# Patient Record
Sex: MALE | Race: WHITE | NOT HISPANIC OR LATINO | Employment: UNEMPLOYED | URBAN - METROPOLITAN AREA
[De-identification: names, ages, dates, MRNs, and addresses within clinical notes are randomized per-mention and may not be internally consistent; named-entity substitution may affect disease eponyms.]

---

## 2017-08-01 RX ORDER — LISINOPRIL AND HYDROCHLOROTHIAZIDE 20; 12.5 MG/1; MG/1
TABLET ORAL
Qty: 30 TAB | OUTPATIENT
Start: 2017-08-01

## 2019-03-06 ENCOUNTER — OFFICE VISIT (OUTPATIENT)
Dept: MEDICAL GROUP | Facility: MEDICAL CENTER | Age: 49
End: 2019-03-06
Payer: COMMERCIAL

## 2019-03-06 ENCOUNTER — HOSPITAL ENCOUNTER (OUTPATIENT)
Dept: LAB | Facility: MEDICAL CENTER | Age: 49
End: 2019-03-06
Attending: PHYSICIAN ASSISTANT
Payer: COMMERCIAL

## 2019-03-06 VITALS
HEART RATE: 76 BPM | BODY MASS INDEX: 34.16 KG/M2 | SYSTOLIC BLOOD PRESSURE: 122 MMHG | DIASTOLIC BLOOD PRESSURE: 62 MMHG | WEIGHT: 230.6 LBS | HEIGHT: 69 IN | OXYGEN SATURATION: 96 % | TEMPERATURE: 97.6 F

## 2019-03-06 DIAGNOSIS — Z23 NEED FOR VACCINATION: ICD-10-CM

## 2019-03-06 DIAGNOSIS — I63.40 CEREBROVASCULAR ACCIDENT (CVA) DUE TO EMBOLISM OF CEREBRAL ARTERY (HCC): ICD-10-CM

## 2019-03-06 DIAGNOSIS — E78.5 DYSLIPIDEMIA: ICD-10-CM

## 2019-03-06 DIAGNOSIS — M54.42 CHRONIC BILATERAL LOW BACK PAIN WITH BILATERAL SCIATICA: ICD-10-CM

## 2019-03-06 DIAGNOSIS — F17.200 TOBACCO DEPENDENCE: ICD-10-CM

## 2019-03-06 DIAGNOSIS — J42 CHRONIC BRONCHITIS, UNSPECIFIED CHRONIC BRONCHITIS TYPE (HCC): ICD-10-CM

## 2019-03-06 DIAGNOSIS — G89.29 CHRONIC BILATERAL LOW BACK PAIN WITH BILATERAL SCIATICA: ICD-10-CM

## 2019-03-06 DIAGNOSIS — R73.03 PREDIABETES: ICD-10-CM

## 2019-03-06 DIAGNOSIS — J30.2 SEASONAL ALLERGIES: ICD-10-CM

## 2019-03-06 DIAGNOSIS — F33.9 EPISODE OF RECURRENT MAJOR DEPRESSIVE DISORDER, UNSPECIFIED DEPRESSION EPISODE SEVERITY (HCC): ICD-10-CM

## 2019-03-06 DIAGNOSIS — M54.41 CHRONIC BILATERAL LOW BACK PAIN WITH BILATERAL SCIATICA: ICD-10-CM

## 2019-03-06 DIAGNOSIS — I10 ESSENTIAL HYPERTENSION: ICD-10-CM

## 2019-03-06 DIAGNOSIS — G47.33 OBSTRUCTIVE SLEEP APNEA SYNDROME: ICD-10-CM

## 2019-03-06 DIAGNOSIS — F41.9 ANXIETY: ICD-10-CM

## 2019-03-06 PROBLEM — J44.9 COPD (CHRONIC OBSTRUCTIVE PULMONARY DISEASE) (HCC): Status: ACTIVE | Noted: 2019-03-06

## 2019-03-06 LAB
ALBUMIN SERPL BCP-MCNC: 4.6 G/DL (ref 3.2–4.9)
ALBUMIN/GLOB SERPL: 1.6 G/DL
ALP SERPL-CCNC: 52 U/L (ref 30–99)
ALT SERPL-CCNC: 101 U/L (ref 2–50)
ANION GAP SERPL CALC-SCNC: 7 MMOL/L (ref 0–11.9)
AST SERPL-CCNC: 69 U/L (ref 12–45)
BASOPHILS # BLD AUTO: 0.6 % (ref 0–1.8)
BASOPHILS # BLD: 0.03 K/UL (ref 0–0.12)
BILIRUB SERPL-MCNC: 0.7 MG/DL (ref 0.1–1.5)
BUN SERPL-MCNC: 12 MG/DL (ref 8–22)
CALCIUM SERPL-MCNC: 10.4 MG/DL (ref 8.5–10.5)
CHLORIDE SERPL-SCNC: 101 MMOL/L (ref 96–112)
CHOLEST SERPL-MCNC: 195 MG/DL (ref 100–199)
CO2 SERPL-SCNC: 29 MMOL/L (ref 20–33)
CREAT SERPL-MCNC: 0.97 MG/DL (ref 0.5–1.4)
EOSINOPHIL # BLD AUTO: 0.07 K/UL (ref 0–0.51)
EOSINOPHIL NFR BLD: 1.3 % (ref 0–6.9)
ERYTHROCYTE [DISTWIDTH] IN BLOOD BY AUTOMATED COUNT: 51 FL (ref 35.9–50)
GLOBULIN SER CALC-MCNC: 2.9 G/DL (ref 1.9–3.5)
GLUCOSE SERPL-MCNC: 115 MG/DL (ref 65–99)
HCT VFR BLD AUTO: 42.1 % (ref 42–52)
HDLC SERPL-MCNC: 28 MG/DL
HGB BLD-MCNC: 12.7 G/DL (ref 14–18)
IMM GRANULOCYTES # BLD AUTO: 0.03 K/UL (ref 0–0.11)
IMM GRANULOCYTES NFR BLD AUTO: 0.6 % (ref 0–0.9)
LDLC SERPL CALC-MCNC: 145 MG/DL
LYMPHOCYTES # BLD AUTO: 1.19 K/UL (ref 1–4.8)
LYMPHOCYTES NFR BLD: 22.4 % (ref 22–41)
MCH RBC QN AUTO: 23.5 PG (ref 27–33)
MCHC RBC AUTO-ENTMCNC: 30.2 G/DL (ref 33.7–35.3)
MCV RBC AUTO: 77.8 FL (ref 81.4–97.8)
MONOCYTES # BLD AUTO: 0.51 K/UL (ref 0–0.85)
MONOCYTES NFR BLD AUTO: 9.6 % (ref 0–13.4)
NEUTROPHILS # BLD AUTO: 3.48 K/UL (ref 1.82–7.42)
NEUTROPHILS NFR BLD: 65.5 % (ref 44–72)
NRBC # BLD AUTO: 0 K/UL
NRBC BLD-RTO: 0 /100 WBC
PLATELET # BLD AUTO: 249 K/UL (ref 164–446)
PMV BLD AUTO: 10.7 FL (ref 9–12.9)
POTASSIUM SERPL-SCNC: 4.1 MMOL/L (ref 3.6–5.5)
PROT SERPL-MCNC: 7.5 G/DL (ref 6–8.2)
RBC # BLD AUTO: 5.41 M/UL (ref 4.7–6.1)
SODIUM SERPL-SCNC: 137 MMOL/L (ref 135–145)
TRIGL SERPL-MCNC: 112 MG/DL (ref 0–149)
WBC # BLD AUTO: 5.3 K/UL (ref 4.8–10.8)

## 2019-03-06 PROCEDURE — 85025 COMPLETE CBC W/AUTO DIFF WBC: CPT

## 2019-03-06 PROCEDURE — 90471 IMMUNIZATION ADMIN: CPT | Performed by: INTERNAL MEDICINE

## 2019-03-06 PROCEDURE — 90472 IMMUNIZATION ADMIN EACH ADD: CPT | Performed by: INTERNAL MEDICINE

## 2019-03-06 PROCEDURE — 90715 TDAP VACCINE 7 YRS/> IM: CPT | Performed by: INTERNAL MEDICINE

## 2019-03-06 PROCEDURE — 90686 IIV4 VACC NO PRSV 0.5 ML IM: CPT | Performed by: INTERNAL MEDICINE

## 2019-03-06 PROCEDURE — 36415 COLL VENOUS BLD VENIPUNCTURE: CPT

## 2019-03-06 PROCEDURE — 80061 LIPID PANEL: CPT

## 2019-03-06 PROCEDURE — 99204 OFFICE O/P NEW MOD 45 MIN: CPT | Mod: 25 | Performed by: INTERNAL MEDICINE

## 2019-03-06 PROCEDURE — 80053 COMPREHEN METABOLIC PANEL: CPT

## 2019-03-06 RX ORDER — NICOTINE 21 MG/24HR
1 PATCH, TRANSDERMAL 24 HOURS TRANSDERMAL EVERY 24 HOURS
Qty: 30 PATCH | Refills: 3 | Status: SHIPPED | OUTPATIENT
Start: 2019-03-06 | End: 2019-09-04

## 2019-03-06 RX ORDER — FENOFIBRATE 145 MG/1
145 TABLET, COATED ORAL EVERY EVENING
Refills: 5 | COMMUNITY
Start: 2019-02-22

## 2019-03-06 RX ORDER — CLONAZEPAM 0.5 MG/1
TABLET ORAL
Refills: 1 | COMMUNITY
Start: 2019-01-28 | End: 2019-04-29 | Stop reason: SDUPTHER

## 2019-03-06 RX ORDER — ATORVASTATIN CALCIUM 80 MG/1
80 TABLET, FILM COATED ORAL EVERY EVENING
Refills: 11 | COMMUNITY
Start: 2019-01-30

## 2019-03-06 RX ORDER — FLUOXETINE HYDROCHLORIDE 60 MG/1
1 TABLET, FILM COATED ORAL; ORAL
Refills: 0 | COMMUNITY
Start: 2018-12-22 | End: 2019-03-06

## 2019-03-06 RX ORDER — PRAZOSIN HYDROCHLORIDE 2 MG/1
2 CAPSULE ORAL DAILY
Refills: 1 | COMMUNITY
Start: 2019-02-10 | End: 2019-04-29 | Stop reason: SDUPTHER

## 2019-03-06 RX ORDER — BUSPIRONE HYDROCHLORIDE 30 MG/1
TABLET ORAL
Refills: 1 | COMMUNITY
Start: 2019-02-01 | End: 2019-04-19 | Stop reason: SDUPTHER

## 2019-03-06 RX ORDER — FLUOXETINE HYDROCHLORIDE 40 MG/1
80 CAPSULE ORAL
Refills: 1 | COMMUNITY
Start: 2019-02-23 | End: 2019-04-19 | Stop reason: SDUPTHER

## 2019-03-06 RX ORDER — FLUTICASONE PROPIONATE 50 MCG
1 SPRAY, SUSPENSION (ML) NASAL DAILY
Qty: 16 G | Refills: 3 | Status: SHIPPED | OUTPATIENT
Start: 2019-03-06 | End: 2019-08-31 | Stop reason: SDUPTHER

## 2019-03-06 RX ORDER — OMEPRAZOLE 20 MG/1
20 CAPSULE, DELAYED RELEASE ORAL EVERY EVENING
COMMUNITY
End: 2023-02-01

## 2019-03-06 RX ORDER — LISINOPRIL 20 MG/1
40 TABLET ORAL DAILY
Qty: 90 TAB | Refills: 3 | Status: SHIPPED | OUTPATIENT
Start: 2019-03-06 | End: 2019-04-29

## 2019-03-06 RX ORDER — CLOPIDOGREL BISULFATE 75 MG/1
75 TABLET ORAL
Refills: 1 | COMMUNITY
Start: 2018-12-22 | End: 2021-03-29

## 2019-03-06 NOTE — PROGRESS NOTES
CC:  Diagnoses of Anxiety, Chronic bilateral low back pain with bilateral sciatica, Essential hypertension, Dyslipidemia, Chronic bronchitis, unspecified chronic bronchitis type (HCC), Tobacco dependence, Prediabetes, Episode of recurrent major depressive disorder, unspecified depression episode severity (HCC), Cerebrovascular accident (CVA) due to embolism of cerebral artery (HCC), BMI 34.0-34.9,adult, Seasonal allergies, and Obstructive sleep apnea syndrome were pertinent to this visit.    HISTORY OF THE PRESENT ILLNESS: Patient is a 48 y.o. male. This pleasant patient is here today to establish care, prior patient of Dr. Jha at [facility].    Patient says his primary concerns are severe anxiety and chronic back pain.  He says that due to the combination of these conditions and history of stroke he has been unable to work and wife indicates that they have been pursuing disability but have been unfortunately declined twice.  He is meeting his psychiatrist Dr. Lozano on 4/29/19, he indicates his mood is stable enough to wait until that appointment, no SI/HI.  His anxiety medications include BuSpar, clonazepam, fluoxetine and prazosin (patient was having severe night terrors which improved on this drug).     Blood pressure has been well controlled on lisinopril.  With his history of stroke which was  8/30/2016 and most recently June 2018 he is on Plavix and atorvastatin.  He says he has a cardiologist Dr. Segovia at [facility] and his neurologist is Dr. Umanzor.  Because of his stroke within the last year he has not been able to have a back surgery.  His residual stroke symptoms include some word finding difficulty, expressive aphasia.  Patient indicates was likely embolic stroke from carotid plaque.  He is pending cardiac echocardiogram and carotid ultrasound (hx 60% blockage on R per pt) at [facility] on 4/24/19.  His cardiologist, per patient, indicated he had a familial form of hyperlipidemia and he was on Repatha  for some time but it sounds like insurance or cost became an issue.  Patient says he did labs today to follow-up with his specialist.  He has no active cardiopulmonary symptoms.  Uses albuterol rarely when he has upper respiratory infection that is symptomatic, he says likely it is due to his underlying smoking history, currently smoking 5 cigarettes/day (down 0.5 ppd) and would consider nicotine patches to quit.       Obstructive sleep apnea, has been followed by nurse practitioner Kb, and he says that sometimes he gets some sinus congestion relieved with Flonase which she would like refill.    He has chronic back pain and his symptoms have not changed since his last neuro imaging from his neurosurgery group, seen by Dr. Bass.  He says he has bilateral leg weakness, walks with a cane, has numbness in his feet.  Sometimes when he has flatulence he has some stool leakage.  Denies groin numbness.  No loss of bladder function.  Typically takes Excedrin as needed for pain.  He says he does not want opiates he has been on them in the past and did not like how he felt.  He was pending a spine surgery last year but because of his stroke had to be postponed, he wishes to follow-up with neurosurgery for surgical treatment options at this time.  Again, his back and neuro symptoms have not changed since his last evaluation from his neurosurgeon group.      Allergies: Patient has no known allergies.    Current Outpatient Prescriptions Ordered in [facility]   Medication Sig Dispense Refill   • busPIRone (BUSPAR) 30 MG tablet TAKE 1 TABLET BY MOUTH TWICE A DAY  1   • atorvastatin (LIPITOR) 80 MG tablet Take 80 mg by mouth every day.  11   • clopidogrel (PLAVIX) 75 MG Tab Take 75 mg by mouth every day.  1   • fenofibrate (TRICOR) 145 MG Tab TAKE 1 TABLET BY ORAL ROUTE EVERY DAY BEFORE BED  5   • fluoxetine (PROZAC) 40 MG capsule Take 80 mg by mouth every day. 40mg twice daily to equal 80mg  1   • omeprazole (PRILOSEC) 20 MG  delayed-release capsule Take 20 mg by mouth every day.     • lisinopril (PRINIVIL) 20 MG Tab Take 2 Tabs by mouth every day. 90 Tab 3   • fluticasone (FLONASE) 50 MCG/ACT nasal spray Spray 1 Spray in nose every day. 16 g 3   • clonazePAM (KLONOPIN) 0.5 MG Tab TAKE 1 TABLET BY MOUTH TWICE A DAY AS NEEDED FOR ANXIETY -F43.10-30D-  1   • prazosin (MINIPRESS) 2 MG Cap Take 2 mg by mouth every day.  1   • albuterol (VENTOLIN OR PROVENTIL) 108 (90 BASE) MCG/ACT AERS Inhale 2 Puffs by mouth every four hours as needed for Shortness of Breath. 1 Inhaler 0     No current Epic-ordered facility-administered medications on file.        Past Medical History:   Diagnosis Date   • Anxiety    • Arthritis    • COPD (chronic obstructive pulmonary disease) (HCC)    • Depression    • Hyperlipidemia    • Hypertension    • Stroke (HCC)        Past Surgical History:   Procedure Laterality Date   • ABDOMINAL EXPLORATION     • APPENDECTOMY         Social History   Substance Use Topics   • Smoking status: Current Every Day Smoker     Packs/day: 0.50   • Smokeless tobacco: Current User     Types: Chew      Comment: down 5/cigarretts a day or less   • Alcohol use Yes      Comment: socially usually 1 beer       Social History     Social History Narrative   • No narrative on file       Family History   Problem Relation Age of Onset   • Hypertension Mother    • Hyperlipidemia Mother    • Hypertension Father    • Lung Disease Father    • Cancer Child         non hodkins lymphoma       ROS:     - Constitutional: Negative for fever, chills     - Eyes:   Negative for blurry vision, eye pain, discharge    - ENT:  Negative for hearing changes, ear pain, ear discharge, rhinorrhea, sinus congestion, sore throat     - Respiratory: Negative for cough, sputum production, chest congestion, dyspnea, wheezing, and crackles.      - Cardiovascular: Negative for chest pain, palpitations, orthopnea, and bilateral lower extremity edema.     - Gastrointestinal:  "Negative for heartburn, nausea, vomiting, abdominal pain, hematochezia, melena, diarrhea  and greasy/foul-smelling stools. + Occasional constipation    - Genitourinary: Negative for dysuria     - Musculoskeletal: Chronic back pain    - Skin: Negative for rash, itching, cyanotic skin color change.     - Neurological: Negative for  Vertigo.  Has had intermittent bilateral hand tremor that is described as fine in quality, occurs at rest or with intention over approximately the last year that he will see his neurologist for.  No associated arm weakness.    - Endo:Negative for polyuria, heat/cold intolerance, excessive thirst    - Hem/lymphatic: Negative for swollen glands    -Allergic/immun: He gets sinus congestion occasionally relieved with Flonase, due to allergies and CPAP per patient    - Psychiatric/Behavioral: Negative for suicidal/homicidal ideation     Exam: Blood pressure 122/62, pulse 76, temperature 36.4 °C (97.6 °F), temperature source Temporal, height 1.753 m (5' 9\"), weight 104.6 kg (230 lb 9.6 oz), SpO2 96 %. Body mass index is 34.05 kg/m².    General: Normal appearing. No distress.  EARS: Normal shape and contour   NOSE, THROAT: nasal mucosa benign. oropharynx is without erythema, edema or exudates.   Neck: Supple without LAD. Thyroid is not enlarged.  Pulmonary: Clear to ausculation.  Normal effort. No rales or wheezing.  Cardiovascular: Regular rate and rhythm without significant murmur.   Abdomen: Soft, nontender, nondistended. Normal bowel sounds.  Neurologic: Cranial nerves grossly nonfocal.  No arm tremor noted today at rest or with intention.  Lymph: No cervical, supraclavicular nodes palpable  Skin: Warm and dry.  No obvious lesions.  Musculoskeletal: Normal gait. No extremity cyanosis, clubbing, or edema.  Walks with cane, forward leaning posture.  Psych: Normal mood and affect. Alert and oriented x3. Judgment and insight is normal.      Assessment/Plan  1. Anxiety  Pending appointment " "[place] 4/29/19, continue current medications for now    2. Chronic bilateral low back pain with bilateral sciatica  Patient indicates his symptoms have not changed, he has chronic pain and wishes to have surgical management  - REFERRAL TO NEUROSURGERY    3. Essential hypertension  Well-controlled continue lisinopril  - lisinopril (PRINIVIL) 20 MG Tab; Take 2 Tabs by mouth every day.  Dispense: 90 Tab; Refill: 3    4. Dyslipidemia  Continue atorvastatin.  Patient will follow up with his cardiologist Dr. Segovia regarding the Repatha and the reported history of familial hyperlipidemia.    5. Chronic bronchitis, unspecified chronic bronchitis type (HCC)  Tobacco cessation.  He rarely uses albuterol.    6. Tobacco dependence  Nicotine patches 14 mg dose ordered given he is only smoking 5 cigarettes/day.    7. Prediabetes  He said he had labs done today for his [place] doctors, we will obtain these lab results from patient to determine what his fasting glucose level is and any other related labs.    8. Episode of recurrent major depressive disorder, unspecified depression episode severity (HCC)  Continue current medication management, he has pending appointment with psychiatrist Dr. Lozano in 1 month.  Patient indicates his mood is stable to wait for this appointment.    9. Cerebrovascular accident (CVA) due to embolism of cerebral artery (HCC)  Continue Plavix, statin and follow-up with his neurologist Dr. Umanzor.  Discussed with the patient his tremor could be due to essential tremor but with his complicated history of stroke and abnormal spine, for which we do not have any primary information on such as consult notes or imaging results, I discussed with him he should have his neurologist review the symptom and he agreed.    10. BMI 34.0-34.9,adult  Patient indicates that he has a lot of \"junk \"in his diet that he will cut back on.  He does not feel he needs to go to 1 of our weight management clinic at this time.  - " Patient identified as having weight management issue.  Appropriate orders and counseling given.    11. Seasonal allergies  - fluticasone (FLONASE) 50 MCG/ACT nasal spray; Spray 1 Spray in nose every day.  Dispense: 16 g; Refill: 3    12. Obstructive sleep apnea syndrome  Continue CPAP, he is followed by nurse practitioner Kb    Health maintenance:  Tdap, Flu vaccines given today  Patient has previously had pneumococcal 23 vaccine  He turns 49 this summer would qualify for colonoscopy screening and also prostate cancer screening will be addressed at future appointment      Return to clinic 3 months        Please note that this dictation was created using voice recognition software. I have made every reasonable attempt to correct obvious errors, but I expect that there are errors of grammar and possibly content that I did not discover before finalizing the note.

## 2019-03-06 NOTE — LETTER
Replaced by [facility]  Xin Graham M.D.  [address]  [place] [address]  Fax: [phone]   Authorization for Release/Disclosure of   Protected Health Information   Name: LUCIANO MARQUEZ : 1970 SSN: [id number]   Address: [address] [place] [address] Phone:    [phone] (home)    I authorize the entity listed below to release/disclose the PHI below to:   Replaced by [facility]/Xin Graham M.D. and Xin Graham M.D.   Provider or Entity Name:     Address   City, State, Zip   Phone:      Fax:     Reason for request: continuity of care   Information to be released:    [  ] LAST COLONOSCOPY,  including any PATH REPORT and follow-up  [  ] LAST FIT/COLOGUARD RESULT [  ] LAST DEXA  [  ] LAST MAMMOGRAM  [  ] LAST PAP  [  ] LAST LABS [  ] RETINA EXAM REPORT  [  ] IMMUNIZATION RECORDS  [  ] Release all info      [  ] Check here and initial the line next to each item to release ALL health information INCLUDING  _____ Care and treatment for drug and / or alcohol abuse  _____ HIV testing, infection status, or AIDS  _____ Genetic Testing    DATES OF SERVICE OR TIME PERIOD TO BE DISCLOSED: _____________  I understand and acknowledge that:  * This Authorization may be revoked at any time by you in writing, except if your health information has already been used or disclosed.  * Your health information that will be used or disclosed as a result of you signing this authorization could be re-disclosed by the recipient. If this occurs, your re-disclosed health information may no longer be protected by State or Federal laws.  * You may refuse to sign this Authorization. Your refusal will not affect your ability to obtain treatment.  * This Authorization becomes effective upon signing and will  on (date) __________.      If no date is indicated, this Authorization will  one (1) year from the signature date.    Name: Luciano Marquez    Signature:   Date:     3/6/2019       PLEASE FAX REQUESTED RECORDS  BACK TO: [phone]

## 2019-03-13 ENCOUNTER — TELEPHONE (OUTPATIENT)
Dept: MEDICAL GROUP | Facility: MEDICAL CENTER | Age: 49
End: 2019-03-13

## 2019-03-13 ENCOUNTER — OFFICE VISIT (OUTPATIENT)
Dept: MEDICAL GROUP | Facility: MEDICAL CENTER | Age: 49
End: 2019-03-13
Payer: COMMERCIAL

## 2019-03-13 VITALS
OXYGEN SATURATION: 95 % | TEMPERATURE: 98.3 F | SYSTOLIC BLOOD PRESSURE: 128 MMHG | HEART RATE: 73 BPM | BODY MASS INDEX: 33.44 KG/M2 | WEIGHT: 225.75 LBS | HEIGHT: 69 IN | DIASTOLIC BLOOD PRESSURE: 88 MMHG

## 2019-03-13 DIAGNOSIS — R73.03 PREDIABETES: ICD-10-CM

## 2019-03-13 DIAGNOSIS — F17.200 TOBACCO DEPENDENCE: ICD-10-CM

## 2019-03-13 DIAGNOSIS — R74.8 ELEVATED LIVER ENZYMES: ICD-10-CM

## 2019-03-13 DIAGNOSIS — K21.9 GASTROESOPHAGEAL REFLUX DISEASE WITHOUT ESOPHAGITIS: ICD-10-CM

## 2019-03-13 DIAGNOSIS — D50.0 IRON DEFICIENCY ANEMIA DUE TO CHRONIC BLOOD LOSS: ICD-10-CM

## 2019-03-13 PROCEDURE — 99214 OFFICE O/P EST MOD 30 MIN: CPT | Performed by: INTERNAL MEDICINE

## 2019-03-13 NOTE — PROGRESS NOTES
CC:  Diagnoses of Iron deficiency anemia due to chronic blood loss, Gastroesophageal reflux disease without esophagitis, Prediabetes, Elevated liver enzymes, and Tobacco dependence were pertinent to this visit.    HISTORY OF THE PRESENT ILLNESS: Patient is a 48 y.o. male. This pleasant patient is here today   To follow-up abnormal labs ordered by another provider.    He was noted to be anemic with hemoglobin 12.7, low MCV of 77.8 elevated RDW of 51.  Normal WBC and platelet.  Normal creatinine.  Labs are from 3/6/19.  He says he has had hemorrhoids for at least 35 years.  No change in bowel movements, color, consistency, quality.  Has intermittent diarrhea and constipation he thinks it may be irritable bowel symptoms.  He is willing to try daily Metamucil to see if this helps.  Indicates he eats red meat, spinach and other sources of iron in his diet.  Does have chronic GERD for 30 years he said he has been on antacid over-the-counter omeprazole and over the past 15 years he has had to take this medication daily.  No unintentional weight loss, no dysphagia.  If he misses his omeprazole he does have epigastric discomfort otherwise he denies any abdominal pain.  He denies nausea, vomiting, fever, constitutional symptoms, lymphadenopathy.  His GERD trigger is diet related particularly spicy food which he does not wish to give up at this time.    Additionally we discussed his elevated liver enzymes AST 69,  with normal bilirubin and alkaline phosphatase from 3/6/19.  He says he has a history of intermittent elevation of liver enzymes.  In the past he went off Tylenol.  He now takes Excedrin and ibuprofen, 1 of the other, 2-3 times per week for chronic joint pain.  Has 1 beer socially at most once per month.  No right upper quadrant pain.  No acute risk factors for hepatitis virus known.  Denies any known new medications. He does not believe he was vaccinated for hep a/b.  BMI is elevated at 33.    Elevated  fasting glucose of 115 on labs 3/6/19.  He is aware of his prediabetes status.  He was offered nutrition evaluation for this and to cut back on cholesterol, he will let me know if he ever desires this consult.  Patient seems very knowledgeable about his diet and ways to improve health.  He is aware of his low HDL and elevated LDL, he is aware he is on the maximum dose of TriCor and atorvastatin.  Patient agrees to see if he can change his diet so we recheck his labs at interval visit we can see if there is improvement.    For history of tobacco dependency he says he discusses nicotine patches and now he has to consider a start date when he is ready.    Allergies: Patient has no known allergies.    Current Outpatient Prescriptions Ordered in [facility]   Medication Sig Dispense Refill   • busPIRone (BUSPAR) 30 MG tablet TAKE 1 TABLET BY MOUTH TWICE A DAY  1   • atorvastatin (LIPITOR) 80 MG tablet Take 80 mg by mouth every day.  11   • clonazePAM (KLONOPIN) 0.5 MG Tab TAKE 1 TABLET BY MOUTH TWICE A DAY AS NEEDED FOR ANXIETY -F43.10-30D-  1   • clopidogrel (PLAVIX) 75 MG Tab Take 75 mg by mouth every day.  1   • fenofibrate (TRICOR) 145 MG Tab TAKE 1 TABLET BY ORAL ROUTE EVERY DAY BEFORE BED  5   • fluoxetine (PROZAC) 40 MG capsule Take 80 mg by mouth every day. 40mg twice daily to equal 80mg  1   • omeprazole (PRILOSEC) 20 MG delayed-release capsule Take 20 mg by mouth every day.     • prazosin (MINIPRESS) 2 MG Cap Take 2 mg by mouth every day.  1   • lisinopril (PRINIVIL) 20 MG Tab Take 2 Tabs by mouth every day. 90 Tab 3   • fluticasone (FLONASE) 50 MCG/ACT nasal spray Spray 1 Spray in nose every day. 16 g 3   • nicotine (NICODERM) 14 MG/24HR PATCH 24 HR Apply 1 Patch to skin as directed every 24 hours. 30 Patch 3   • albuterol (VENTOLIN OR PROVENTIL) 108 (90 BASE) MCG/ACT AERS Inhale 2 Puffs by mouth every four hours as needed for Shortness of Breath. 1 Inhaler 0     No current Epic-ordered facility-administered  medications on file.        Past Medical History:   Diagnosis Date   • Anxiety    • Arthritis    • COPD (chronic obstructive pulmonary disease) (HCC)    • Depression    • Hyperlipidemia    • Hypertension    • Stroke (HCC)        Past Surgical History:   Procedure Laterality Date   • ABDOMINAL EXPLORATION     • APPENDECTOMY         Social History   Substance Use Topics   • Smoking status: Current Every Day Smoker     Packs/day: 0.50   • Smokeless tobacco: Current User     Types: Chew      Comment: down 5/cigarretts a day or less   • Alcohol use Yes      Comment: socially usually 1 beer       Social History     Social History Narrative   • No narrative on file       Family History   Problem Relation Age of Onset   • Hypertension Mother    • Hyperlipidemia Mother    • Hypertension Father    • Lung Disease Father    • Cancer Child         non hodkins lymphoma       ROS:     - Constitutional: Negative for fever, chills, unexpected weight change, night sweats    - Eyes:   Negative for blurry vision, eye pain, discharge    - ENT:  Negative for hearing changes, ear pain, ear discharge, rhinorrhea, sinus congestion, sore throat     - Respiratory: Negative for cough, sputum production, chest congestion, dyspnea, wheezing, and crackles.      - Cardiovascular: Negative for chest pain, palpitations, orthopnea, and bilateral lower extremity edema.     - Gastrointestinal: See HPI    - Genitourinary: Negative for dysuria     - Musculoskeletal: Some chronic unchanged joint pain from arthritis    - Skin: Negative for rash, itching, cyanotic skin color change.     - Neurological: No acute neurologic symptoms such as focal weakness    - Endo:Negative for polyuria, heat/cold intolerance, excessive thirst    - Hem/lymphatic: Negative for easy bruising, blood clots, lymphedema, swollen glands    -Allergic/immun: Negative for active allergy symptoms    - Psychiatric/Behavioral: Negative for depression, suicidal/homicidal ideation and  "memory loss.      Exam: Blood pressure 128/88, pulse 73, temperature 36.8 °C (98.3 °F), temperature source Temporal, height 1.753 m (5' 9\"), weight 102.4 kg (225 lb 12 oz), SpO2 95 %. Body mass index is 33.34 kg/m².    General: Normal appearing. No distress.  EYES: Conjunctiva clear lids without ptosis, pupils equal  EARS: Normal shape and contour   NOSE, THROAT: nasal mucosa benign. oropharynx is without erythema, edema or exudates.   Neck: Supple without LAD. Thyroid is not enlarged.  Pulmonary: Clear to ausculation.  Normal effort. No rales or wheezing.  Cardiovascular: Regular rate and rhythm without significant murmur.   Abdomen: Soft, nondistended. Normal bowel sounds.  Possibly some mild hepatomegaly about a centimeter below the costal's, nontender, gallbladder not palpated.  To deep palpation he did have some discomfort in right and left lower quadrants but no guarding/rigidity.  No pain to deep palpation epigastric region.  Neurologic: Cranial nerves grossly nonfocal  Lymph: No cervical, supraclavicular nodes palpable  Skin: Warm and dry.  No obvious lesions.  Musculoskeletal: Normal gait. No extremity cyanosis, clubbing, or edema.  Psych: Normal mood and affect. Alert and oriented x3. Judgment and insight is normal.         Assessment/Plan  1. Iron deficiency anemia due to chronic blood loss  Most likely iron deficient anemia due to microcytic anemia, would consider chronic blood loss from hemorrhoids but also with his long history of GERD he may benefit from upper and lower endoscopy.  Patient instructed to try daily Metamucil. Try to avoid NSAIDS as much as possible.  - IRON/TOTAL IRON BIND; Future  - FERRITIN; Future  - VITAMIN B12; Future  - TSH WITH REFLEX TO FT4; Future  - REFERRAL TO GASTROENTEROLOGY    2. Gastroesophageal reflux disease without esophagitis  Patient is aware of food triggers for GERD.  He may continue with his PPI.  We will check his B12 levels, he was instructed to use his B " complex vitamin at a different time a day than his PPI for absorption.  Long-term we will work with patient on his diet and weight as well as other behavioral factors to hopefully control heartburn without the need for long-term medication.  - REFERRAL TO GASTROENTEROLOGY    3. Prediabetes  Patient will see what he can do for his diet and exercise  - HEMOGLOBIN A1C; Future    4. Elevated liver enzymes  He does not seem to have any focal symptoms liver, exam the liver overall unremarkable except for possibly some mild hepatomegaly.  BMI of 33 would consider Silva.  Will rule out some other etiologies such as autoimmune screening testing and viral hepatitis.  - REFERRAL TO GASTROENTEROLOGY  - US-RUQ; Future  - HEP C VIRUS ANTIBODY; Future  - SPEP W/REFLEX TO ELISSA, QUINTEN, G, M; Future  - HEP B SURFACE ANTIGEN; Future  - HEP B CORE AB TOTAL; Future  - HEP B SURFACE AB; Future  - HEP A AB TOTAL; Future    5. Tobacco dependence  Patient has nicotine patches he will choose when his quit date will be.      Return to clinic 3 months or sooner if needed        Please note that this dictation was created using voice recognition software. I have made every reasonable attempt to correct obvious errors, but I expect that there are errors of grammar and possibly content that I did not discover before finalizing the note.

## 2019-03-13 NOTE — TELEPHONE ENCOUNTER
----- Message from Xin Graham M.D. sent at 3/12/2019  4:53 PM PDT -----  Regarding: FW: Test Result Question  Contact: [phone]    Kindly make patient a follow-up appointment to discuss abnormal labs.      ----- Message -----  From: Ivon Maldonado Med Ass't  Sent: 3/12/2019  12:50 PM  To: Xin Graham M.D.  Subject: FW: Test Result Question                             ----- Message -----  From: Luciano Silverman  Sent: 3/12/2019  10:50 AM  To: [facility]  Clinical Staff  Subject: Test Result Question                             Good morning doctor Santos I am contacting you in concerning my blood work showing low blood cell count. My diet includes foods that are high in iron red meat spinach poultry pork CC's on a regular basis we eat lots of green vegetables. I did have a overnight pulse ox test with my CPAP machine. Wife and I are just concerned with my low levels wondering if I might be anemic or something else going on.

## 2019-04-02 ENCOUNTER — HOSPITAL ENCOUNTER (OUTPATIENT)
Dept: RADIOLOGY | Facility: MEDICAL CENTER | Age: 49
End: 2019-04-02
Attending: INTERNAL MEDICINE
Payer: COMMERCIAL

## 2019-04-02 DIAGNOSIS — R74.8 ELEVATED LIVER ENZYMES: ICD-10-CM

## 2019-04-02 PROCEDURE — 76705 ECHO EXAM OF ABDOMEN: CPT

## 2019-04-22 RX ORDER — BUSPIRONE HYDROCHLORIDE 30 MG/1
30 TABLET ORAL 2 TIMES DAILY
Qty: 180 TAB | Refills: 0 | Status: SHIPPED | OUTPATIENT
Start: 2019-04-22 | End: 2019-04-23 | Stop reason: SDUPTHER

## 2019-04-22 RX ORDER — FLUOXETINE HYDROCHLORIDE 40 MG/1
80 CAPSULE ORAL
Qty: 90 CAP | Refills: 1 | Status: SHIPPED | OUTPATIENT
Start: 2019-04-22 | End: 2019-04-23 | Stop reason: SDUPTHER

## 2019-04-23 RX ORDER — FLUOXETINE HYDROCHLORIDE 40 MG/1
80 CAPSULE ORAL
Qty: 90 CAP | Refills: 1 | Status: SHIPPED | OUTPATIENT
Start: 2019-04-23 | End: 2019-06-12 | Stop reason: SDUPTHER

## 2019-04-23 RX ORDER — BUSPIRONE HYDROCHLORIDE 30 MG/1
30 TABLET ORAL 2 TIMES DAILY
Qty: 180 TAB | Refills: 1 | Status: SHIPPED | OUTPATIENT
Start: 2019-04-23 | End: 2019-11-06 | Stop reason: SDUPTHER

## 2019-04-24 ENCOUNTER — HOSPITAL ENCOUNTER (OUTPATIENT)
Dept: LAB | Facility: MEDICAL CENTER | Age: 49
End: 2019-04-24
Attending: INTERNAL MEDICINE
Payer: COMMERCIAL

## 2019-04-24 DIAGNOSIS — D50.0 IRON DEFICIENCY ANEMIA DUE TO CHRONIC BLOOD LOSS: ICD-10-CM

## 2019-04-24 DIAGNOSIS — R73.03 PREDIABETES: ICD-10-CM

## 2019-04-24 DIAGNOSIS — R74.8 ELEVATED LIVER ENZYMES: ICD-10-CM

## 2019-04-24 LAB
EST. AVERAGE GLUCOSE BLD GHB EST-MCNC: 128 MG/DL
HBA1C MFR BLD: 6.1 % (ref 0–5.6)
IRON SATN MFR SERPL: 5 % (ref 15–55)
IRON SERPL-MCNC: 24 UG/DL (ref 50–180)
TIBC SERPL-MCNC: 482 UG/DL (ref 250–450)

## 2019-04-24 PROCEDURE — 82607 VITAMIN B-12: CPT

## 2019-04-24 PROCEDURE — 86803 HEPATITIS C AB TEST: CPT

## 2019-04-24 PROCEDURE — 86704 HEP B CORE ANTIBODY TOTAL: CPT

## 2019-04-24 PROCEDURE — 82728 ASSAY OF FERRITIN: CPT

## 2019-04-24 PROCEDURE — 83550 IRON BINDING TEST: CPT

## 2019-04-24 PROCEDURE — 83540 ASSAY OF IRON: CPT

## 2019-04-24 PROCEDURE — 83036 HEMOGLOBIN GLYCOSYLATED A1C: CPT

## 2019-04-24 PROCEDURE — 86706 HEP B SURFACE ANTIBODY: CPT

## 2019-04-24 PROCEDURE — 84160 ASSAY OF PROTEIN ANY SOURCE: CPT

## 2019-04-24 PROCEDURE — 87340 HEPATITIS B SURFACE AG IA: CPT

## 2019-04-24 PROCEDURE — 84165 PROTEIN E-PHORESIS SERUM: CPT

## 2019-04-24 PROCEDURE — 86708 HEPATITIS A ANTIBODY: CPT

## 2019-04-24 PROCEDURE — 84443 ASSAY THYROID STIM HORMONE: CPT

## 2019-04-24 PROCEDURE — 36415 COLL VENOUS BLD VENIPUNCTURE: CPT

## 2019-04-25 LAB
FERRITIN SERPL-MCNC: 37.7 NG/ML (ref 22–322)
HBV CORE AB SERPL QL IA: NEGATIVE
HBV SURFACE AB SERPL IA-ACNC: <3.1 MIU/ML (ref 0–10)
HBV SURFACE AG SER QL: NEGATIVE
HCV AB SER QL: NEGATIVE
TSH SERPL DL<=0.005 MIU/L-ACNC: 1.39 UIU/ML (ref 0.38–5.33)
VIT B12 SERPL-MCNC: 278 PG/ML (ref 211–911)

## 2019-04-26 LAB — HAV AB SER QL IA: NEGATIVE

## 2019-04-27 LAB
ALBUMIN SERPL-MCNC: 4.58 G/DL (ref 3.75–5.01)
ALPHA1 GLOB SERPL ELPH-MCNC: 0.31 G/DL (ref 0.19–0.46)
ALPHA2 GLOB SERPL ELPH-MCNC: 0.69 G/DL (ref 0.48–1.05)
B-GLOBULIN SERPL ELPH-MCNC: 0.86 G/DL (ref 0.48–1.1)
GAMMA GLOB SERPL ELPH-MCNC: 1.06 G/DL (ref 0.62–1.51)
INTERPRETATION SERPL IFE-IMP: NORMAL
MONOCLON BAND OBS SERPL: NORMAL
PATHOLOGY STUDY: NORMAL
PROT SERPL-MCNC: 7.5 G/DL (ref 6–8.3)

## 2019-04-29 ENCOUNTER — OFFICE VISIT (OUTPATIENT)
Dept: BEHAVIORAL HEALTH | Facility: CLINIC | Age: 49
End: 2019-04-29
Payer: COMMERCIAL

## 2019-04-29 VITALS
SYSTOLIC BLOOD PRESSURE: 160 MMHG | HEIGHT: 69 IN | BODY MASS INDEX: 33.47 KG/M2 | HEART RATE: 82 BPM | DIASTOLIC BLOOD PRESSURE: 80 MMHG | RESPIRATION RATE: 16 BRPM | WEIGHT: 226 LBS

## 2019-04-29 DIAGNOSIS — F12.20 CANNABIS DEPENDENCE (HCC): ICD-10-CM

## 2019-04-29 DIAGNOSIS — F17.218 CIGARETTE NICOTINE DEPENDENCE WITH OTHER NICOTINE-INDUCED DISORDER: ICD-10-CM

## 2019-04-29 DIAGNOSIS — F40.01 PANIC DISORDER WITH AGORAPHOBIA AND MILD PANIC ATTACKS: ICD-10-CM

## 2019-04-29 DIAGNOSIS — F33.1 MODERATE EPISODE OF RECURRENT MAJOR DEPRESSIVE DISORDER (HCC): ICD-10-CM

## 2019-04-29 PROCEDURE — 99205 OFFICE O/P NEW HI 60 MIN: CPT | Performed by: NURSE PRACTITIONER

## 2019-04-29 RX ORDER — CYCLOBENZAPRINE HCL 10 MG
10 TABLET ORAL PRN
Refills: 1 | COMMUNITY
Start: 2019-04-10 | End: 2022-02-24

## 2019-04-29 RX ORDER — PRAZOSIN HYDROCHLORIDE 2 MG/1
2 CAPSULE ORAL DAILY
Qty: 90 CAP | Refills: 0 | Status: SHIPPED | OUTPATIENT
Start: 2019-04-29 | End: 2019-07-27 | Stop reason: SDUPTHER

## 2019-04-29 RX ORDER — ASPIRIN 81 MG/1
81 TABLET, CHEWABLE ORAL DAILY
COMMUNITY
End: 2021-02-23

## 2019-04-29 RX ORDER — LORATADINE 10 MG/1
10 TABLET ORAL DAILY
COMMUNITY

## 2019-04-29 RX ORDER — LISINOPRIL 20 MG/1
40 TABLET ORAL DAILY
COMMUNITY
End: 2019-09-04

## 2019-04-29 RX ORDER — CLONAZEPAM 0.5 MG/1
0.5 TABLET ORAL 2 TIMES DAILY PRN
Qty: 60 TAB | Refills: 1 | Status: SHIPPED
Start: 2019-04-29 | End: 2019-05-29

## 2019-04-29 ASSESSMENT — PATIENT HEALTH QUESTIONNAIRE - PHQ9
SUM OF ALL RESPONSES TO PHQ QUESTIONS 1-9: 10
CLINICAL INTERPRETATION OF PHQ2 SCORE: 3
5. POOR APPETITE OR OVEREATING: 1 - SEVERAL DAYS
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
## 2019-05-09 ENCOUNTER — APPOINTMENT (OUTPATIENT)
Dept: RADIOLOGY | Facility: MEDICAL CENTER | Age: 49
End: 2019-05-09
Attending: NURSE PRACTITIONER
Payer: COMMERCIAL

## 2019-05-11 ENCOUNTER — HOSPITAL ENCOUNTER (OUTPATIENT)
Dept: RADIOLOGY | Facility: MEDICAL CENTER | Age: 49
End: 2019-05-11
Attending: NURSE PRACTITIONER
Payer: COMMERCIAL

## 2019-05-11 DIAGNOSIS — M54.16 RADICULOPATHY, LUMBAR REGION: ICD-10-CM

## 2019-05-11 DIAGNOSIS — M54.16 LUMBAR RADICULOPATHY: ICD-10-CM

## 2019-05-11 PROCEDURE — 72148 MRI LUMBAR SPINE W/O DYE: CPT

## 2019-05-11 PROCEDURE — 72110 X-RAY EXAM L-2 SPINE 4/>VWS: CPT

## 2019-06-04 ENCOUNTER — OFFICE VISIT (OUTPATIENT)
Dept: BEHAVIORAL HEALTH | Facility: CLINIC | Age: 49
End: 2019-06-04
Payer: COMMERCIAL

## 2019-06-04 DIAGNOSIS — F33.1 MODERATE EPISODE OF RECURRENT MAJOR DEPRESSIVE DISORDER (HCC): ICD-10-CM

## 2019-06-04 DIAGNOSIS — F12.20 CANNABIS DEPENDENCE (HCC): ICD-10-CM

## 2019-06-04 DIAGNOSIS — F40.01 PANIC DISORDER WITH AGORAPHOBIA: ICD-10-CM

## 2019-06-04 PROCEDURE — 90791 PSYCH DIAGNOSTIC EVALUATION: CPT | Performed by: PSYCHOLOGIST

## 2019-06-04 NOTE — BH THERAPY
[facility]  INITIAL ASSESSMENT    Name: Luciano Silverman  MRN: [id number]  : 1970  Age: 48 y.o.  Date of assessment: 2019  PCP: Xin Graham M.D.  Persons in attendance: Patient  Total session time: 45 minutes      CHIEF COMPLAINT AND HISTORY OF PRESENTING PROBLEM:  (as stated by Patient):  Luciano Silverman is a 48 year old white male referred for assessment by   BENIGNO Tran (nurse practitioner) for the treatment of anxiety and depression. Patient reported that he has been diagnosed with post-traumatic stress disorder which he stated is a result of several major stresses in his life. Patient also reported that he had a T.I.A. In 2016 and 2018. The patient voluntarily presented for an individual intake to address his symptoms of depression and anxiety. Reviewed limits of confidentiality and [facility] policies; patient expressed understanding and agreed to voluntarily proceed with evaluation and treatment.        No ref. provider found.  Primary presenting issue includes   Chief Complaint   Patient presents with   • Depression   • Anxiety   • Stress   • Other     P.T.S.D. symptoms       FAMILY/SOCIAL HISTORY  Current living situation/household members: Patient is currently living with his wife of nine years.  Relevant family history/structure/dynamics: Patient's father is  and his mother lives in [place]. Patient has eight siblings. Patient has three children, and one .   Current family/social stressors: Patient stated that he has numerous stresses that he is dealing.  Quality/quantity of current family and/or social support: Patient described his wife as a very good support system.  Does patient/parent report a family history of behavioral health issues, diagnoses, or treatment? Yes  Family History   Problem Relation Age of Onset   • Hypertension Mother    • Hyperlipidemia Mother    • Hypertension Father    • Lung Disease Father    •  Cancer Child         non hodkins lymphoma   • Anxiety disorder Brother         BEHAVIORAL HEALTH TREATMENT HISTORY  Does patient/parent report a history of prior behavioral health treatment for patient? Yes:    Patient has been seen for outpatient mental health counseling for the past year from a psychologist. He also began seeing a psychiatric nurse practitioner at this clinic in May 2019.      History of untreated behavioral health issues identified? No    MEDICAL HISTORY   Past medical/surgical history:   Past Medical History:   Diagnosis Date   • Anxiety    • Arthritis    • COPD (chronic obstructive pulmonary disease) (HCC)    • Depression    • Hyperlipidemia    • Hypertension    • PTSD (post-traumatic stress disorder)    • Stroke (HCC)       Past Surgical History:   Procedure Laterality Date   • ABDOMINAL EXPLORATION     • APPENDECTOMY          Medication Allergies:  Patient has no known allergies.   Medical history provided by patient during current evaluation: Medical history is well documented in this medical record.    Patient reports last physical exam:   Does patient/parent report any history of or current developmental concerns? No  Does patient/parent report nutritional concerns? No  Does patient/parent report change in appetite or weight loss/gain? No  Does patient/parent report history of eating disorder symptoms? No  Does patient/parent report dental problem? No  Does patient/parent report physical pain? Yes   Indicate if pain is acute or chronic, and location: back and knees   Pain scale ratin - 6 on a 1 -10 scale.     Does patient/parent report functional impact of medical, developmental, or pain issues?   no    EDUCATIONAL/LEARNING HISTORY  Is patient currently enrolled in a school/educational program? No:   Highest grade level completed: High school and some college  School performance/functioning: average  History of Special Education/repeated grades/learning issues: no  Preferred  learning style: auditory/visual  Current learning needs (large print, language barrier, etc):  Auditory/visual    EMPLOYMENT/RESOURCES  Is the patient currently employed? No  Does the patient/parent report adequate financial resources? Yes  Does patient identify impact of presenting issue on work functioning? N/A  Work or income-related stressors:  N/A     HISTORY:  Does patient report current or past enlistment? Yes, two months    [If yes, complete below items]  Does patient report history of exposure to combat? No  Does patient report history of  sexual trauma? No  Does patient report other -related stressors? No    SPIRITUAL/CULTURAL/IDENTITY:  What are the patient’s/family’s spiritual beliefs or practices? Non-Anabaptist  What is the patient’s cultural or ethnic background/identity?   How does the patient identify their sexual orientation? heterosexual  How does the patient identify their gender? male  Does the patient identify any spiritual/cultural/identity factors as relevant to the presenting issue? No    LEGAL HISTORY  Has the patient ever been involved with juvenile, adult, or family legal systems? Yes   [If yes, trigger section below:]  Does patient report ever being a victim of a crime?  No  Does patient report involvement in any current legal issues?  No  Does patient report ever being arrested or committing a crime? Yes, writing bad checks  Does patient report any current agency (parole/probation/CPS/) involvement? No    ABUSE/NEGLECT/TRAUMA SCREENING  Does patient report feeling “unsafe” in his/her home, or afraid of anyone? No  Does patient report any history of physical, sexual, or emotional abuse? No  Does parent or significant other report any of the above? No  Is there evidence of neglect by self? No  Is there evidence of neglect by a caregiver? No  Does the patient/parent report any history of CPS/APS/police involvement related to suspected  abuse/neglect or domestic violence? No  Does the patient/parent report any other history of potentially traumatic life events? No  Based on the information provided during the current assessment, is a mandated report of suspected abuse/neglect being made?  No     SAFETY ASSESSMENT - SELF  Does patient acknowledge current or past symptoms of dangerousness to self? No  Does parent/significant other report patient has current or past symptoms of dangerousness to self? No      Recent change in frequency/specificity/intensity of suicidal thoughts or self-harm behavior? No  Current access to firearms, medications, or other identified means of suicide/self-harm? not indicated  If yes, willing to restrict access to means of suicide/self-harm? not indicated  Protective factors present: Fear of suicide, Future-oriented, Good impulse control, Hopefulness, Moral objection to suicide, Optimism, Positive coping skills, Strong family connections and Actively engaged in treatment    Current Suicide Risk: Low  Crisis Safety Plan completed and copy given to patient: No, but reviewed safety plan with patient.    SAFETY ASSESSMENT - OTHERS  Does paor past symptoms of aggressive behavior or risk to others? No  Does parent/significant othtient acknowledge current or past symptoms of aggressive behavior or risk to others? No  Does parent/significant other report patient has current or past symptoms of aggressive behavior or risk to others? No    Recent change in frequency/specificity/intensity of thoughts or threats to harm others? No  Current access to firearms/other identified means of harm? Not indicated  If yes, willing to restrict access to weapons/means of harm? not indicated  Protective factors present: Good frustration tolerance, Fear of consequences, Moral/spiritual prohibition, Guilt/remorse for past aggression, Well-developed sense of empathy, Positive impulse-control, Stable relationships and Actively engaged in  treatment    Current Homicide Risk:  Low  Crisis Safety Plan completed and copy given to patient? No  Based on information provided during the current assessment, is a mandated “duty to warn” being exercised? No    SUBSTANCE USE/ADDICTION HISTORY  [] Not applicable - patient 10 years of age or younger    Is there a family history of substance use/addiction? Yes  Does patient acknowledge or parent/significant other report use of/dependence on substances? Yes  Last time patient used alcohol: two days ago  Within the past week? Yes  Last time patient used marijuana: yesterday  Within the past month? Yes  Any other street drugs ever tried even once? Yes  Any use of prescription medications/pills without a prescription, or for reasons others than originally prescribed?  No  Any other addictive behavior reported (gambling, shopping, sex)? No     Drug History:  Amphetamine:  Amphetamine frequency: Past regular use  Comments: last use 21 years ago    Cannibis:  Cannabis frequency: Daily    Cocaine:  Cocaine frequency: Never used    Ecstasy:  Ecstasy frequency: Never used    Hallucinogen:  Hallucinogen frequency: Never used    Inhalant:   Inhalant frequency: Never used    Opiate:  Opiate frequency: Never used  Cannabis frequency: Daily    Other:  Other drug frequency: Never used    Sedative:   Sedative frequency: Daily  Comments: as prescribed      What consequences does the patient associate with any of the above substance use and or addictive behaviors? None    Patient’s motivation/readiness for change: N/A    [] Patient denies use of any substance/addictive behaviors    STRENGTHS/ASSETS  Strengths Identified by interviewer: Insight into problems, Evidence of good judgement, Self-awareness, Family suppport, Social support, Stable relationships and Social skills  Strengths Identified by patient: Motivated for treatment    MENTAL STATUS/OBSERVATIONS   Participation: Active verbal participation, Attentive, Engaged and Open to  feedback  Grooming: Casual and Neat  Orientation:Alert and Fully Oriented   Behavior: Calm  Eye contact: Good   Mood:Depressed and Anxious  Affect:Flexible  Thought process: Logical and Goal-directed  Thought content:  Within normal limits  Speech: Rate within normal limits and Volume within normal limits  Perception: Within normal limits  Memory: No gross evidence of memory deficits  Insight: Adequate  Judgment:  Adequate  Other:    Family/couple interaction observations: N/A      CLINICAL FORMULATION:   Luciano Silverman is a 48 year old white male referred for assessment by   BENIGNO Tran (nurse practitioner) for the treatment of anxiety and depression. Patient reported that he has been diagnosed with post-traumatic stress disorder which he stated is a result of several major stresses in his life. Patient also reported that he had a T.I.A. In August 2016 and June 2018. The patient voluntarily presented for an individual intake to address his symptoms of depression and anxiety.     Patient did not present in acute distress. Patient was appropriately groomed and cooperative. Patient was alert and oriented to person, place, and time. Eye contact was appropriate. No abnormalities in attention or concentration were noted. No abnormalities of movement present; psychomotor activity was normal. Speech was fluent and regular in rhythm, rate, volume, and tone. Thought processes were linear, logical, and goal-directed. There was no evidence of thought disorder. No auditory or visual hallucinations. Long and short term memory appeared to be intact. Insight, judgment, and impulse control were deemed to be within normal limits. Reported mood was fairly positive. Affect was appropriate and congruent with thought content and conversation. Patient denied current suicidal and homicidal ideation in plan, intent, and preparatory behavior.      DIAGNOSTIC IMPRESSION(S):  1. Moderate episode of recurrent major depressive  disorder (HCC)    2. Cannabis dependence (HCC)    3. Panic disorder with agoraphobia        IDENTIFIED NEEDS/PLAN:  [If any of these marked, trigger DISPOSITION list]  Mood/anxiety  Actively being addressed by [facility]     PLAN/DISPOSITION:    1) The patient will return to the clinic  3 weeks.  2) Crisis Response Plan:  Reviewed emergency resources with the patient and the patient expressed understanding including:  If feeling suicidal, patient will call or present to the Behavioral Health Clinic during duty hours or present to closest ED ([facility] or [facility], call 911 or crisis hotline ([phone]) after duty hours.  3) Referrals/Consults:  N/A  4) Barriers to Learning:  No  5) Readiness to Learn:  Yes  6) Cultural Concerns:  No  7) Patient voiced understanding of, and agreement with, plan and goals as annotated above.  8) Declare these services are medically necessary and appropriate to the patient’s diagnosis and needs  9) The point of contact at the [facility] Health Clinic regarding this evaluation is Dr. Tapia, Psychologist.    Does patient express agreement with the above plan? Yes     Referral appointment(s) scheduled? No       Robert Tapia, Ph.D.   Psychologist

## 2019-06-06 ENCOUNTER — OFFICE VISIT (OUTPATIENT)
Dept: MEDICAL GROUP | Facility: MEDICAL CENTER | Age: 49
End: 2019-06-06
Payer: COMMERCIAL

## 2019-06-06 VITALS
DIASTOLIC BLOOD PRESSURE: 62 MMHG | TEMPERATURE: 97.6 F | WEIGHT: 222.8 LBS | OXYGEN SATURATION: 95 % | HEART RATE: 68 BPM | HEIGHT: 69 IN | SYSTOLIC BLOOD PRESSURE: 120 MMHG | RESPIRATION RATE: 16 BRPM | BODY MASS INDEX: 33 KG/M2

## 2019-06-06 DIAGNOSIS — G47.33 OBSTRUCTIVE SLEEP APNEA SYNDROME: ICD-10-CM

## 2019-06-06 DIAGNOSIS — D50.0 IRON DEFICIENCY ANEMIA DUE TO CHRONIC BLOOD LOSS: ICD-10-CM

## 2019-06-06 DIAGNOSIS — K76.0 NONALCOHOLIC FATTY LIVER DISEASE: ICD-10-CM

## 2019-06-06 DIAGNOSIS — L85.3 DRY SKIN: ICD-10-CM

## 2019-06-06 DIAGNOSIS — R74.8 ELEVATED LIVER ENZYMES: ICD-10-CM

## 2019-06-06 DIAGNOSIS — Z23 NEED FOR VACCINATION: ICD-10-CM

## 2019-06-06 DIAGNOSIS — R73.03 PREDIABETES: ICD-10-CM

## 2019-06-06 DIAGNOSIS — F17.200 TOBACCO DEPENDENCE: ICD-10-CM

## 2019-06-06 PROCEDURE — 90746 HEPB VACCINE 3 DOSE ADULT IM: CPT | Performed by: INTERNAL MEDICINE

## 2019-06-06 PROCEDURE — 90472 IMMUNIZATION ADMIN EACH ADD: CPT | Performed by: INTERNAL MEDICINE

## 2019-06-06 PROCEDURE — 90632 HEPA VACCINE ADULT IM: CPT | Performed by: INTERNAL MEDICINE

## 2019-06-06 PROCEDURE — 90471 IMMUNIZATION ADMIN: CPT | Performed by: INTERNAL MEDICINE

## 2019-06-06 PROCEDURE — 99214 OFFICE O/P EST MOD 30 MIN: CPT | Mod: 25 | Performed by: INTERNAL MEDICINE

## 2019-06-06 RX ORDER — UREA 8.5 G/85G
CREAM TOPICAL
Qty: 1 TUBE | Refills: 3 | Status: SHIPPED | OUTPATIENT
Start: 2019-06-06 | End: 2019-06-07 | Stop reason: SDUPTHER

## 2019-06-06 NOTE — PROGRESS NOTES
CC:  Diagnoses of Prediabetes, Tobacco dependence, Need for vaccination, Obstructive sleep apnea syndrome, Iron deficiency anemia due to chronic blood loss, and Elevated liver enzymes were pertinent to this visit.    HISTORY OF THE PRESENT ILLNESS: Patient is a 48 y.o. male. This pleasant patient is here today to follow-up.  He is here with his wife.    Tobacco dependence, he says he is cutting down.  Has history of sleep apnea states compliance, has a sleep specialist he follows up with per routine.  Denies any pulmonary symptoms today.  Patient is aware that the tobacco use increases risk of cardiovascular disease.  Recent cardiology notes from Dr. Segovia 5/1/19 were reviewed, indicates his cardiac ultrasound structurally normal, mild carotid disease, no active cardiac symptoms they were planning to try to get Repatha approved.    Since patient was stable cardiac wise, we did clear him for his endoscopy, fax sent to GI.    Patient is pending endoscopy upper and lower 7/10/2019 for his GERD, iron deficient anemia and diarrhea.  His labs from 4/24/2019 were again reviewed with him today negative for hepatitis A/B/C, TSH normal, SPEP normal, A1c is 6.1, lowish in B12, low iron.  Additionally GI ordered celiac testing, CHILO and repeat TSH with T4.  His ultrasound 4/2/2019 showed signs of fatty liver disease.  He denies any new GI symptoms.  States he is taking iron, B12 supplements and GI started him on vitamin E for nonalcoholic fatty liver disease.    Chronic dry skin, lower extremities, willing to try urea.    osh records r'd: 45 pages of records reviewed.  sleep apnea on CPAP.  The stroke history was two TIAs, most recent 6/18.  Cardiology note 1/30/19 is recommending aspirin and Plavix and they have pending orders for MPI for history of atypical chest pain, carotid ultrasound and echocardiogram.  30-day Holter monitor was unremarkable.  Prior carotid ultrasound has shown 50-69% stenosis R ICA. card note 5/1/19   byan approve repatha, sob is pulmonary    Allergies: Patient has no known allergies.    Current Outpatient Prescriptions Ordered in [facility]   Medication Sig Dispense Refill   • cyclobenzaprine (FLEXERIL) 10 MG Tab Take 10 mg by mouth as needed.  1   • lisinopril (PRINIVIL) 20 MG Tab Take 40 mg by mouth every day.     • loratadine (CLARITIN) 10 MG Tab Take 10 mg by mouth every day.     • aspirin (ASA) 81 MG Chew Tab chewable tablet Take 81 mg by mouth every day.     • prazosin (MINIPRESS) 2 MG Cap Take 1 Cap by mouth every day. 90 Cap 0   • cyanocobalamin (VITAMIN B12) 1000 MCG Tab Take 1 Tab by mouth every day. 30 Tab 11   • ferrous sulfate 325 (65 Fe) MG tablet Take 1 Tab by mouth 2 Times a Day. 60 Tab 6   • busPIRone (BUSPAR) 30 MG tablet Take 1 Tab by mouth 2 times a day. 180 Tab 1   • fluoxetine (PROZAC) 40 MG capsule Take 2 Caps by mouth every day. 40mg twice daily to equal 80mg 90 Cap 1   • atorvastatin (LIPITOR) 80 MG tablet Take 80 mg by mouth every day.  11   • clopidogrel (PLAVIX) 75 MG Tab Take 75 mg by mouth every day.  1   • fenofibrate (TRICOR) 145 MG Tab TAKE 1 TABLET BY ORAL ROUTE EVERY DAY BEFORE BED  5   • omeprazole (PRILOSEC) 20 MG delayed-release capsule Take 20 mg by mouth every day.     • fluticasone (FLONASE) 50 MCG/ACT nasal spray Spray 1 Spray in nose every day. 16 g 3   • nicotine (NICODERM) 14 MG/24HR PATCH 24 HR Apply 1 Patch to skin as directed every 24 hours. 30 Patch 3   • albuterol (VENTOLIN OR PROVENTIL) 108 (90 BASE) MCG/ACT AERS Inhale 2 Puffs by mouth every four hours as needed for Shortness of Breath. 1 Inhaler 0     No current Epic-ordered facility-administered medications on file.        Past Medical History:   Diagnosis Date   • Anxiety    • Arthritis    • COPD (chronic obstructive pulmonary disease) (HCC)    • Depression    • Hyperlipidemia    • Hypertension    • PTSD (post-traumatic stress disorder)    • Stroke (HCC)        Past Surgical History:   Procedure Laterality Date  "  • ABDOMINAL EXPLORATION     • APPENDECTOMY         Social History   Substance Use Topics   • Smoking status: Current Every Day Smoker     Packs/day: 0.50     Years: 38.00   • Smokeless tobacco: Current User     Types: Chew      Comment: down 5/cigarrettes a day or less   • Alcohol use Yes      Comment: socially - usually 1 beer twice a year       Social History     Social History Narrative   • No narrative on file       Family History   Problem Relation Age of Onset   • Hypertension Mother    • Hyperlipidemia Mother    • Hypertension Father    • Lung Disease Father    • Cancer Child         non hodkins lymphoma   • Anxiety disorder Brother        ROS:     - Constitutional: Negative for fever, chills, unexpected weight change, night sweats    - Eyes:   Negative for blurry vision, eye pain, discharge    - ENT:  Negative for hearing changes, ear pain, ear discharge, rhinorrhea, sinus congestion, sore throat     - Respiratory: Negative for cough, sputum production, chest congestion, dyspnea, wheezing, and crackles.      - Cardiovascular: Negative for chest pain, palpitations, orthopnea, and bilateral lower extremity edema.     - Gastrointestinal: See HPI  - Genitourinary: Negative for dysuria    - Musculoskeletal: No new joint issues  - Skin: See HPI    - Neurological: Negative for any strokelike symptoms  - Endo:Negative for polyuria, heat/cold intolerance, excessive thirst    - Hem/lymphatic: Negative for easy bruising, blood clots, lymphedema, swollen glands    -Allergic/immun: Negative for allergic rhinitis    - Psychiatric/Behavioral: Negative for depression, suicidal/homicidal ideation and memory loss.      Exam: /62 (BP Location: Left arm, Patient Position: Sitting, BP Cuff Size: Adult)   Pulse 68   Temp 36.4 °C (97.6 °F) (Temporal)   Resp 16   Ht 1.753 m (5' 9\")   Wt 101.1 kg (222 lb 12.8 oz)   SpO2 95%  Body mass index is 32.9 kg/m².    General: Normal appearing. No distress.  EYES: Conjunctiva " clear lids without ptosis, pupils equal  EARS: Normal shape and contour   Pulmonary: Clear to ausculation.  Normal effort. No rales or wheezing.  Cardiovascular: Regular rate and rhythm without significant murmur.   Abdomen: Soft, nontender, nondistended. Normal bowel sounds.  Neurologic: Cranial nerves grossly nonfocal  Skin: Warm and dry.  Dry patches of skin over bilateral knees, patchy lower extremities and particularly on feet, symmetric.  Musculoskeletal: Walks with cane.  No extremity cyanosis, clubbing, or edema.  Psych: Normal mood and affect. Alert and oriented x3. Judgment and insight is normal.      Assessment/Plan  1. Prediabetes  Diet and exercise encouraged especially with the fatty liver disease.  Patient to work on weight loss.    2. Tobacco dependence  Patient to continue to wean down, he is aware of cardiovascular risk w/continued nicotine use per    3. Need for vaccination  Hepatitis B indicated, especially for the prediabetes, he prefers a combination vaccine if available.  - T[place] HepA/HepB IM    4. Obstructive sleep apnea syndrome  Follow-up sleep    5. Iron deficiency anemia due to chronic blood loss  f/u GI, on iron, pending endoscopy 7/10/2019    6. Elevated liver enzymes  Weight loss encouraged, additionally GI ordered some further labs.    7.  Dry skin  -Urea 10% cream    Return to clinic 3 months or sooner if needed        Please note that this dictation was created using voice recognition software. I have made every reasonable attempt to correct obvious errors, but I expect that there are errors of grammar and possibly content that I did not discover before finalizing the note.

## 2019-06-07 ENCOUNTER — PATIENT MESSAGE (OUTPATIENT)
Dept: MEDICAL GROUP | Facility: MEDICAL CENTER | Age: 49
End: 2019-06-07

## 2019-06-07 DIAGNOSIS — L85.3 DRY SKIN: ICD-10-CM

## 2019-06-10 RX ORDER — UREA 8.5 G/85G
CREAM TOPICAL
Qty: 2 TUBE | Refills: 3 | Status: SHIPPED | OUTPATIENT
Start: 2019-06-10 | End: 2023-02-01
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
## 2019-06-12 ENCOUNTER — OFFICE VISIT (OUTPATIENT)
Dept: BEHAVIORAL HEALTH | Facility: CLINIC | Age: 49
End: 2019-06-12
Payer: COMMERCIAL

## 2019-06-12 VITALS
HEART RATE: 73 BPM | WEIGHT: 224 LBS | RESPIRATION RATE: 16 BRPM | DIASTOLIC BLOOD PRESSURE: 94 MMHG | BODY MASS INDEX: 33.18 KG/M2 | SYSTOLIC BLOOD PRESSURE: 144 MMHG | HEIGHT: 69 IN

## 2019-06-12 DIAGNOSIS — F40.01 PANIC DISORDER WITH AGORAPHOBIA AND MILD PANIC ATTACKS: ICD-10-CM

## 2019-06-12 DIAGNOSIS — F12.20 CANNABIS DEPENDENCE (HCC): ICD-10-CM

## 2019-06-12 DIAGNOSIS — F33.1 MODERATE EPISODE OF RECURRENT MAJOR DEPRESSIVE DISORDER (HCC): ICD-10-CM

## 2019-06-12 PROCEDURE — 99214 OFFICE O/P EST MOD 30 MIN: CPT | Performed by: NURSE PRACTITIONER

## 2019-06-12 RX ORDER — CLONAZEPAM 1 MG/1
0.5 TABLET ORAL 2 TIMES DAILY
Qty: 60 TAB | Refills: 0 | Status: SHIPPED
Start: 2019-06-12 | End: 2019-07-12

## 2019-06-12 RX ORDER — FLUOXETINE HYDROCHLORIDE 40 MG/1
80 CAPSULE ORAL
Qty: 90 CAP | Refills: 1 | Status: SHIPPED | OUTPATIENT
Start: 2019-06-12 | End: 2019-09-04 | Stop reason: SDUPTHER

## 2019-06-21 ENCOUNTER — OFFICE VISIT (OUTPATIENT)
Dept: BEHAVIORAL HEALTH | Facility: CLINIC | Age: 49
End: 2019-06-21
Payer: COMMERCIAL

## 2019-06-21 DIAGNOSIS — F12.20 CANNABIS DEPENDENCE (HCC): ICD-10-CM

## 2019-06-21 DIAGNOSIS — F40.01 PANIC DISORDER WITH AGORAPHOBIA AND MILD PANIC ATTACKS: ICD-10-CM

## 2019-06-21 DIAGNOSIS — F33.1 MODERATE EPISODE OF RECURRENT MAJOR DEPRESSIVE DISORDER (HCC): ICD-10-CM

## 2019-06-21 PROCEDURE — 90834 PSYTX W PT 45 MINUTES: CPT | Performed by: PSYCHOLOGIST

## 2019-06-21 NOTE — BH THERAPY
[facility]  Therapy Progress Note    Patient Name: Luciano [place]  Patient MRN: [id number]  Today's Date: 6/21/2019     Type of session:Individual psychotherapy  Length of session: 45 minutes  Persons in attendance:Patient    Subjective/New Info:   Luciano [place] is a 48 year old white male referred for assessment by   BENIGNO Tran (nurse practitioner) for the treatment of anxiety and depression. Patient reported that he has been diagnosed with post-traumatic stress disorder which he stated is a result of several major stresses in his life. Patient also reported that he had a T.I.A. In August 2016 and June 2018. The patient voluntarily presented for an individual intake to address his symptoms of depression and anxiety. Patient appears to be doing fairly well. Talked with patient about the importance of developing a hobby or leisure activity that he might enjoy. Also talked with patient about the importance of have an identity and purpose. Patient did have a nice couple days camping with his family and has been spending quite a bit of time with his dog.     Patient did not present in acute distress. Patient was appropriately groomed and cooperative. Patient was alert and oriented to person, place, and time. Eye contact was appropriate. No abnormalities in attention or concentration were noted. No abnormalities of movement present; psychomotor activity was normal. Speech was fluent and regular in rhythm, rate, volume, and tone. Thought processes were linear, logical, and goal-directed. There was no evidence of thought disorder. No auditory or visual hallucinations. Long and short term memory appeared to be intact. Insight, judgment, and impulse control were deemed to be within normal limits. Reported mood was fairly positive. Affect was appropriate and congruent with thought content and conversation. Patient denied current suicidal and homicidal ideation in plan, intent, and preparatory  behavior.       Objective/Observations:   Participation: Active verbal participation, Attentive, Engaged and Open to feedback   Grooming: Casual and Neat   Cognition: Alert and Fully Oriented   Eye contact: Good   Mood: Depressed and Anxious   Affect: Flexible   Thought process: Logical and Goal-directed   Speech: Rate within normal limits and Volume within normal limits   Other:     Diagnoses:   1. Moderate episode of recurrent major depressive disorder (HCC)    2. Panic disorder with agoraphobia and mild panic attacks    3. Cannabis dependence (HCC)         Current risk:   SUICIDE: Low   Homicide: Low   Self-harm: Low   Relapse: Not applicable   Other:    Safety Plan reviewed? Not Indicated   If evidence of imminent risk is present, intervention/plan:     Therapeutic Intervention(s): Cognitive modification, Conflict clarification, Develop/modify treatment plan, Distress tolerance skills, Leisure and recreation skills and Socialization    Treatment Goal(s)/Objective(s) addressed:   Reduce symptoms of Depression:  Objective A: Patient will increase activity level by will participating in at least two hours, three times per week in a social or leisure activity with family member or close friend.  Objective B: Patient will express an increase in positive statements about self by will making at least five positive statements per day about self-circumstances.  Objective C: Patient will spend more time with friend in social situations by spending at least thirty minutes four times per week in a social situation, interacting appropriately with a friend.    Reduce Symptoms of Anxiety:  Objective A: Patient will learn one effective technique for dealing with anxiety each week, and utilize it effectively when feeling anxious.  Objective B: Patient will express an increase in positive statements by making at least five positive statements per day about self or current circumstances.  Objective C: Patient will increase  activity level by participating in at least two hours, three times per week in a leisure or social activity.      Progress toward Treatment Goals: No change    Plan:  - Continue Individual therapy    Robert Tapia, Ph.D.  6/21/2019

## 2019-07-23 ENCOUNTER — HOSPITAL ENCOUNTER (OUTPATIENT)
Dept: LAB | Facility: MEDICAL CENTER | Age: 49
End: 2019-07-23
Attending: INTERNAL MEDICINE
Payer: COMMERCIAL

## 2019-07-23 LAB
ALBUMIN SERPL BCP-MCNC: 4.1 G/DL (ref 3.2–4.9)
ALBUMIN/GLOB SERPL: 1.4 G/DL
ALP SERPL-CCNC: 45 U/L (ref 30–99)
ALT SERPL-CCNC: 65 U/L (ref 2–50)
ANION GAP SERPL CALC-SCNC: 6 MMOL/L (ref 0–11.9)
AST SERPL-CCNC: 31 U/L (ref 12–45)
BILIRUB SERPL-MCNC: 0.3 MG/DL (ref 0.1–1.5)
BUN SERPL-MCNC: 14 MG/DL (ref 8–22)
CALCIUM SERPL-MCNC: 9.3 MG/DL (ref 8.5–10.5)
CHLORIDE SERPL-SCNC: 102 MMOL/L (ref 96–112)
CHOLEST SERPL-MCNC: 204 MG/DL (ref 100–199)
CO2 SERPL-SCNC: 27 MMOL/L (ref 20–33)
CREAT SERPL-MCNC: 1.08 MG/DL (ref 0.5–1.4)
FASTING STATUS PATIENT QL REPORTED: NORMAL
GLOBULIN SER CALC-MCNC: 3 G/DL (ref 1.9–3.5)
GLUCOSE SERPL-MCNC: 109 MG/DL (ref 65–99)
HDLC SERPL-MCNC: 28 MG/DL
LDLC SERPL CALC-MCNC: 156 MG/DL
POTASSIUM SERPL-SCNC: 4.1 MMOL/L (ref 3.6–5.5)
PROT SERPL-MCNC: 7.1 G/DL (ref 6–8.2)
SODIUM SERPL-SCNC: 135 MMOL/L (ref 135–145)
T4 FREE SERPL-MCNC: 0.86 NG/DL (ref 0.53–1.43)
TRIGL SERPL-MCNC: 102 MG/DL (ref 0–149)
TSH SERPL DL<=0.005 MIU/L-ACNC: 1.65 UIU/ML (ref 0.38–5.33)

## 2019-07-23 PROCEDURE — 86038 ANTINUCLEAR ANTIBODIES: CPT

## 2019-07-23 PROCEDURE — 84443 ASSAY THYROID STIM HORMONE: CPT

## 2019-07-23 PROCEDURE — 83516 IMMUNOASSAY NONANTIBODY: CPT

## 2019-07-23 PROCEDURE — 82784 ASSAY IGA/IGD/IGG/IGM EACH: CPT

## 2019-07-23 PROCEDURE — 80053 COMPREHEN METABOLIC PANEL: CPT

## 2019-07-23 PROCEDURE — 36415 COLL VENOUS BLD VENIPUNCTURE: CPT

## 2019-07-23 PROCEDURE — 80061 LIPID PANEL: CPT

## 2019-07-23 PROCEDURE — 84439 ASSAY OF FREE THYROXINE: CPT

## 2019-07-24 LAB
IGA SERPL-MCNC: 244 MG/DL (ref 68–408)
NUCLEAR IGG SER QL IA: NORMAL
TTG IGA SER IA-ACNC: 0 U/ML (ref 0–3)

## 2019-07-29 RX ORDER — PRAZOSIN HYDROCHLORIDE 2 MG/1
CAPSULE ORAL
Qty: 30 CAP | Refills: 0 | Status: SHIPPED | OUTPATIENT
Start: 2019-07-29 | End: 2019-08-30 | Stop reason: SDUPTHER

## 2019-07-31 ENCOUNTER — APPOINTMENT (OUTPATIENT)
Dept: BEHAVIORAL HEALTH | Facility: CLINIC | Age: 49
End: 2019-07-31
Payer: MEDICAID

## 2019-08-21 ENCOUNTER — OFFICE VISIT (OUTPATIENT)
Dept: BEHAVIORAL HEALTH | Facility: CLINIC | Age: 49
End: 2019-08-21
Payer: COMMERCIAL

## 2019-08-21 DIAGNOSIS — F33.1 MODERATE EPISODE OF RECURRENT MAJOR DEPRESSIVE DISORDER (HCC): ICD-10-CM

## 2019-08-21 DIAGNOSIS — F40.01 PANIC DISORDER WITH AGORAPHOBIA AND MILD PANIC ATTACKS: ICD-10-CM

## 2019-08-21 PROCEDURE — 90834 PSYTX W PT 45 MINUTES: CPT | Performed by: PSYCHOLOGIST

## 2019-08-21 NOTE — BH THERAPY
[facility]  Therapy Progress Note    Patient Name: Luciano [place]  Patient MRN: [id number]  Today's Date: 8/21/2019     Type of session: Individual Therapy  Length of session: 45 minutes  Persons in attendance:Patient    Subjective/New Info:   Luciano [place] is a 48 year old white male referred for assessment by   BENIGNO Tran (nurse practitioner) for the treatment of anxiety and depression. Patient reported that he has been diagnosed with post-traumatic stress disorder which he stated is a result of several major stresses in his life. Patient also reported that he had a T.I.A. In August 2016 and June 2018. Patient continues to be quite frustrated with his inability to work due to his medical problems. He feels guilty because his wife has to provide all of the financial support. Talked with patient about the importance of identifying a hobby or leisure activity that he may enjoy. He spends almost all of his time at home by himself as his wife is working six or seven days a week. Patient does enjoy time with his animals and suggested that he might consider dog sitting.     Patient did not present in acute distress. Patient was appropriately groomed and cooperative. Patient was alert and oriented to person, place, and time. Eye contact was appropriate. No abnormalities in attention or concentration were noted. No abnormalities of movement present; psychomotor activity was normal. Speech was fluent and regular in rhythm, rate, volume, and tone. Thought processes were linear, logical, and goal-directed. There was no evidence of thought disorder. No auditory or visual hallucinations. Long and short term memory appeared to be intact. Insight, judgment, and impulse control were deemed to be within normal limits. Reported mood was fairly negative. Affect was appropriate and congruent with thought content and conversation. Patient denied current suicidal and homicidal ideation in plan, intent, and  preparatory behavior.        Objective/Observations:              Participation: Active verbal participation, Attentive, Engaged and Open to feedback              Grooming: Casual and Neat              Cognition: Alert and Fully Oriented              Eye contact: Good              Mood: Depressed and Anxious              Affect: Flexible              Thought process: Logical and Goal-directed              Speech: Rate within normal limits and Volume within normal limits              Other:    Other:     Diagnoses:   1. Moderate episode of recurrent major depressive disorder (HCC)    2. Panic disorder with agoraphobia and mild panic attacks         Current risk:   SUICIDE: Low   Homicide: Low   Self-harm: Low   Relapse: Not applicable   Other:    Safety Plan reviewed? Not Indicated   If evidence of imminent risk is present, intervention/plan:     Therapeutic Intervention(s): Cognitive modification, Conflict resolution skills, Distress tolerance skills, Leisure and recreation skills and Supportive psychotherapy    Treatment Goal(s)/Objective(s) addressed:   Reduce symptoms of Depression:  Objective A: Patient will increase activity level by will participating in at least two hours, three times per week in a social or leisure activity with family member or close friend.  Objective B: Patient will express an increase in positive statements about self by will making at least five positive statements per day about self-circumstances.  Objective C: Patient will spend more time with friend in social situations by spending at least thirty minutes four times per week in a social situation, interacting appropriately with a friend.     Reduce Symptoms of Anxiety:  Objective A: Patient will learn one effective technique for dealing with anxiety each week, and utilize it effectively when feeling anxious.  Objective B: Patient will express an increase in positive statements by making at least five positive statements per day about  self or current circumstances.  Objective C: Patient will increase activity level by participating in at least two hours, three times per week in a leisure or social activity.      Progress toward Treatment Goals: Mild improvement    Plan:  - Continue Individual therapy    Robert Tapia, Ph.D.  8/21/2019

## 2019-08-30 RX ORDER — PRAZOSIN HYDROCHLORIDE 2 MG/1
2 CAPSULE ORAL
Qty: 30 CAP | Refills: 0 | Status: SHIPPED | OUTPATIENT
Start: 2019-08-30 | End: 2019-09-04 | Stop reason: SDUPTHER

## 2019-08-31 DIAGNOSIS — J30.2 SEASONAL ALLERGIES: ICD-10-CM

## 2019-09-03 RX ORDER — FLUTICASONE PROPIONATE 50 MCG
SPRAY, SUSPENSION (ML) NASAL
Qty: 1 BOTTLE | Refills: 6 | Status: SHIPPED | OUTPATIENT
Start: 2019-09-03 | End: 2020-07-20

## 2019-09-03 NOTE — TELEPHONE ENCOUNTER
Was the patient seen in the last year in this department? Yes LOV: 06/06/2019     Does patient have an active prescription for medications requested? Yes    Received Request Via: Pharmacy     FLUTICASONE PROP 50 MCG SPRAY

## 2019-09-04 ENCOUNTER — OFFICE VISIT (OUTPATIENT)
Dept: BEHAVIORAL HEALTH | Facility: CLINIC | Age: 49
End: 2019-09-04
Payer: COMMERCIAL

## 2019-09-04 ENCOUNTER — OFFICE VISIT (OUTPATIENT)
Dept: MEDICAL GROUP | Facility: MEDICAL CENTER | Age: 49
End: 2019-09-04
Payer: COMMERCIAL

## 2019-09-04 VITALS
SYSTOLIC BLOOD PRESSURE: 132 MMHG | RESPIRATION RATE: 16 BRPM | OXYGEN SATURATION: 96 % | HEART RATE: 69 BPM | WEIGHT: 213 LBS | DIASTOLIC BLOOD PRESSURE: 74 MMHG | TEMPERATURE: 98.9 F | BODY MASS INDEX: 31.55 KG/M2 | HEIGHT: 69 IN

## 2019-09-04 VITALS
BODY MASS INDEX: 33.18 KG/M2 | HEART RATE: 68 BPM | DIASTOLIC BLOOD PRESSURE: 79 MMHG | HEIGHT: 69 IN | WEIGHT: 224 LBS | SYSTOLIC BLOOD PRESSURE: 148 MMHG

## 2019-09-04 DIAGNOSIS — I10 ESSENTIAL HYPERTENSION: ICD-10-CM

## 2019-09-04 DIAGNOSIS — E78.5 DYSLIPIDEMIA: ICD-10-CM

## 2019-09-04 DIAGNOSIS — F40.01 PANIC DISORDER WITH AGORAPHOBIA AND MILD PANIC ATTACKS: ICD-10-CM

## 2019-09-04 DIAGNOSIS — H83.3X3 NOISE-INDUCED HEARING LOSS OF BOTH EARS: ICD-10-CM

## 2019-09-04 DIAGNOSIS — K76.0 NONALCOHOLIC FATTY LIVER DISEASE: ICD-10-CM

## 2019-09-04 DIAGNOSIS — F33.1 MODERATE EPISODE OF RECURRENT MAJOR DEPRESSIVE DISORDER (HCC): ICD-10-CM

## 2019-09-04 DIAGNOSIS — F12.20 CANNABIS DEPENDENCE (HCC): ICD-10-CM

## 2019-09-04 DIAGNOSIS — K57.90 DIVERTICULOSIS: ICD-10-CM

## 2019-09-04 DIAGNOSIS — D50.0 IRON DEFICIENCY ANEMIA DUE TO CHRONIC BLOOD LOSS: ICD-10-CM

## 2019-09-04 DIAGNOSIS — Z23 NEED FOR VACCINATION: ICD-10-CM

## 2019-09-04 PROCEDURE — 99214 OFFICE O/P EST MOD 30 MIN: CPT | Performed by: NURSE PRACTITIONER

## 2019-09-04 PROCEDURE — 90471 IMMUNIZATION ADMIN: CPT | Performed by: INTERNAL MEDICINE

## 2019-09-04 PROCEDURE — 90833 PSYTX W PT W E/M 30 MIN: CPT | Performed by: NURSE PRACTITIONER

## 2019-09-04 PROCEDURE — 99214 OFFICE O/P EST MOD 30 MIN: CPT | Mod: 25 | Performed by: INTERNAL MEDICINE

## 2019-09-04 PROCEDURE — 90746 HEPB VACCINE 3 DOSE ADULT IM: CPT | Performed by: INTERNAL MEDICINE

## 2019-09-04 RX ORDER — PRAZOSIN HYDROCHLORIDE 2 MG/1
2 CAPSULE ORAL
Qty: 90 CAP | Refills: 0 | Status: SHIPPED | OUTPATIENT
Start: 2019-09-04 | End: 2019-11-06 | Stop reason: SDUPTHER

## 2019-09-04 RX ORDER — LISINOPRIL 40 MG/1
40 TABLET ORAL DAILY
Qty: 90 TAB | Refills: 3 | Status: SHIPPED | OUTPATIENT
Start: 2019-09-04 | End: 2020-10-15

## 2019-09-04 RX ORDER — FLUOXETINE HYDROCHLORIDE 40 MG/1
80 CAPSULE ORAL
Qty: 90 CAP | Refills: 1 | Status: SHIPPED | OUTPATIENT
Start: 2019-09-04 | End: 2019-11-06 | Stop reason: SDUPTHER

## 2019-09-04 RX ORDER — BUSPIRONE HYDROCHLORIDE 15 MG/1
30 TABLET ORAL 2 TIMES DAILY
Refills: 1 | COMMUNITY
Start: 2019-07-29 | End: 2019-09-04 | Stop reason: SDUPTHER

## 2019-09-04 RX ORDER — BUSPIRONE HYDROCHLORIDE 15 MG/1
30 TABLET ORAL 2 TIMES DAILY
Qty: 180 TAB | Refills: 1 | Status: SHIPPED | OUTPATIENT
Start: 2019-09-04 | End: 2019-11-06

## 2019-09-04 RX ORDER — CLONAZEPAM 1 MG/1
0.5 TABLET ORAL 2 TIMES DAILY
Refills: 0 | COMMUNITY
Start: 2019-07-30 | End: 2019-09-04

## 2019-09-04 RX ORDER — CLONAZEPAM 0.5 MG/1
0.5 TABLET ORAL 2 TIMES DAILY
Qty: 60 TAB | Refills: 1 | Status: SHIPPED
Start: 2019-09-04 | End: 2019-10-04

## 2019-09-04 RX ORDER — CLONAZEPAM 0.5 MG/1
0.5 TABLET ORAL 2 TIMES DAILY
Qty: 60 TAB | Refills: 1 | Status: SHIPPED | OUTPATIENT
Start: 2019-09-04 | End: 2019-09-04 | Stop reason: SDUPTHER
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
## 2019-09-04 NOTE — PROGRESS NOTES
CC:  Diagnoses of Diverticulosis, Need for vaccination, Iron deficiency anemia due to chronic blood loss, Nonalcoholic fatty liver disease, Essential hypertension, Noise-induced hearing loss of both ears, and Dyslipidemia were pertinent to this visit.    HISTORY OF THE PRESENT ILLNESS: Patient is a 49 y.o. male. This pleasant patient is here todayTo follow-up.  Here with wife.    Since last visit he did see GI for further work-up of iron deficient anemia.  Labs 4/24/2019 hemoglobin 12.7, iron sat 5%, TIBC 482, iron 24 and B12 278.  He is on iron and B12 supplements.  7/10/2019 upper EGD normal except for erythema of the stomach.  Colonoscopy showed some hemorrhoids and moderate diverticulosis.  Currently pending pill capsule study.  Labs were also negative for celiac, CHILO, TSH.  He is following with Dr. Corbett gastroenterology.    Cardiology note from 8/7/2019 Dr. Segovia was also reviewed.  Regarding his dyslipidemia they are still trying to get approval of Repatha.  Patient denies any current angina, no strokelike symptoms.  He is still trying to quit tobacco, not ready at this time.    Liver enzymes have trended down, history of nonalcoholic fatty liver disease.  Labs from 7/23/2019 show ALT had decreased from 101 down to 65 and normalization of AST.  This is associated with weight loss of approximately 10 pounds.    A lot of history of loud noise exposure, working with heavy equipment, loud music, etc. has subsequent decreased hearing bilaterally.  He was tested through work and was told to get hearing loss.  No ear pain, drainage, sniffing wax.  He would like to see ENT to discuss hearing aids.     Allergies: Patient has no known allergies.    Current Outpatient Medications Ordered in Epic   Medication Sig Dispense Refill   • lisinopril (PRINIVIL, ZESTRIL) 40 MG tablet Take 1 Tab by mouth every day. 90 Tab 3   • fluticasone (FLONASE) 50 MCG/ACT nasal spray 1 SPRAY INTO EACH NOSTRIL EVERY DAY. 1 Bottle 6   •  prazosin (MINIPRESS) 2 MG Cap Take 1 Cap by mouth every day. 30 Cap 0   • fluoxetine (PROZAC) 40 MG capsule Take 2 Caps by mouth every day. 40mg twice daily to equal 80mg 90 Cap 1   • urea (CARMOL) 10 % cream Use after showers and daily all over to dry skin patches. 2 Tube 3   • cyclobenzaprine (FLEXERIL) 10 MG Tab Take 10 mg by mouth as needed.  1   • loratadine (CLARITIN) 10 MG Tab Take 10 mg by mouth every day.     • aspirin (ASA) 81 MG Chew Tab chewable tablet Take 81 mg by mouth every day.     • cyanocobalamin (VITAMIN B12) 1000 MCG Tab Take 1 Tab by mouth every day. 30 Tab 11   • ferrous sulfate 325 (65 Fe) MG tablet Take 1 Tab by mouth 2 Times a Day. 60 Tab 6   • busPIRone (BUSPAR) 30 MG tablet Take 1 Tab by mouth 2 times a day. 180 Tab 1   • atorvastatin (LIPITOR) 80 MG tablet Take 80 mg by mouth every day.  11   • clopidogrel (PLAVIX) 75 MG Tab Take 75 mg by mouth every day.  1   • fenofibrate (TRICOR) 145 MG Tab TAKE 1 TABLET BY ORAL ROUTE EVERY DAY BEFORE BED  5   • omeprazole (PRILOSEC) 20 MG delayed-release capsule Take 20 mg by mouth every day.     • albuterol (VENTOLIN OR PROVENTIL) 108 (90 BASE) MCG/ACT AERS Inhale 2 Puffs by mouth every four hours as needed for Shortness of Breath. 1 Inhaler 0     No current Epic-ordered facility-administered medications on file.        Past Medical History:   Diagnosis Date   • Anxiety    • Arthritis    • COPD (chronic obstructive pulmonary disease) (HCC)    • Depression    • Hyperlipidemia    • Hypertension    • PTSD (post-traumatic stress disorder)    • Stroke (HCC)        Past Surgical History:   Procedure Laterality Date   • ABDOMINAL EXPLORATION     • APPENDECTOMY         Social History     Tobacco Use   • Smoking status: Current Every Day Smoker     Packs/day: 0.50     Years: 38.00     Pack years: 19.00   • Smokeless tobacco: Current User     Types: Chew   • Tobacco comment: down 5/cigarrettes a day or less   Substance Use Topics   • Alcohol use: Yes      "Comment: socially - usually 1 beer twice a year   • Drug use: Yes     Frequency: 7.0 times per week     Types: Marijuana     Comment: nightly to sleep       Social History     Social History Narrative   • Not on file       Family History   Problem Relation Age of Onset   • Hypertension Mother    • Hyperlipidemia Mother    • Hypertension Father    • Lung Disease Father    • Cancer Child         non hodkins lymphoma   • Anxiety disorder Brother        ROS:     - Constitutional: Negative for fever, chills    - Eyes:   Negative for acute change    - ENT:  Negative for  ear pain, ear discharge, rhinorrhea, sinus congestion, sore throat.  Hearing loss per HPI    - Respiratory: Negative for cough    - Cardiovascular: Negative for chest pain    - Gastrointestinal: Negative for abd pain    - Genitourinary: Negative for dysuria    - Skin: Negative for rash    - Neurological: Negative for new focal weakness or strokelike symptoms    - Endo:Negative for polyuria, heat/cold intolerance, excessive thirst    - Hem/lymphatic: Negative for  swollen glands    -Allergic/immun: Negative for allergic rhinitis    - Psychiatric/Behavioral: Negative for  suicidal/homicidal ideation and memory loss.      Exam: /74 (BP Location: Right arm, Patient Position: Sitting, BP Cuff Size: Adult)   Pulse 69   Temp 37.2 °C (98.9 °F) (Temporal)   Resp 16   Ht 1.753 m (5' 9\")   Wt 96.6 kg (213 lb)   SpO2 96%  Body mass index is 31.45 kg/m².    General: Normal appearing. No distress.  EYES: Conjunctiva clear lids without ptosis, pupils equal  EARS: Normal shape and contour canals are clear bilaterally without obstructive wax, no erythema, no tympanic membrane redness or bulging  Pulmonary: Clear to ausculation.  Normal effort. No rales or wheezing.  Cardiovascular: Regular rate and rhythm without significant murmur.   Abdomen: Soft, nontender, nondistended. Normal bowel sounds.  Neurologic: Moves all extremities equally x4  Skin: Warm and dry. "  No obvious lesions.  Musculoskeletal: Normal gait. No extremity cyanosis, clubbing, or edema.  Psych: Normal mood and affect. Alert and oriented x3. Judgment and insight is normal.      Assessment/Plan  1. Diverticulosis  Diverticulosis noted on most recent colonoscopy from July.  Patient to maintain good amounts of fiber in diet, monitor constipation, etc.  Currently chronic and stable.    2. Need for vaccination  Vaccine #2 today.  - Hepatitis B Vaccine Adult IM    3. Iron deficiency anemia due to chronic blood loss  Unclear cause iron deficiency status post EGD and colonoscopy, pending capsule endoscopy.  Recheck iron panel now on iron supplements.  - CBC WITH DIFFERENTIAL; Future  - FERRITIN; Future  - IRON/TOTAL IRON BIND; Future  - VITAMIN B12; Future    4. Nonalcoholic fatty liver disease  Patient is congratulated with his weight loss.  Liver enzyme is trending down.  Asymptomatic, stable, will continue to monitor.  - HEPATIC FUNCTION PANEL; Future    5. Essential hypertension  Chronic, stable medical condition, continue lisinopril 40 mg daily.  - lisinopril (PRINIVIL, ZESTRIL) 40 MG tablet; Take 1 Tab by mouth every day.  Dispense: 90 Tab; Refill: 3    6. Noise-induced hearing loss of both ears  New issue.  Chronic, stable, bilateral hearing loss.  Referral to ENT for consideration of hearing aids.  - REFERRAL TO ENT    Patient not ready for tobacco cessation at today's visit    Cardiology is working on approval for Repatha, lipids not optimally controlled off Repatha.    Return to clinic 3 months or sooner if needed        Please note that this dictation was created using voice recognition software. I have made every reasonable attempt to correct obvious errors, but I expect that there are errors of grammar and possibly content that I did not discover before finalizing the note.

## 2019-10-02 ENCOUNTER — OFFICE VISIT (OUTPATIENT)
Dept: BEHAVIORAL HEALTH | Facility: CLINIC | Age: 49
End: 2019-10-02
Payer: COMMERCIAL

## 2019-10-02 DIAGNOSIS — F33.1 MODERATE EPISODE OF RECURRENT MAJOR DEPRESSIVE DISORDER (HCC): ICD-10-CM

## 2019-10-02 DIAGNOSIS — F40.01 PANIC DISORDER WITH AGORAPHOBIA AND MILD PANIC ATTACKS: ICD-10-CM

## 2019-10-02 DIAGNOSIS — F12.20 CANNABIS DEPENDENCE (HCC): ICD-10-CM

## 2019-10-02 PROCEDURE — 90834 PSYTX W PT 45 MINUTES: CPT | Performed by: PSYCHOLOGIST

## 2019-10-02 NOTE — BH THERAPY
[facility]  Therapy Progress Note    Patient Name: Luciano Silverman  Patient MRN: [id number]  Today's Date: 10/2/2019     Type of session:Individual psychotherapy  Length of session: 45 minutes  Persons in attendance:Patient    Subjective/New Info:   Luciano Silverman is a 48 year old white male referred for assessment by   BENIGNO Tran (nurse practitioner) for the treatment of anxiety and depression. Patient reported that he has been diagnosed with post-traumatic stress disorder which he stated is a result of several major stresses in his life. Patient also reported that he had a T.I.A. In August 2016 and June 2018. Patient is a bit overwhelmed as he continues to struggle with his P.T.S.D. symptoms and recently his wife has been having some medical issues. Talked with patient about his teenage years and early adult years which have apparently been very stormy for patient. He has been dealing with the residual problems from his T.I.A. and remains frustrated because he has not been approved for disability. Talked with patient about his leisure activities and he stated that he has been spending time at the [employer].     Patient did not present in acute distress. Patient was appropriately groomed and cooperative. Patient was alert and oriented to person, place, and time. Eye contact was appropriate. No abnormalities in attention or concentration were noted. No abnormalities of movement present; psychomotor activity was normal. Speech was fluent and regular in rhythm, rate, volume, and tone. Thought processes were linear, logical, and goal-directed. There was no evidence of thought disorder. No auditory or visual hallucinations. Long and short term memory appeared to be intact. Insight, judgment, and impulse control were deemed to be within normal limits. Reported mood was fairly negative. Affect was appropriate and congruent with thought content and conversation. Patient denied current suicidal  and homicidal ideation in plan, intent, and preparatory behavior.        Objective/Observations:  Participation: Active verbal participation, Attentive, Engaged and Open to feedback  Grooming: Casual and Neat  Cognition: Alert and Fully Oriented  Eye contact: Good  Mood: Depressed and Anxious  Affect: Flexible  Thought process: Logical and Goal-directed  Speech:       Diagnoses:   1. Moderate episode of recurrent major depressive disorder (HCC)    2. Panic disorder with agoraphobia and mild panic attacks    3. Cannabis dependence (HCC)         Current risk:   SUICIDE: Low   Homicide: Low   Self-harm: Low   Relapse: Not applicable   Other:    Safety Plan reviewed? Not Indicated   If evidence of imminent risk is present, intervention/plan:     Therapeutic Intervention(s): Cognitive modification, Conflict resolution skills, Distress tolerance skills, Leisure and recreation skills and Supportive psychotherapy    Treatment Goal(s)/Objective(s) addressed:   Reduce symptoms of Depression:  Objective A: Patient will increase activity level by will participating in at least two hours, three times per week in a social or leisure activity with family member or close friend.  Objective B: Patient will express an increase in positive statements about self by will making at least five positive statements per day about self-circumstances.  Objective C: Patient will spend more time with friend in social situations by spending at least thirty minutes four times per week in a social situation, interacting appropriately with a friend.     Reduce Symptoms of Anxiety:  Objective A: Patient will learn one effective technique for dealing with anxiety each week, and utilize it effectively when feeling anxious.  Objective B: Patient will express an increase in positive statements by making at least five positive statements per day about self or current circumstances.  Objective C: Patient will increase activity level by participating in at  least two hours, three times per week in a leisure or social activity.      Progress toward Treatment Goals: No change    Plan:  - Continue Individual therapy    Robert Tapia, Ph.D.  10/2/2019

## 2019-10-09 ENCOUNTER — OFFICE VISIT (OUTPATIENT)
Dept: BEHAVIORAL HEALTH | Facility: CLINIC | Age: 49
End: 2019-10-09
Payer: COMMERCIAL

## 2019-10-09 DIAGNOSIS — F40.01 PANIC DISORDER WITH AGORAPHOBIA AND MILD PANIC ATTACKS: ICD-10-CM

## 2019-10-09 DIAGNOSIS — F12.20 CANNABIS DEPENDENCE (HCC): ICD-10-CM

## 2019-10-09 DIAGNOSIS — F33.1 MODERATE EPISODE OF RECURRENT MAJOR DEPRESSIVE DISORDER (HCC): ICD-10-CM

## 2019-10-09 PROCEDURE — 90834 PSYTX W PT 45 MINUTES: CPT | Performed by: PSYCHOLOGIST

## 2019-10-09 NOTE — BH THERAPY
[facility]  Therapy Progress Note    Patient Name: Luciano Silverman  Patient MRN: [id number]  Today's Date: 10/9/2019     Type of session:Individual psychotherapy  Length of session: 45 minutes  Persons in attendance:Patient    Subjective/New Info:   Luciano Silverman is a 48 year old white male referred for assessment by   BENIGNO Tran (nurse practitioner) for the treatment of anxiety and depression. Patient reported that he has been diagnosed with post-traumatic stress disorder which he stated is a result of several major stresses in his life. Patient also reported that he had a T.I.A. In August 2016 and June 2018. Patient appears a bit better this week. He has spent a little time at the [employer] where he finds peace with the animals. Talked with patient about the importance of being as active as he can tolerate. He has been focused on trying to identify some leisure things that he might enjoy. He remains very frustrated with the process of attempting to get social security disability.      Patient did not present in acute distress. Patient was appropriately groomed and cooperative. Patient was alert and oriented to person, place, and time. Eye contact was appropriate. No abnormalities in attention or concentration were noted. No abnormalities of movement present; psychomotor activity was normal. Speech was fluent and regular in rhythm, rate, volume, and tone. Thought processes were linear, logical, and goal-directed. There was no evidence of thought disorder. No auditory or visual hallucinations. Long and short term memory appeared to be intact. Insight, judgment, and impulse control were deemed to be within normal limits. Reported mood was fairly negative. Affect was appropriate and congruent with thought content and conversation. Patient denied current suicidal and homicidal ideation in plan, intent, and preparatory behavior.        Objective/Observations:  Participation: Active verbal  participation, Attentive, Engaged and Open to feedback  Grooming: Casual and Neat  Cognition: Alert and Fully Oriented  Eye contact: Good  Mood: Depressed and Anxious  Affect: Flexible  Thought process: Logical and Goal-directed  Speech:     Diagnoses:   1. Moderate episode of recurrent major depressive disorder (HCC)    2. Panic disorder with agoraphobia and mild panic attacks    3. Cannabis dependence (HCC)         Current risk:   SUICIDE: Low   Homicide: Low   Self-harm: Low   Relapse: Not applicable   Other:    Safety Plan reviewed? Not Indicated   If evidence of imminent risk is present, intervention/plan:     Therapeutic Intervention(s): Cognitive modification, Conflict resolution skills, Distress tolerance skills, Leisure and recreation skills and Supportive psychotherapy    Treatment Goal(s)/Objective(s) addressed:   Objective A: Patient will increase activity level by will participating in at least two hours, three times per week in a social or leisure activity with family member or close friend.  Objective B: Patient will express an increase in positive statements about self by will making at least five positive statements per day about self-circumstances.  Objective C: Patient will spend more time with friend in social situations by spending at least thirty minutes four times per week in a social situation, interacting appropriately with a friend.     Reduce Symptoms of Anxiety:  Objective A: Patient will learn one effective technique for dealing with anxiety each week, and utilize it effectively when feeling anxious.  Objective B: Patient will express an increase in positive statements by making at least five positive statements per day about self or current circumstances.  Objective C: Patient will increase activity level by participating in at least two hours, three times per week in a leisure or social activity.      Progress toward Treatment Goals: mild improvement    Plan:  - Continue Individual  therapy    Robert Tapia, Ph.D.  10/9/2019
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
## 2019-10-31 ENCOUNTER — DOCUMENTATION (OUTPATIENT)
Dept: BEHAVIORAL HEALTH | Facility: CLINIC | Age: 49
End: 2019-10-31

## 2019-11-02 ENCOUNTER — HOSPITAL ENCOUNTER (OUTPATIENT)
Dept: LAB | Facility: MEDICAL CENTER | Age: 49
End: 2019-11-02
Attending: INTERNAL MEDICINE
Payer: COMMERCIAL

## 2019-11-02 DIAGNOSIS — K76.0 NONALCOHOLIC FATTY LIVER DISEASE: ICD-10-CM

## 2019-11-02 DIAGNOSIS — D50.0 IRON DEFICIENCY ANEMIA DUE TO CHRONIC BLOOD LOSS: ICD-10-CM

## 2019-11-02 LAB
ALBUMIN SERPL BCP-MCNC: 4.3 G/DL (ref 3.2–4.9)
ALP SERPL-CCNC: 46 U/L (ref 30–99)
ALT SERPL-CCNC: 81 U/L (ref 2–50)
AST SERPL-CCNC: 39 U/L (ref 12–45)
BASOPHILS # BLD AUTO: 0.3 % (ref 0–1.8)
BASOPHILS # BLD: 0.02 K/UL (ref 0–0.12)
BILIRUB CONJ SERPL-MCNC: 0.1 MG/DL (ref 0.1–0.5)
BILIRUB INDIRECT SERPL-MCNC: 0.4 MG/DL (ref 0–1)
BILIRUB SERPL-MCNC: 0.5 MG/DL (ref 0.1–1.5)
EOSINOPHIL # BLD AUTO: 0.15 K/UL (ref 0–0.51)
EOSINOPHIL NFR BLD: 2.6 % (ref 0–6.9)
ERYTHROCYTE [DISTWIDTH] IN BLOOD BY AUTOMATED COUNT: 48.6 FL (ref 35.9–50)
FERRITIN SERPL-MCNC: 73.4 NG/ML (ref 22–322)
HCT VFR BLD AUTO: 43.1 % (ref 42–52)
HGB BLD-MCNC: 13.9 G/DL (ref 14–18)
IMM GRANULOCYTES # BLD AUTO: 0.04 K/UL (ref 0–0.11)
IMM GRANULOCYTES NFR BLD AUTO: 0.7 % (ref 0–0.9)
IRON SATN MFR SERPL: 9 % (ref 15–55)
IRON SERPL-MCNC: 42 UG/DL (ref 50–180)
LYMPHOCYTES # BLD AUTO: 1.33 K/UL (ref 1–4.8)
LYMPHOCYTES NFR BLD: 23.1 % (ref 22–41)
MCH RBC QN AUTO: 28.1 PG (ref 27–33)
MCHC RBC AUTO-ENTMCNC: 32.3 G/DL (ref 33.7–35.3)
MCV RBC AUTO: 87.1 FL (ref 81.4–97.8)
MONOCYTES # BLD AUTO: 0.48 K/UL (ref 0–0.85)
MONOCYTES NFR BLD AUTO: 8.3 % (ref 0–13.4)
NEUTROPHILS # BLD AUTO: 3.75 K/UL (ref 1.82–7.42)
NEUTROPHILS NFR BLD: 65 % (ref 44–72)
NRBC # BLD AUTO: 0 K/UL
NRBC BLD-RTO: 0 /100 WBC
PLATELET # BLD AUTO: 248 K/UL (ref 164–446)
PMV BLD AUTO: 10.8 FL (ref 9–12.9)
PROT SERPL-MCNC: 7.4 G/DL (ref 6–8.2)
RBC # BLD AUTO: 4.95 M/UL (ref 4.7–6.1)
TIBC SERPL-MCNC: 444 UG/DL (ref 250–450)
VIT B12 SERPL-MCNC: 458 PG/ML (ref 211–911)
WBC # BLD AUTO: 5.8 K/UL (ref 4.8–10.8)

## 2019-11-02 PROCEDURE — 80076 HEPATIC FUNCTION PANEL: CPT

## 2019-11-02 PROCEDURE — 85025 COMPLETE CBC W/AUTO DIFF WBC: CPT

## 2019-11-02 PROCEDURE — 83550 IRON BINDING TEST: CPT

## 2019-11-02 PROCEDURE — 36415 COLL VENOUS BLD VENIPUNCTURE: CPT

## 2019-11-02 PROCEDURE — 82607 VITAMIN B-12: CPT

## 2019-11-02 PROCEDURE — 82728 ASSAY OF FERRITIN: CPT

## 2019-11-02 PROCEDURE — 83540 ASSAY OF IRON: CPT

## 2019-11-06 ENCOUNTER — OFFICE VISIT (OUTPATIENT)
Dept: BEHAVIORAL HEALTH | Facility: CLINIC | Age: 49
End: 2019-11-06
Payer: COMMERCIAL

## 2019-11-06 VITALS
BODY MASS INDEX: 31.84 KG/M2 | DIASTOLIC BLOOD PRESSURE: 82 MMHG | SYSTOLIC BLOOD PRESSURE: 134 MMHG | HEART RATE: 73 BPM | HEIGHT: 69 IN | RESPIRATION RATE: 16 BRPM | WEIGHT: 215 LBS

## 2019-11-06 DIAGNOSIS — F40.01 PANIC DISORDER WITH AGORAPHOBIA AND MILD PANIC ATTACKS: ICD-10-CM

## 2019-11-06 DIAGNOSIS — F33.1 MODERATE EPISODE OF RECURRENT MAJOR DEPRESSIVE DISORDER (HCC): ICD-10-CM

## 2019-11-06 DIAGNOSIS — F12.20 CANNABIS DEPENDENCE (HCC): ICD-10-CM

## 2019-11-06 PROCEDURE — 99214 OFFICE O/P EST MOD 30 MIN: CPT | Performed by: NURSE PRACTITIONER

## 2019-11-06 RX ORDER — CLONAZEPAM 0.5 MG/1
0.5 TABLET ORAL 2 TIMES DAILY
Qty: 60 TAB | Refills: 1 | Status: SHIPPED
Start: 2019-11-06 | End: 2019-12-06

## 2019-11-06 RX ORDER — BUSPIRONE HYDROCHLORIDE 30 MG/1
30 TABLET ORAL 2 TIMES DAILY
Qty: 180 TAB | Refills: 0 | Status: SHIPPED | OUTPATIENT
Start: 2019-11-06 | End: 2020-01-08 | Stop reason: SDUPTHER

## 2019-11-06 RX ORDER — CLONAZEPAM 1 MG/1
0.5 TABLET ORAL 2 TIMES DAILY
Qty: 60 TAB | Refills: 1 | Status: SHIPPED
Start: 2019-11-06 | End: 2019-11-06 | Stop reason: CLARIF

## 2019-11-06 RX ORDER — PRAZOSIN HYDROCHLORIDE 2 MG/1
2 CAPSULE ORAL
Qty: 90 CAP | Refills: 0 | Status: SHIPPED | OUTPATIENT
Start: 2019-11-06 | End: 2020-01-08 | Stop reason: SDUPTHER

## 2019-11-06 RX ORDER — FLUOXETINE HYDROCHLORIDE 40 MG/1
80 CAPSULE ORAL
Qty: 180 CAP | Refills: 0 | Status: SHIPPED | OUTPATIENT
Start: 2019-11-06 | End: 2020-01-08 | Stop reason: SDUPTHER

## 2019-11-13 RX ORDER — FERROUS SULFATE 325(65) MG
TABLET ORAL
Qty: 180 TAB | Refills: 2 | Status: SHIPPED | OUTPATIENT
Start: 2019-11-13 | End: 2020-08-19

## 2019-11-20 ENCOUNTER — OFFICE VISIT (OUTPATIENT)
Dept: BEHAVIORAL HEALTH | Facility: CLINIC | Age: 49
End: 2019-11-20
Payer: COMMERCIAL

## 2019-11-20 DIAGNOSIS — F12.20 CANNABIS DEPENDENCE (HCC): ICD-10-CM

## 2019-11-20 DIAGNOSIS — F40.01 PANIC DISORDER WITH AGORAPHOBIA AND MILD PANIC ATTACKS: ICD-10-CM

## 2019-11-20 DIAGNOSIS — F33.1 MODERATE EPISODE OF RECURRENT MAJOR DEPRESSIVE DISORDER (HCC): ICD-10-CM

## 2019-11-20 PROCEDURE — 90834 PSYTX W PT 45 MINUTES: CPT | Performed by: PSYCHOLOGIST

## 2019-11-21 NOTE — BH THERAPY
[facility]  Therapy Progress Note    Patient Name: Luciano Silverman  Patient MRN: [id number]  Today's Date: 11/20/2019     Type of session:Individual psychotherapy  Length of session: 45 minutes  Persons in attendance:Patient    Subjective/New Info:   Luciano Silverman is a 48 year old white male referred for assessment by BENIGNO Tran (nurse practitioner) for the treatment of anxiety and depression. Patient reported that he has been focused on his wife who has been dealing with some medical issues. He has been making an effort to get out more socially by going to the store with his wife and walking around with his dog. Talked with patient about some of the frustrations that he has with the process of getting disability and also talked briefly about the importance of finding something that he would enjoy to give him something to do. Patient stated that his mood has been relatively stable, but anxious.      Patient did not present in acute distress. Patient was appropriately groomed and cooperative. Patient was alert and oriented to person, place, and time. Eye contact was appropriate. No abnormalities in attention or concentration were noted. No abnormalities of movement present; psychomotor activity was normal. Speech was fluent and regular in rhythm, rate, volume, and tone. Thought processes were linear, logical, and goal-directed. There was no evidence of thought disorder. No auditory or visual hallucinations. Long and short term memory appeared to be intact. Insight, judgment, and impulse control were deemed to be within normal limits. Reported mood was fairly negative. Affect was appropriate and congruent with thought content and conversation. Patient denied current suicidal and homicidal ideation in plan, intent, and preparatory behavior.        Objective/Observations:   Participation: Active verbal participation, Attentive, Engaged and Open to feedback   Grooming: Casual and  Neat   Cognition: Alert and Fully Oriented   Eye contact: Good   Mood: Depressed and Anxious   Affect: Flexible   Thought process: Logical and Goal-directed   Speech: Rate within normal limits and Volume within normal limits   Other:        Diagnoses:   1. Moderate episode of recurrent major depressive disorder (HCC)    2. Panic disorder with agoraphobia and mild panic attacks    3. Cannabis dependence (HCC)         Current risk:   SUICIDE: Low   Homicide: Low   Self-harm: Low   Relapse: Not applicable   Other:    Safety Plan reviewed? Not Indicated   If evidence of imminent risk is present, intervention/plan:     Therapeutic Intervention(s): Cognitive modification, Conflict resolution skills, Distress tolerance skills, Leisure and recreation skills, Stressors assessed and Supportive psychotherapy    Treatment Goal(s)/Objective(s) addressed:   Decrease symptoms of depression  Objective A: Patient will increase activity level by will participating in at least two hours, three times per week in a social or leisure activity with family member or close friend.  Objective B: Patient will express an increase in positive statements about self by will making at least five positive statements per day about self-circumstances.  Objective C: Patient will spend more time with friend in social situations by spending at least thirty minutes four times per week in a social situation, interacting appropriately with a friend.     Reduce Symptoms of Anxiety:  Objective A: Patient will learn one effective technique for dealing with anxiety each week, and utilize it effectively when feeling anxious.  Objective B: Patient will express an increase in positive statements by making at least five positive statements per day about self or current circumstances.  Objective C: Patient will increase activity level by participating in at least two hours, three times per week in a leisure or social activity.      Progress toward Treatment Goals:  Mild improvement    Plan:  - Continue Individual therapy    Robert Tapia, Ph.D.  11/20/2019

## 2019-12-04 ENCOUNTER — OFFICE VISIT (OUTPATIENT)
Dept: MEDICAL GROUP | Facility: MEDICAL CENTER | Age: 49
End: 2019-12-04
Payer: COMMERCIAL

## 2019-12-04 VITALS
HEIGHT: 69 IN | HEART RATE: 67 BPM | DIASTOLIC BLOOD PRESSURE: 90 MMHG | WEIGHT: 212.3 LBS | SYSTOLIC BLOOD PRESSURE: 148 MMHG | RESPIRATION RATE: 16 BRPM | OXYGEN SATURATION: 95 % | TEMPERATURE: 99.3 F | BODY MASS INDEX: 31.44 KG/M2

## 2019-12-04 DIAGNOSIS — I10 ESSENTIAL HYPERTENSION: ICD-10-CM

## 2019-12-04 DIAGNOSIS — D50.0 IRON DEFICIENCY ANEMIA DUE TO CHRONIC BLOOD LOSS: ICD-10-CM

## 2019-12-04 DIAGNOSIS — Z23 NEED FOR VACCINATION: ICD-10-CM

## 2019-12-04 DIAGNOSIS — F17.200 TOBACCO DEPENDENCE: ICD-10-CM

## 2019-12-04 DIAGNOSIS — E78.5 DYSLIPIDEMIA: ICD-10-CM

## 2019-12-04 PROCEDURE — 90632 HEPA VACCINE ADULT IM: CPT | Performed by: INTERNAL MEDICINE

## 2019-12-04 PROCEDURE — 90471 IMMUNIZATION ADMIN: CPT | Performed by: INTERNAL MEDICINE

## 2019-12-04 PROCEDURE — 99214 OFFICE O/P EST MOD 30 MIN: CPT | Mod: 25 | Performed by: INTERNAL MEDICINE

## 2019-12-04 RX ORDER — HYDROCHLOROTHIAZIDE 12.5 MG/1
12.5 CAPSULE, GELATIN COATED ORAL DAILY
Qty: 30 CAP | Refills: 3 | Status: SHIPPED | OUTPATIENT
Start: 2019-12-04 | End: 2020-05-04

## 2019-12-04 NOTE — PROGRESS NOTES
CC:  Diagnoses of Tobacco dependence, Essential hypertension, Dyslipidemia, Iron deficiency anemia due to chronic blood loss, and Need for vaccination were pertinent to this visit.    HISTORY OF THE PRESENT ILLNESS: Patient is a 49 y.o. male. This pleasant patient is here today for routine follow-up.    Patient blood pressure elevated in clinic 140/90, consistently elevated in clinic and home recordings.  He is compliant lisinopril 40 mg a day.  He is willing to have hydrochlorothiazide added.    He is working with Dr. Corbett for the CLAUDE and elevated ALT, this has increased from 65 up to 81 on labs 11/2/2019.  He denies any abd symptoms.   He is on iron supplements, hemoglobin increased from 12.7 up to 13.9 labs 11/2/2019 and over the same timeframe MCV has gone from 77.8 up to now normal 87.  He did have upper and lower endoscopy he is currently pending a capsule study.  B12 levels have also normalized going from 278 up to 458 on labs 11/2/2019 on a B12 supplement.    Cardiology note from 11/11/2019 was reviewed Dr. Segovia indicates that patient is doing well Lipitor, fenofibrate and Repatha which patient confirms.  I plan to see him again in 3 months.  Patient denies any cardiopulmonary symptoms.  He has started Repatha since our last visit.    He did see the ENT specialist for his hearing loss since our last appointment he reports 30% hearing loss and he is considering hearing aids if approved by his insurance company.    Still trying to quit tobacco he is currently at 1 pack every 4 days.  He does not feel he needs any pharmaceutical medication for me to help quit.  No new pulmonary symptoms.    Allergies: Patient has no known allergies.    Current Outpatient Medications Ordered in Epic   Medication Sig Dispense Refill   • hydrochlorothiazide (MICROZIDE) 12.5 MG capsule Take 1 Cap by mouth every day. 30 Cap 3   • ferrous sulfate 325 (65 Fe) MG tablet TAKE 1 TABLET BY MOUTH TWICE A  Tab 2   • busPIRone  (BUSPAR) 30 MG tablet Take 1 Tab by mouth 2 times a day. 180 Tab 0   • fluoxetine (PROZAC) 40 MG capsule Take 2 Caps by mouth every day. 40mg twice daily to equal 80mg 180 Cap 0   • prazosin (MINIPRESS) 2 MG Cap Take 1 Cap by mouth every day. 90 Cap 0   • clonazePAM (KLONOPIN) 0.5 MG Tab Take 1 Tab by mouth 2 times a day for 30 days. 60 Tab 1   • lisinopril (PRINIVIL, ZESTRIL) 40 MG tablet Take 1 Tab by mouth every day. 90 Tab 3   • fluticasone (FLONASE) 50 MCG/ACT nasal spray 1 SPRAY INTO EACH NOSTRIL EVERY DAY. 1 Bottle 6   • urea (CARMOL) 10 % cream Use after showers and daily all over to dry skin patches. 2 Tube 3   • cyclobenzaprine (FLEXERIL) 10 MG Tab Take 10 mg by mouth as needed.  1   • loratadine (CLARITIN) 10 MG Tab Take 10 mg by mouth every day.     • aspirin (ASA) 81 MG Chew Tab chewable tablet Take 81 mg by mouth every day.     • cyanocobalamin (VITAMIN B12) 1000 MCG Tab Take 1 Tab by mouth every day. 30 Tab 11   • atorvastatin (LIPITOR) 80 MG tablet Take 80 mg by mouth every day.  11   • clopidogrel (PLAVIX) 75 MG Tab Take 75 mg by mouth every day.  1   • fenofibrate (TRICOR) 145 MG Tab TAKE 1 TABLET BY ORAL ROUTE EVERY DAY BEFORE BED  5   • omeprazole (PRILOSEC) 20 MG delayed-release capsule Take 20 mg by mouth every day.     • albuterol (VENTOLIN OR PROVENTIL) 108 (90 BASE) MCG/ACT AERS Inhale 2 Puffs by mouth every four hours as needed for Shortness of Breath. 1 Inhaler 0     No current Epic-ordered facility-administered medications on file.        Past Medical History:   Diagnosis Date   • Anxiety    • Arthritis    • COPD (chronic obstructive pulmonary disease) (HCC)    • Depression    • Hyperlipidemia    • Hypertension    • PTSD (post-traumatic stress disorder)    • Stroke (HCC)        Past Surgical History:   Procedure Laterality Date   • ABDOMINAL EXPLORATION     • APPENDECTOMY         Social History     Tobacco Use   • Smoking status: Current Every Day Smoker     Packs/day: 0.50     Years:  "38.00     Pack years: 19.00   • Smokeless tobacco: Current User     Types: Chew   • Tobacco comment: down 5/cigarrettes a day or less   Substance Use Topics   • Alcohol use: Yes     Comment: socially - usually 1 beer twice a year   • Drug use: Yes     Frequency: 7.0 times per week     Types: Marijuana     Comment: nightly to sleep       Social History     Patient does not qualify to have social determinant information on file (likely too young).   Social History Narrative   • Not on file       Family History   Problem Relation Age of Onset   • Hypertension Mother    • Hyperlipidemia Mother    • Hypertension Father    • Lung Disease Father    • Cancer Child         non hodkins lymphoma   • Anxiety disorder Brother        ROS:     - Constitutional: Negative for fever, chills    - Eyes:   Negative for blurry vision, eye pain, discharge    - ENT:  Negative for hearing changes, ear pain, ear discharge, rhinorrhea, sinus congestion, sore throat     - Respiratory: Negative for cough, sputum production, chest congestion, dyspnea, wheezing, and crackles.      - Cardiovascular: Negative for chest pain, palpitations, orthopnea, and bilateral lower extremity edema.     - Gastrointestinal: Negative for heartburn,  vomiting, abdominal pain, hematochezia, melena, diarrhea, constipation, and greasy/foul-smelling stools.     - Genitourinary: Negative for dysuria    - Musculoskeletal: Negative for myalgias, back pain, and joint pain.     - Skin: Negative for rash, itching, cyanotic skin color change.     - Endo:Negative for polyuria, heat/cold intolerance, excessive thirst    - Hem/lymphatic: Negative for  swollen glands    -Allergic/immun: Negative for allergic rhinitis    - Psychiatric/Behavioral: Negative for suicidal/homicidal ideation and memory loss.      Exam: /90 (BP Location: Right arm, Patient Position: Sitting, BP Cuff Size: Adult)   Pulse 67   Temp 37.4 °C (99.3 °F) (Temporal)   Resp 16   Ht 1.753 m (5' 9\")   " Wt 96.3 kg (212 lb 4.9 oz)   SpO2 95%  Body mass index is 31.35 kg/m².    General: Normal appearing. No distress.  EYES: Conjunctiva clear lids without ptosis, pupils equal  EARS: Normal shape and contour   Pulmonary: Clear to ausculation.  Normal effort. No rales or wheezing.  Cardiovascular: Regular rate and rhythm without significant murmur.   Abdomen: Soft, nontender, nondistended. Normal bowel sounds.  Neurologic: Cranial nerves grossly nonfocal  Skin: Warm and dry.  No obvious lesions.  Musculoskeletal: Normal gait. No extremity cyanosis, clubbing, or edema.  Psych: Normal mood and affect. Alert and oriented x3. Judgment and insight is normal.      Assessment/Plan  1. Tobacco dependence  Stable, chronic issue, patient case he is cutting down.  He will let me know if he needs any pharmaceutical assistance.    2. Essential hypertension  Chronic issue, not optimally controlled, we will add hydrochlorothiazide to his current lisinopril 40 mg daily.  Follow-up in clinic 2 weeks with labs prior to monitor electrolytes  - hydrochlorothiazide (MICROZIDE) 12.5 MG capsule; Take 1 Cap by mouth every day.  Dispense: 30 Cap; Refill: 3  - Basic Metabolic Panel; Future    3. Dyslipidemia  Continue Repatha, fenofibrate and Lipitor.  Follow-up cardiology.  Chronic, improving levels of lipids.    4. Iron deficiency anemia due to chronic blood loss  Stable and improved condition, has had upper and lower endoscopy and is pending capsule study with Dr. Corbett his gastroenterologist.  Continue iron for now.    5. Need for vaccination  - Hepatitis A Vaccine Adult IM      Return to clinic 2 weeks follow-up blood pressure        Please note that this dictation was created using voice recognition software. I have made every reasonable attempt to correct obvious errors, but I expect that there are errors of grammar and possibly content that I did not discover before finalizing the note.

## 2019-12-18 ENCOUNTER — APPOINTMENT (OUTPATIENT)
Dept: MEDICAL GROUP | Facility: MEDICAL CENTER | Age: 49
End: 2019-12-18
Payer: MEDICAID

## 2019-12-26 ENCOUNTER — HOSPITAL ENCOUNTER (OUTPATIENT)
Dept: LAB | Facility: MEDICAL CENTER | Age: 49
End: 2019-12-26
Attending: INTERNAL MEDICINE
Payer: COMMERCIAL

## 2019-12-26 DIAGNOSIS — I10 ESSENTIAL HYPERTENSION: ICD-10-CM

## 2019-12-26 LAB
ALBUMIN SERPL BCP-MCNC: 4.5 G/DL (ref 3.2–4.9)
ALBUMIN/GLOB SERPL: 1.6 G/DL
ALP SERPL-CCNC: 45 U/L (ref 30–99)
ALT SERPL-CCNC: 80 U/L (ref 2–50)
ANION GAP SERPL CALC-SCNC: 5 MMOL/L (ref 0–11.9)
ANION GAP SERPL CALC-SCNC: 6 MMOL/L (ref 0–11.9)
AST SERPL-CCNC: 50 U/L (ref 12–45)
BILIRUB SERPL-MCNC: 0.5 MG/DL (ref 0.1–1.5)
BUN SERPL-MCNC: 11 MG/DL (ref 8–22)
BUN SERPL-MCNC: 12 MG/DL (ref 8–22)
CALCIUM SERPL-MCNC: 9.4 MG/DL (ref 8.5–10.5)
CALCIUM SERPL-MCNC: 9.5 MG/DL (ref 8.5–10.5)
CHLORIDE SERPL-SCNC: 101 MMOL/L (ref 96–112)
CHLORIDE SERPL-SCNC: 102 MMOL/L (ref 96–112)
CHOLEST SERPL-MCNC: 116 MG/DL (ref 100–199)
CO2 SERPL-SCNC: 28 MMOL/L (ref 20–33)
CO2 SERPL-SCNC: 29 MMOL/L (ref 20–33)
CREAT SERPL-MCNC: 1.02 MG/DL (ref 0.5–1.4)
CREAT SERPL-MCNC: 1.03 MG/DL (ref 0.5–1.4)
FASTING STATUS PATIENT QL REPORTED: NORMAL
FASTING STATUS PATIENT QL REPORTED: NORMAL
GLOBULIN SER CALC-MCNC: 2.9 G/DL (ref 1.9–3.5)
GLUCOSE SERPL-MCNC: 95 MG/DL (ref 65–99)
GLUCOSE SERPL-MCNC: 97 MG/DL (ref 65–99)
HDLC SERPL-MCNC: 31 MG/DL
LDLC SERPL CALC-MCNC: 60 MG/DL
POTASSIUM SERPL-SCNC: 4 MMOL/L (ref 3.6–5.5)
POTASSIUM SERPL-SCNC: 4.1 MMOL/L (ref 3.6–5.5)
PROT SERPL-MCNC: 7.4 G/DL (ref 6–8.2)
SODIUM SERPL-SCNC: 135 MMOL/L (ref 135–145)
SODIUM SERPL-SCNC: 136 MMOL/L (ref 135–145)
TRIGL SERPL-MCNC: 126 MG/DL (ref 0–149)

## 2019-12-26 PROCEDURE — 80048 BASIC METABOLIC PNL TOTAL CA: CPT

## 2019-12-26 PROCEDURE — 80053 COMPREHEN METABOLIC PANEL: CPT

## 2019-12-26 PROCEDURE — 36415 COLL VENOUS BLD VENIPUNCTURE: CPT

## 2019-12-26 PROCEDURE — 80061 LIPID PANEL: CPT

## 2020-01-02 ENCOUNTER — OFFICE VISIT (OUTPATIENT)
Dept: MEDICAL GROUP | Facility: MEDICAL CENTER | Age: 50
End: 2020-01-02
Payer: COMMERCIAL

## 2020-01-02 VITALS
OXYGEN SATURATION: 96 % | HEART RATE: 71 BPM | TEMPERATURE: 98.8 F | SYSTOLIC BLOOD PRESSURE: 122 MMHG | RESPIRATION RATE: 16 BRPM | HEIGHT: 69 IN | BODY MASS INDEX: 31.35 KG/M2 | WEIGHT: 211.64 LBS | DIASTOLIC BLOOD PRESSURE: 70 MMHG

## 2020-01-02 DIAGNOSIS — R73.03 PREDIABETES: ICD-10-CM

## 2020-01-02 DIAGNOSIS — I10 ESSENTIAL HYPERTENSION: ICD-10-CM

## 2020-01-02 DIAGNOSIS — F17.200 TOBACCO DEPENDENCE: ICD-10-CM

## 2020-01-02 DIAGNOSIS — E78.5 DYSLIPIDEMIA: ICD-10-CM

## 2020-01-02 DIAGNOSIS — R74.8 ELEVATED LIVER ENZYMES: ICD-10-CM

## 2020-01-02 DIAGNOSIS — D50.0 IRON DEFICIENCY ANEMIA DUE TO CHRONIC BLOOD LOSS: ICD-10-CM

## 2020-01-02 PROCEDURE — 99214 OFFICE O/P EST MOD 30 MIN: CPT | Performed by: INTERNAL MEDICINE

## 2020-01-02 RX ORDER — EVOLOCUMAB 140 MG/ML
1 INJECTION, SOLUTION SUBCUTANEOUS
COMMUNITY
Start: 2019-11-21 | End: 2022-02-24

## 2020-01-02 RX ORDER — CLONAZEPAM 0.5 MG/1
1 TABLET ORAL 2 TIMES DAILY PRN
COMMUNITY
Start: 2019-12-29 | End: 2020-01-08 | Stop reason: SDUPTHER

## 2020-01-07 PROBLEM — F43.10 PTSD (POST-TRAUMATIC STRESS DISORDER): Status: ACTIVE | Noted: 2020-01-07
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
## 2020-01-08 ENCOUNTER — OFFICE VISIT (OUTPATIENT)
Dept: BEHAVIORAL HEALTH | Facility: CLINIC | Age: 50
End: 2020-01-08
Payer: COMMERCIAL

## 2020-01-08 VITALS
RESPIRATION RATE: 15 BRPM | HEART RATE: 73 BPM | DIASTOLIC BLOOD PRESSURE: 70 MMHG | SYSTOLIC BLOOD PRESSURE: 118 MMHG | WEIGHT: 212 LBS | HEIGHT: 69 IN | BODY MASS INDEX: 31.4 KG/M2

## 2020-01-08 DIAGNOSIS — F43.10 PTSD (POST-TRAUMATIC STRESS DISORDER): ICD-10-CM

## 2020-01-08 DIAGNOSIS — F12.20 CANNABIS DEPENDENCE (HCC): ICD-10-CM

## 2020-01-08 DIAGNOSIS — F33.0 MILD EPISODE OF RECURRENT MAJOR DEPRESSIVE DISORDER (HCC): ICD-10-CM

## 2020-01-08 DIAGNOSIS — F40.01 PANIC DISORDER WITH AGORAPHOBIA AND MILD PANIC ATTACKS: ICD-10-CM

## 2020-01-08 PROCEDURE — 99213 OFFICE O/P EST LOW 20 MIN: CPT | Performed by: NURSE PRACTITIONER

## 2020-01-08 RX ORDER — PRAZOSIN HYDROCHLORIDE 2 MG/1
2 CAPSULE ORAL
Qty: 90 CAP | Refills: 0 | Status: SHIPPED | OUTPATIENT
Start: 2020-01-08 | End: 2020-03-23 | Stop reason: SDUPTHER

## 2020-01-08 RX ORDER — FLUOXETINE HYDROCHLORIDE 40 MG/1
80 CAPSULE ORAL
Qty: 180 CAP | Refills: 0 | Status: SHIPPED | OUTPATIENT
Start: 2020-01-08 | End: 2020-03-23 | Stop reason: SDUPTHER

## 2020-01-08 RX ORDER — BUSPIRONE HYDROCHLORIDE 30 MG/1
30 TABLET ORAL 2 TIMES DAILY
Qty: 180 TAB | Refills: 0 | Status: SHIPPED | OUTPATIENT
Start: 2020-01-08 | End: 2020-03-23 | Stop reason: SDUPTHER

## 2020-01-08 RX ORDER — CLONAZEPAM 0.5 MG/1
0.5 TABLET ORAL 2 TIMES DAILY PRN
Qty: 60 TAB | Refills: 0 | Status: SHIPPED
Start: 2020-01-08 | End: 2020-02-07

## 2020-01-15 ENCOUNTER — OFFICE VISIT (OUTPATIENT)
Dept: BEHAVIORAL HEALTH | Facility: CLINIC | Age: 50
End: 2020-01-15
Payer: COMMERCIAL

## 2020-01-15 DIAGNOSIS — F40.01 PANIC DISORDER WITH AGORAPHOBIA AND MILD PANIC ATTACKS: ICD-10-CM

## 2020-01-15 DIAGNOSIS — F43.10 PTSD (POST-TRAUMATIC STRESS DISORDER): ICD-10-CM

## 2020-01-15 DIAGNOSIS — F33.0 MILD EPISODE OF RECURRENT MAJOR DEPRESSIVE DISORDER (HCC): ICD-10-CM

## 2020-01-15 PROCEDURE — 90834 PSYTX W PT 45 MINUTES: CPT | Performed by: PSYCHOLOGIST

## 2020-01-15 NOTE — BH THERAPY
[facility]  Therapy Progress Note    Patient Name: Luciano Silverman  Patient MRN: [id number]  Today's Date: 1/15/2020     Type of session:Individual psychotherapy  Length of session: 45 minutes  Persons in attendance:Patient    Luciano Silverman is a 48 year old white male referred for assessment by BENIGNO Tran (nurse practitioner) for the treatment of anxiety and depression. Patient reported that things have been a bit more calm the past few weeks. His wife has returned to work and patient has been attempting to keep himself occupied around the house, mostly spending time with is dogs. He continues to be quite frustrated with his disability claim but is optimistic that it will be resolved soon. Talked with patient about the importance of increasing his activity level, such as taking short walks with his dog. Patient stated that he continues to be very anxious in social situations and tends to avoid them whenever possible.      Patient did not present in acute distress. Patient was appropriately groomed and cooperative. Patient was alert and oriented to person, place, and time. Eye contact was appropriate. No abnormalities in attention or concentration were noted. No abnormalities of movement present; psychomotor activity was normal. Speech was fluent and regular in rhythm, rate, volume, and tone. Thought processes were linear, logical, and goal-directed. There was no evidence of thought disorder. No auditory or visual hallucinations. Long and short term memory appeared to be intact. Insight, judgment, and impulse control were deemed to be within normal limits. Reported mood was fairly negative. Affect was appropriate and congruent with thought content and conversation. Patient denied current suicidal and homicidal ideation in plan, intent, and preparatory behavior.        Objective/Observations:              Participation: Active verbal participation, Attentive, Engaged and Open to feedback               Grooming: Casual and Neat              Cognition: Alert and Fully Oriented              Eye contact: Good              Mood: Depressed and Anxious              Affect: Flexible              Thought process: Logical and Goal-directed              Speech: Rate within normal limits and Volume within normal limits              Other:    Subjective/New Info:       Diagnoses:   1. Mild episode of recurrent major depressive disorder (HCC)    2. Panic disorder with agoraphobia and mild panic attacks    3. PTSD (post-traumatic stress disorder)         Current risk:   SUICIDE: Low   Homicide: Low   Self-harm: Low   Relapse: Not applicable   Other:    Safety Plan reviewed? Not Indicated   If evidence of imminent risk is present, intervention/plan:     Therapeutic Intervention(s): Cognitive modification, Conflict resolution skills, Distress tolerance skills, Leisure and recreation skills, Stressors assessed and Supportive psychotherapy    Treatment Goal(s)/Objective(s) addressed:   Decrease symptoms of depression  Objective A: Patient will increase activity level by will participating in at least two hours, three times per week in a social or leisure activity with family member or close friend.  Objective B: Patient will express an increase in positive statements about self by will making at least five positive statements per day about self-circumstances.  Objective C: Patient will spend more time with friend in social situations by spending at least thirty minutes four times per week in a social situation, interacting appropriately with a friend.     Reduce Symptoms of Anxiety:  Objective A: Patient will learn one effective technique for dealing with anxiety each week, and utilize it effectively when feeling anxious.  Objective B: Patient will express an increase in positive statements by making at least five positive statements per day about self or current circumstances.  Objective C: Patient will increase activity  level by participating in at least two hours, three times per week in a leisure or social activity.      Progress toward Treatment Goals: Mild improvement    Plan:  - Continue Individual therapy    Robert Tapia, Ph.D.  1/15/2020

## 2020-02-18 RX ORDER — OMEGA-3/DHA/EPA/FISH OIL 35-113.5MG
TABLET,CHEWABLE ORAL
Qty: 90 TAB | Refills: 3 | Status: SHIPPED | OUTPATIENT
Start: 2020-02-18 | End: 2021-02-23

## 2020-03-03 ENCOUNTER — PATIENT MESSAGE (OUTPATIENT)
Dept: MEDICAL GROUP | Facility: MEDICAL CENTER | Age: 50
End: 2020-03-03

## 2020-03-03 ENCOUNTER — HOSPITAL ENCOUNTER (OUTPATIENT)
Dept: RADIOLOGY | Facility: MEDICAL CENTER | Age: 50
End: 2020-03-03
Attending: NURSE PRACTITIONER
Payer: COMMERCIAL

## 2020-03-03 DIAGNOSIS — R93.89 ABNORMAL FINDING ON IMAGING: ICD-10-CM

## 2020-03-03 DIAGNOSIS — M54.6 PAIN IN THORACIC SPINE: ICD-10-CM

## 2020-03-03 PROCEDURE — 72072 X-RAY EXAM THORAC SPINE 3VWS: CPT

## 2020-03-05 ENCOUNTER — HOSPITAL ENCOUNTER (OUTPATIENT)
Dept: RADIOLOGY | Facility: MEDICAL CENTER | Age: 50
End: 2020-03-05
Attending: INTERNAL MEDICINE
Payer: COMMERCIAL

## 2020-03-05 DIAGNOSIS — R93.89 ABNORMAL FINDING ON IMAGING: ICD-10-CM

## 2020-03-05 PROCEDURE — 71046 X-RAY EXAM CHEST 2 VIEWS: CPT

## 2020-03-09 ENCOUNTER — OFFICE VISIT (OUTPATIENT)
Dept: MEDICAL GROUP | Facility: MEDICAL CENTER | Age: 50
End: 2020-03-09
Payer: COMMERCIAL

## 2020-03-09 VITALS
SYSTOLIC BLOOD PRESSURE: 120 MMHG | BODY MASS INDEX: 30.87 KG/M2 | RESPIRATION RATE: 15 BRPM | DIASTOLIC BLOOD PRESSURE: 52 MMHG | HEIGHT: 69 IN | OXYGEN SATURATION: 91 % | WEIGHT: 208.4 LBS | HEART RATE: 67 BPM | TEMPERATURE: 99.1 F

## 2020-03-09 DIAGNOSIS — R05.9 COUGH: ICD-10-CM

## 2020-03-09 DIAGNOSIS — Z23 NEED FOR VACCINATION: ICD-10-CM

## 2020-03-09 DIAGNOSIS — R93.89 ABNORMAL CHEST X-RAY: ICD-10-CM

## 2020-03-09 DIAGNOSIS — R74.8 ELEVATED LIVER ENZYMES: ICD-10-CM

## 2020-03-09 DIAGNOSIS — J44.9 CHRONIC OBSTRUCTIVE PULMONARY DISEASE, UNSPECIFIED COPD TYPE (HCC): ICD-10-CM

## 2020-03-09 DIAGNOSIS — R73.03 PREDIABETES: ICD-10-CM

## 2020-03-09 DIAGNOSIS — J40 BRONCHITIS: ICD-10-CM

## 2020-03-09 DIAGNOSIS — F40.01 PANIC DISORDER WITH AGORAPHOBIA: ICD-10-CM

## 2020-03-09 DIAGNOSIS — Z12.5 ENCOUNTER FOR SCREENING FOR MALIGNANT NEOPLASM OF PROSTATE: ICD-10-CM

## 2020-03-09 PROCEDURE — 99214 OFFICE O/P EST MOD 30 MIN: CPT | Mod: 25 | Performed by: INTERNAL MEDICINE

## 2020-03-09 PROCEDURE — 90746 HEPB VACCINE 3 DOSE ADULT IM: CPT | Performed by: INTERNAL MEDICINE

## 2020-03-09 PROCEDURE — 90471 IMMUNIZATION ADMIN: CPT | Performed by: INTERNAL MEDICINE

## 2020-03-09 RX ORDER — PREGABALIN 75 MG/1
1 CAPSULE ORAL 2 TIMES DAILY
COMMUNITY
Start: 2020-02-26 | End: 2021-02-23

## 2020-03-09 RX ORDER — CLONAZEPAM 0.5 MG/1
TABLET ORAL
Qty: 60 TAB | Refills: 0 | Status: SHIPPED | OUTPATIENT
Start: 2020-03-09 | End: 2020-03-23 | Stop reason: SDUPTHER

## 2020-03-09 RX ORDER — ALBUTEROL SULFATE 90 UG/1
2 AEROSOL, METERED RESPIRATORY (INHALATION) EVERY 4 HOURS PRN
Qty: 1 INHALER | Refills: 0 | Status: SHIPPED | OUTPATIENT
Start: 2020-03-09

## 2020-03-09 SDOH — HEALTH STABILITY: MENTAL HEALTH: HOW OFTEN DO YOU HAVE A DRINK CONTAINING ALCOHOL?: MONTHLY OR LESS

## 2020-03-09 ASSESSMENT — FIBROSIS 4 INDEX: FIB4 SCORE: 1.13

## 2020-03-09 NOTE — PROGRESS NOTES
CC:  Diagnoses of Chronic obstructive pulmonary disease, unspecified COPD type (HCC), Need for vaccination, Abnormal chest x-ray, Elevated liver enzymes, Encounter for screening for malignant neoplasm of prostate, and Prediabetes were pertinent to this visit.    HISTORY OF THE PRESENT ILLNESS: Patient is a 49 y.o. male. This pleasant patient is here today with his wife primarily to follow-up abnormal imaging.  Patient did see a specialist for his back pain, thoracic x-ray 3/20/2020 showed no fracture or subluxation but there were multilevel degenerative changes.  Also incidentally noted signs of pulmonary opacities thought to possibly be inflammatory per the radiologist.  We did do a follow-up chest x-ray 3/5/2020 which showed perihilar linear and interstitial prominence with the radiologist indicating could be bronchitis or viral infection.  Patient does say approximately 1 week prior to his x-ray he was having upper respiratory symptoms that he thought was due to upper respiratory viral infection.  His symptoms did resolve approximately 1 week prior to his chest x-ray.  High clinical diagnosis COPD with a smoking history, and clinical data.  He is agreeable to pulmonary function testing to further define if insurance will cover.  He says his baseline symptoms are unchanged she gets shortness of breath on exertion such as doing 15 minutes a yard work and this has not changed in many years.  Occasional cough, typically phlegm is clear in color.  The time of his upper respiratory infection he was having some green phlegm in the spontaneously resolved.  Uses albuterol for all symptoms 2-3 times per month.  Denies any current fever, chills, night sweats.  He says his breathing is at baseline.  He also quit smoking 2 weeks ago. Wife's ex-  of diabetes and smoking-related disease and that is when he decided that he would quit.    With his chronic thoracic pain he says he is pending follow-up with a specialist.   He is also pending EMG with his neurologist Dr. Camacho as he is had decreased sensation hands and dropping objects.    Labs ordered by his specialist indicate elevated liver enzymes AST 50, ALT 80 on labs 12/26/2019.  Improvement on lipid management through his cardiologist total cholesterol 116, triglycerides 126, HDL 31 and LDL 60.  Patient denies any right upper quadrant pain, nausea, vomiting.    Allergies: Patient has no known allergies.    Current Outpatient Medications Ordered in Epic   Medication Sig Dispense Refill   • clonazePAM (KLONOPIN) 0.5 MG Tab TAKE ONE TABLET BY MOUTH TWICE A DAY AS NEEDED FOR UP TO 30 DAYS --- F40.01 60 Tab 0   • [facility] VITAMIN B12 1000 MCG Tab TAKE 1 TABLET BY MOUTH EVERY DAY 90 Tab 3   • cyanocobalamin (VITAMIN B12) 1000 MCG Tab Take 1 Tab by mouth every day. 30 Tab 11   • fluoxetine (PROZAC) 40 MG capsule Take 2 Caps by mouth every day. 40mg twice daily to equal 80mg 180 Cap 0   • busPIRone (BUSPAR) 30 MG tablet Take 1 Tab by mouth 2 times a day. 180 Tab 0   • prazosin (MINIPRESS) 2 MG Cap Take 1 Cap by mouth every day. 90 Cap 0   • REPATHA SURECLICK 140 MG/ML Solution Auto-injector Inject 1 Dose as instructed every 14 days.     • [facility] VITAMIN E 400 units Cap Take 2 Capsule by mouth every day.     • hydrochlorothiazide (MICROZIDE) 12.5 MG capsule Take 1 Cap by mouth every day. 30 Cap 3   • ferrous sulfate 325 (65 Fe) MG tablet TAKE 1 TABLET BY MOUTH TWICE A  Tab 2   • lisinopril (PRINIVIL, ZESTRIL) 40 MG tablet Take 1 Tab by mouth every day. 90 Tab 3   • fluticasone (FLONASE) 50 MCG/ACT nasal spray 1 SPRAY INTO EACH NOSTRIL EVERY DAY. 1 Bottle 6   • urea (CARMOL) 10 % cream Use after showers and daily all over to dry skin patches. 2 Tube 3   • cyclobenzaprine (FLEXERIL) 10 MG Tab Take 10 mg by mouth as needed.  1   • loratadine (CLARITIN) 10 MG Tab Take 10 mg by mouth every day.     • aspirin (ASA) 81 MG Chew Tab chewable tablet Take 81 mg by mouth every day.     •  atorvastatin (LIPITOR) 80 MG tablet Take 80 mg by mouth every day.  11   • clopidogrel (PLAVIX) 75 MG Tab Take 75 mg by mouth every day.  1   • fenofibrate (TRICOR) 145 MG Tab TAKE 1 TABLET BY ORAL ROUTE EVERY DAY BEFORE BED  5   • omeprazole (PRILOSEC) 20 MG delayed-release capsule Take 20 mg by mouth every day.     • albuterol (VENTOLIN OR PROVENTIL) 108 (90 BASE) MCG/ACT AERS Inhale 2 Puffs by mouth every four hours as needed for Shortness of Breath. 1 Inhaler 0   • pregabalin (LYRICA) 75 MG Cap Take 1 Cap by mouth 2 Times a Day.       No current [facility]-ordered facility-administered medications on file.        Past Medical History:   Diagnosis Date   • Anxiety    • Arthritis    • COPD (chronic obstructive pulmonary disease) (HCC)    • Depression    • Hyperlipidemia    • Hypertension    • PTSD (post-traumatic stress disorder)    • Stroke (HCC)        Past Surgical History:   Procedure Laterality Date   • ABDOMINAL EXPLORATION     • APPENDECTOMY         Social History     Tobacco Use   • Smoking status: Former Smoker     Packs/day: 0.50     Years: 38.00     Pack years: 19.00     Last attempt to quit: 2020     Years since quittin.0   • Smokeless tobacco: Current User     Types: Chew   • Tobacco comment: down 5/cigarrettes a day or less   Substance Use Topics   • Alcohol use: Yes     Frequency: Monthly or less     Comment: socially - usually 1 beer twice a year   • Drug use: Yes     Frequency: 7.0 times per week     Types: Marijuana     Comment: nightly to sleep       Social History     Social History Narrative   • Not on file       Family History   Problem Relation Age of Onset   • Hypertension Mother    • Hyperlipidemia Mother    • Hypertension Father    • Lung Disease Father    • Cancer Child         non hodkins lymphoma   • Anxiety disorder Brother        ROS:     - Constitutional: Negative for fever, chills    - Respiratory: See HPI    - Cardiovascular: Negative for chest pain, palpitations, orthopnea,  "and bilateral lower extremity edema.     - Gastrointestinal: Negative for nausea, vomiting, abdominal pain, hematochezia, melena or acute change in bowel movements   negative for dysuria    - Musculoskeletal: Chronic thoracic back pain he is pending follow-up with the specialist.    - Skin: Negative for rash, itching, cyanotic skin color change.     - Neurological: Negative for  vertigo    - Endo:Negative for polyuria, heat/cold intolerance, excessive thirst    - Hem/lymphatic: Negative for swollen glands    -Allergic/immun: Negative for allergic rhinitis    - Psychiatric/Behavioral: Negative for  suicidal/homicidal ideation and memory loss.      Exam: /52 (BP Location: Left arm, Patient Position: Sitting, BP Cuff Size: Adult)   Pulse 67   Temp 37.3 °C (99.1 °F) (Temporal)   Resp 15   Ht 1.753 m (5' 9\")   Wt 94.5 kg (208 lb 6.4 oz)   SpO2 91%  Body mass index is 30.78 kg/m².    General: Normal appearing. No distress.  EYES: Conjunctiva clear lids without ptosis, pupils equal  EARS: Normal shape and contour   Pulmonary: Clear to ausculation.  Normal effort. No rales or wheezing.  Cardiovascular: Regular rate and rhythm without significant murmur.   Abdomen: Soft, nontender, nondistended. Normal bowel sounds.  Neurologic: Cranial nerves grossly nonfocal  Skin: Warm and dry.  No obvious lesions.  Musculoskeletal: Normal gait. No extremity cyanosis, clubbing, or edema.  Psych: Normal mood and affect. Alert and oriented x3. Judgment and insight is normal.    Assessment/Plan  1. Chronic obstructive pulmonary disease, unspecified COPD type (HCC)  Stable, chronic, symptoms at baseline, rare use of albuterol.  Patient wishes to proceed with pulmonary function testing to better quantitate if covered by insurance.  Plan to recheck x-ray in approximately 1 month to see if these changes resolved, the suspect was due to his recent upper respiratory viral infection.  Patient inquires about CAT scan for lung cancer " screening, per the [place] preventive health task force guidelines he has to be at least age 55, and meet criteria for pack-year history, he does not meet either criteria at this time.  He was congratulated on tobacco cessation.  - PULMONARY FUNCTION TESTS     2. Need for vaccination  - Hepatitis B Vaccine Adult IM    3. Abnormal chest x-ray  See #1 above  - DX-CHEST-2 VIEWS; Future    4. Elevated liver enzymes  Incidentally noted on labs ordered by his specialist, asymptomatic, plan to follow-up trend.  - Comp Metabolic Panel; Future    5. Encounter for screening for malignant neoplasm of prostate  Patient agreeable as preventive health guideline given birth cohort.  - PROSTATE SPECIFIC AG SCREENING; Future    6. Prediabetes  Reassess status.  Continue diet and exercise as needed.  - HEMOGLOBIN A1C; Future    Return to clinic 6 weeks per patient preference follow-up x-ray and labs          Please note that this dictation was created using voice recognition software. I have made every reasonable attempt to correct obvious errors, but I expect that there are errors of grammar and possibly content that I did not discover before finalizing the note.

## 2020-03-23 ENCOUNTER — OFFICE VISIT (OUTPATIENT)
Dept: BEHAVIORAL HEALTH | Facility: CLINIC | Age: 50
End: 2020-03-23
Payer: COMMERCIAL

## 2020-03-23 VITALS
OXYGEN SATURATION: 98 % | HEART RATE: 60 BPM | BODY MASS INDEX: 30.96 KG/M2 | HEIGHT: 69 IN | WEIGHT: 209 LBS | SYSTOLIC BLOOD PRESSURE: 118 MMHG | DIASTOLIC BLOOD PRESSURE: 68 MMHG

## 2020-03-23 DIAGNOSIS — F40.01 PANIC DISORDER WITH AGORAPHOBIA: ICD-10-CM

## 2020-03-23 DIAGNOSIS — F43.10 PTSD (POST-TRAUMATIC STRESS DISORDER): ICD-10-CM

## 2020-03-23 DIAGNOSIS — F33.0 MILD EPISODE OF RECURRENT MAJOR DEPRESSIVE DISORDER (HCC): ICD-10-CM

## 2020-03-23 DIAGNOSIS — F40.01 PANIC DISORDER WITH AGORAPHOBIA AND MILD PANIC ATTACKS: ICD-10-CM

## 2020-03-23 DIAGNOSIS — F12.20 CANNABIS DEPENDENCE (HCC): ICD-10-CM

## 2020-03-23 PROCEDURE — 90792 PSYCH DIAG EVAL W/MED SRVCS: CPT | Performed by: PSYCHIATRY & NEUROLOGY

## 2020-03-23 PROCEDURE — 99999 PR NO CHARGE: CPT | Performed by: PSYCHIATRY & NEUROLOGY

## 2020-03-23 RX ORDER — BUPROPION HYDROCHLORIDE 150 MG/1
150 TABLET ORAL EVERY MORNING
Qty: 60 TAB | Refills: 2 | Status: SHIPPED | OUTPATIENT
Start: 2020-03-23 | End: 2020-05-18

## 2020-03-23 RX ORDER — CLONAZEPAM 0.5 MG/1
0.5 TABLET ORAL 2 TIMES DAILY PRN
Qty: 60 TAB | Refills: 2 | Status: SHIPPED | OUTPATIENT
Start: 2020-03-23 | End: 2020-05-26 | Stop reason: SDUPTHER

## 2020-03-23 RX ORDER — BUSPIRONE HYDROCHLORIDE 30 MG/1
30 TABLET ORAL 2 TIMES DAILY
Qty: 60 TAB | Refills: 2 | Status: SHIPPED | OUTPATIENT
Start: 2020-03-23 | End: 2020-05-26 | Stop reason: SDUPTHER

## 2020-03-23 RX ORDER — PRAZOSIN HYDROCHLORIDE 2 MG/1
4 CAPSULE ORAL
Qty: 60 CAP | Refills: 2 | Status: SHIPPED | OUTPATIENT
Start: 2020-03-23 | End: 2020-05-26 | Stop reason: SDUPTHER

## 2020-03-23 RX ORDER — FLUOXETINE HYDROCHLORIDE 40 MG/1
80 CAPSULE ORAL
Qty: 60 CAP | Refills: 2 | Status: SHIPPED | OUTPATIENT
Start: 2020-03-23 | End: 2020-05-26 | Stop reason: SDUPTHER

## 2020-03-23 ASSESSMENT — ANXIETY QUESTIONNAIRES
7. FEELING AFRAID AS IF SOMETHING AWFUL MIGHT HAPPEN: MORE THAN HALF THE DAYS
6. BECOMING EASILY ANNOYED OR IRRITABLE: MORE THAN HALF THE DAYS
3. WORRYING TOO MUCH ABOUT DIFFERENT THINGS: NEARLY EVERY DAY
1. FEELING NERVOUS, ANXIOUS, OR ON EDGE: NEARLY EVERY DAY
4. TROUBLE RELAXING: NEARLY EVERY DAY
5. BEING SO RESTLESS THAT IT IS HARD TO SIT STILL: NEARLY EVERY DAY
GAD7 TOTAL SCORE: 19
2. NOT BEING ABLE TO STOP OR CONTROL WORRYING: NEARLY EVERY DAY

## 2020-03-23 ASSESSMENT — PATIENT HEALTH QUESTIONNAIRE - PHQ9
SUM OF ALL RESPONSES TO PHQ QUESTIONS 1-9: 10
CLINICAL INTERPRETATION OF PHQ2 SCORE: 2
5. POOR APPETITE OR OVEREATING: 0 - NOT AT ALL

## 2020-03-23 ASSESSMENT — FIBROSIS 4 INDEX: FIB4 SCORE: 1.13
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
## 2020-04-15 ENCOUNTER — TELEMEDICINE (OUTPATIENT)
Dept: BEHAVIORAL HEALTH | Facility: CLINIC | Age: 50
End: 2020-04-15
Payer: COMMERCIAL

## 2020-04-15 DIAGNOSIS — F43.10 PTSD (POST-TRAUMATIC STRESS DISORDER): ICD-10-CM

## 2020-04-15 DIAGNOSIS — F40.01 PANIC DISORDER WITH AGORAPHOBIA AND MILD PANIC ATTACKS: ICD-10-CM

## 2020-04-15 DIAGNOSIS — F33.0 MILD EPISODE OF RECURRENT MAJOR DEPRESSIVE DISORDER (HCC): ICD-10-CM

## 2020-04-15 PROCEDURE — 90834 PSYTX W PT 45 MINUTES: CPT | Mod: 95,CR | Performed by: PSYCHOLOGIST

## 2020-04-15 NOTE — BH THERAPY
[facility]  Therapy Progress Note    Patient Name: Luciano Silverman  Patient MRN: [id number]  Today's Date: 4/15/2020     Type of session:Individual psychotherapy  Length of session: 45 minutes  Persons in attendance:Patient    This encounter was conducted via Zoom.   Verbal consent was obtained. Patient's identity was verified.    Subjective/New Info:   Luciano Silverman is a 48 year old white male referred for assessment by BENIGNO Tran (nurse practitioner) for the treatment of anxiety and depression. Patient reported that he is doing fairly well considering the circumstances with the coronavirus. He has been staying at home except for medical appointments. He continues to wait to hear about his disability insurance and is somewhat concerned about his finances. Patient has been spending most of his time doing things around the house. Talked with patient about some things that he might do that would not require him to be physical and to be in social situations. He continues to experience significant anxiety when he is around people and at times he states that he has brief panic episodes.      Patient did not present in acute distress. Patient was appropriately groomed and cooperative. Patient was alert and oriented to person, place, and time. Eye contact was appropriate. No abnormalities in attention or concentration were noted. No abnormalities of movement present; psychomotor activity was normal. Speech was fluent and regular in rhythm, rate, volume, and tone. Thought processes were linear, logical, and goal-directed. There was no evidence of thought disorder. No auditory or visual hallucinations. Long and short term memory appeared to be intact. Insight, judgment, and impulse control were deemed to be within normal limits. Reported mood was fairly negative. Affect was appropriate and congruent with thought content and conversation. Patient denied current suicidal and homicidal ideation in plan,  intent, and preparatory behavior.     Objective/Observations:              Participation: Active verbal participation, Attentive, Engaged and Open to feedback              Grooming: Casual and Neat              Cognition: Alert and Fully Oriented              Eye contact: Good              Mood: Depressed and Anxious              Affect: Flexible              Thought process: Logical and Goal-directed              Speech: Rate within normal limits and Volume within normal limits              Other:      Diagnoses:   1. Mild episode of recurrent major depressive disorder (HCC)    2. Panic disorder with agoraphobia and mild panic attacks    3. PTSD (post-traumatic stress disorder)         Current risk:   SUICIDE: Low   Homicide: Low   Self-harm: Low   Relapse: Not applicable   Other:    Safety Plan reviewed? Not Indicated   If evidence of imminent risk is present, intervention/plan:     Therapeutic Intervention(s): Cognitive modification, Conflict resolution skills, Distress tolerance skills, Leisure and recreation skills, Stressors assessed and Supportive psychotherapy    Treatment Goal(s)/Objective(s) addressed:   Decrease symptoms of depression  Objective A: Patient will increase activity level by will participating in at least two hours, three times per week in a social or leisure activity with family member or close friend.  Objective B: Patient will express an increase in positive statements about self by will making at least five positive statements per day about self-circumstances.  Objective C: Patient will spend more time with friend in social situations by spending at least thirty minutes four times per week in a social situation, interacting appropriately with a friend.     Reduce Symptoms of Anxiety:  Objective A: Patient will learn one effective technique for dealing with anxiety each week, and utilize it effectively when feeling anxious.  Objective B: Patient will express an increase in positive  statements by making at least five positive statements per day about self or current circumstances.  Objective C: Patient will increase activity level by participating in at least two hours, three times per week in a leisure or social activity.          Progress toward Treatment Goals: No change    Plan:  - Continue Individual therapy    Robert Tapia, Ph.D.  4/15/2020

## 2020-05-02 DIAGNOSIS — I10 ESSENTIAL HYPERTENSION: ICD-10-CM

## 2020-05-04 DIAGNOSIS — I10 ESSENTIAL HYPERTENSION: ICD-10-CM

## 2020-05-04 RX ORDER — HYDROCHLOROTHIAZIDE 12.5 MG/1
CAPSULE, GELATIN COATED ORAL
Qty: 30 CAP | Refills: 3 | Status: SHIPPED | OUTPATIENT
Start: 2020-05-04 | End: 2020-09-04

## 2020-05-04 RX ORDER — HYDROCHLOROTHIAZIDE 12.5 MG/1
12.5 CAPSULE, GELATIN COATED ORAL DAILY
Qty: 30 CAP | Refills: 3 | Status: SHIPPED | OUTPATIENT
Start: 2020-05-04 | End: 2021-02-23

## 2020-05-07 ENCOUNTER — HOSPITAL ENCOUNTER (OUTPATIENT)
Dept: LAB | Facility: MEDICAL CENTER | Age: 50
End: 2020-05-07
Attending: INTERNAL MEDICINE
Payer: COMMERCIAL

## 2020-05-07 DIAGNOSIS — R73.03 PREDIABETES: ICD-10-CM

## 2020-05-07 DIAGNOSIS — R74.8 ELEVATED LIVER ENZYMES: ICD-10-CM

## 2020-05-07 DIAGNOSIS — Z12.5 ENCOUNTER FOR SCREENING FOR MALIGNANT NEOPLASM OF PROSTATE: ICD-10-CM

## 2020-05-07 DIAGNOSIS — R97.20 ELEVATED PSA: ICD-10-CM

## 2020-05-07 LAB
ALBUMIN SERPL BCP-MCNC: 4.4 G/DL (ref 3.2–4.9)
ALBUMIN SERPL BCP-MCNC: 4.6 G/DL (ref 3.2–4.9)
ALBUMIN/GLOB SERPL: 1.4 G/DL
ALBUMIN/GLOB SERPL: 1.6 G/DL
ALP SERPL-CCNC: 52 U/L (ref 30–99)
ALP SERPL-CCNC: 53 U/L (ref 30–99)
ALT SERPL-CCNC: 79 U/L (ref 2–50)
ALT SERPL-CCNC: 83 U/L (ref 2–50)
ANION GAP SERPL CALC-SCNC: 10 MMOL/L (ref 7–16)
ANION GAP SERPL CALC-SCNC: 11 MMOL/L (ref 7–16)
AST SERPL-CCNC: 34 U/L (ref 12–45)
AST SERPL-CCNC: 37 U/L (ref 12–45)
BILIRUB SERPL-MCNC: 0.2 MG/DL (ref 0.1–1.5)
BILIRUB SERPL-MCNC: 0.2 MG/DL (ref 0.1–1.5)
BUN SERPL-MCNC: 18 MG/DL (ref 8–22)
BUN SERPL-MCNC: 19 MG/DL (ref 8–22)
CALCIUM SERPL-MCNC: 9.7 MG/DL (ref 8.5–10.5)
CALCIUM SERPL-MCNC: 9.7 MG/DL (ref 8.5–10.5)
CHLORIDE SERPL-SCNC: 101 MMOL/L (ref 96–112)
CHLORIDE SERPL-SCNC: 101 MMOL/L (ref 96–112)
CHOLEST SERPL-MCNC: 128 MG/DL (ref 100–199)
CO2 SERPL-SCNC: 26 MMOL/L (ref 20–33)
CO2 SERPL-SCNC: 27 MMOL/L (ref 20–33)
CREAT SERPL-MCNC: 1.02 MG/DL (ref 0.5–1.4)
CREAT SERPL-MCNC: 1.03 MG/DL (ref 0.5–1.4)
EST. AVERAGE GLUCOSE BLD GHB EST-MCNC: 114 MG/DL
FASTING STATUS PATIENT QL REPORTED: NORMAL
GLOBULIN SER CALC-MCNC: 2.8 G/DL (ref 1.9–3.5)
GLOBULIN SER CALC-MCNC: 3.1 G/DL (ref 1.9–3.5)
GLUCOSE SERPL-MCNC: 106 MG/DL (ref 65–99)
GLUCOSE SERPL-MCNC: 107 MG/DL (ref 65–99)
HBA1C MFR BLD: 5.6 % (ref 0–5.6)
HDLC SERPL-MCNC: 34 MG/DL
LDLC SERPL CALC-MCNC: 77 MG/DL
POTASSIUM SERPL-SCNC: 4.4 MMOL/L (ref 3.6–5.5)
POTASSIUM SERPL-SCNC: 4.6 MMOL/L (ref 3.6–5.5)
PROT SERPL-MCNC: 7.4 G/DL (ref 6–8.2)
PROT SERPL-MCNC: 7.5 G/DL (ref 6–8.2)
PSA SERPL-MCNC: 4.05 NG/ML (ref 0–4)
SODIUM SERPL-SCNC: 138 MMOL/L (ref 135–145)
SODIUM SERPL-SCNC: 138 MMOL/L (ref 135–145)
TRIGL SERPL-MCNC: 87 MG/DL (ref 0–149)

## 2020-05-07 PROCEDURE — 36415 COLL VENOUS BLD VENIPUNCTURE: CPT

## 2020-05-07 PROCEDURE — 80053 COMPREHEN METABOLIC PANEL: CPT

## 2020-05-07 PROCEDURE — 83036 HEMOGLOBIN GLYCOSYLATED A1C: CPT

## 2020-05-07 PROCEDURE — 80053 COMPREHEN METABOLIC PANEL: CPT | Mod: 91

## 2020-05-07 PROCEDURE — 84153 ASSAY OF PSA TOTAL: CPT

## 2020-05-07 PROCEDURE — 80061 LIPID PANEL: CPT

## 2020-05-18 RX ORDER — BUPROPION HYDROCHLORIDE 150 MG/1
TABLET ORAL
Qty: 60 TAB | Refills: 2 | Status: SHIPPED | OUTPATIENT
Start: 2020-05-18 | End: 2020-05-26 | Stop reason: SDUPTHER

## 2020-05-19 ENCOUNTER — APPOINTMENT (OUTPATIENT)
Dept: BEHAVIORAL HEALTH | Facility: CLINIC | Age: 50
End: 2020-05-19
Payer: MEDICAID

## 2020-05-26 ENCOUNTER — TELEMEDICINE (OUTPATIENT)
Dept: BEHAVIORAL HEALTH | Facility: CLINIC | Age: 50
End: 2020-05-26
Payer: COMMERCIAL

## 2020-05-26 ENCOUNTER — PATIENT MESSAGE (OUTPATIENT)
Dept: BEHAVIORAL HEALTH | Facility: CLINIC | Age: 50
End: 2020-05-26

## 2020-05-26 VITALS — WEIGHT: 206 LBS | HEIGHT: 69 IN | BODY MASS INDEX: 30.51 KG/M2

## 2020-05-26 DIAGNOSIS — F40.01 PANIC DISORDER WITH AGORAPHOBIA: ICD-10-CM

## 2020-05-26 PROCEDURE — 99213 OFFICE O/P EST LOW 20 MIN: CPT | Mod: 95,CR | Performed by: PSYCHIATRY & NEUROLOGY

## 2020-05-26 PROCEDURE — 90833 PSYTX W PT W E/M 30 MIN: CPT | Mod: 95,CR | Performed by: PSYCHIATRY & NEUROLOGY

## 2020-05-26 RX ORDER — PRAZOSIN HYDROCHLORIDE 2 MG/1
4 CAPSULE ORAL
Qty: 60 CAP | Refills: 2 | Status: SHIPPED | OUTPATIENT
Start: 2020-05-26 | End: 2020-08-20

## 2020-05-26 RX ORDER — BUSPIRONE HYDROCHLORIDE 30 MG/1
30 TABLET ORAL 2 TIMES DAILY
Qty: 60 TAB | Refills: 2 | Status: SHIPPED | OUTPATIENT
Start: 2020-05-26 | End: 2020-07-17

## 2020-05-26 RX ORDER — CLONAZEPAM 0.5 MG/1
0.5 TABLET ORAL 2 TIMES DAILY PRN
Qty: 60 TAB | Refills: 2 | Status: SHIPPED | OUTPATIENT
Start: 2020-05-26 | End: 2020-08-19 | Stop reason: SDUPTHER

## 2020-05-26 RX ORDER — FLUOXETINE HYDROCHLORIDE 40 MG/1
80 CAPSULE ORAL
Qty: 60 CAP | Refills: 2 | Status: SHIPPED | OUTPATIENT
Start: 2020-05-26 | End: 2020-08-19 | Stop reason: SDUPTHER

## 2020-05-26 RX ORDER — BUPROPION HYDROCHLORIDE 150 MG/1
150 TABLET ORAL EVERY MORNING
Qty: 60 TAB | Refills: 2 | Status: SHIPPED | OUTPATIENT
Start: 2020-05-26 | End: 2020-08-19 | Stop reason: SDUPTHER

## 2020-05-26 ASSESSMENT — FIBROSIS 4 INDEX: FIB4 SCORE: 0.77

## 2020-05-26 NOTE — PROGRESS NOTES
"                                  [facility] PSYCHIATRIC FOLLOW-UP NOTE    This provider informed the patient their medical records are totally confidential except for the use by other providers involved in their care, or if the patient signs a release, or to report instances of child or elder abuse, or if it is determined they are an immediate risk to harm themselves or others.  To avoid spread of covid-19 virus this appointment was conducted by telehealth.  The patient gave consent to have  this follow up session by video telehealth.    Time at beginning of call:  10:30 AM     TOTAL FACE-TO-FACE TIME  30 minutes    CHIEF COMPLAINT       Having mild depression, panic attacks, and traumatic recollections.  HISTORY OF PRESENT ILLNESS       The patient is a  50yo W male who has been disabled by 2 strokes in 2016 and 2018 who was evaluated by this provider on 03/23/2020 for recurrent Major Depression, Panic Disorder with Agoraphobia, and Posttraumatic Stress Disorder.  He continues to have difficulty with dysarthria, word finding, recent memory and weakness in his (L) hand.  His episodes of depression are characterized by anhedonia, decreased sleep and appetite, markedly decreased energy, concentration, interest, and motivation, feeling worthless and hopeless, and having passive suicidal ideation that \"he would be better off dead\".  He denies ever having an overt suicidal intent, plan, impulse, or attempt.       He also has had Panic Disorder with Agoraphobia since his strokes.  His panic attacks are triggered when he goes out in public.  He has some symptoms consistent with PTSD except he has no evidence of Criterion A or a near death or serious injury experience.  PSYCHOSOCIAL CHANGES SINCE PREVIOUS CONTACT       The patient and his wife are socially isolating because of his history of chronic bronchitis and the current covid-19 pandemic.  RESPONSE TO TREATMENT       Having alleviation of " depression by fluoxetine and bupropion XL.  He has better suppression of nightmares by prazosin 4mg po QHS.   He also has alleviation of anxiety and panic attacks by buspirone 30mg po BID and clonazepam 0.5mg po BID as needed.  PAST PSYCHIATRIC MEDICATIONS       Fluoxetine, buspirone, clonazepam, prazosin.  MEDICATION SIDE EFFECTS       He has a dry mouth and uses altoids to stimulate saliva in his mouth.    MEDICAL REVIEW OF SYSTEMS:   Constitutional positive - obesity   Eyes negative   Ears/Nose/Mouth/Throat negative   Cardiovascular positive - hyprtension   Respiratory positive - COPD, obstructive sleep apnea and using CPAP   Gastrointestinal positive - GERD, fatty liver,  diverticulitis   Genitourinary negative   Muscular negative   Integumentary negative   Neurological positive - CVA in 2016 and 2018   Endocrine positive - hyperlipidemia   Hematologic/Lymphatic positive - iron deficiency anemia       MENTAL STATUS EVALUATION  There were no vitals taken for this visit.  Participation: Active verbal participation  Grooming:Casual  Orientation: Fully Oriented  Eye contact: Limited  Behavior:Agitated   Mood: Euthymic  Affect: Full range  Thought process: Logical  Thought content:  Within normal limits  Speech: Rate within normal limits and Volume within normal limits  Perception:  Within normal limits  Memory:  No gross evidence of memory deficits  Insight: Adequate  Judgment: Adequate  Family/couple interaction observations:   Other:    Current risk:    Suicide: Low   Homicide: Not applicable   Self-harm: Low  Relapse: Moderate  Other:   Crisis Safety Plan reviewed?No  If evidence of imminent risk is present, intervention/plan:    Medical Records/Labs/Diagnostic Tests Reviewed: no    Medical Records/Labs/Diagnostic Tests Ordered: no    DIAGNOSTIC IMPRESSIONS       (1) Major Depression, Recurrent, Moderate (F33.1)       (2) Panic Disorder with Agoraphobia (F40.01)       (3) Posttraumatic Stress Disorder (F43.10)        (4) Cannabis Dependence (F12.20)    ASSESSMENT AND PLAN       Increase bupropion XL dosage to 300mg po QAM.       Continue fluoxetine 80mg po QAM.       Continue buspirone 30mg po BID.       Continue clonazepam 0.5mg po BID as needed for panic symptoms.       Continue outpatient psychotherapy with Robert Tapia, PhD in this clinic.       RTC to  Clinic in 3 months for 30 min follow up with this provider.    Time at end of call:  11:00 AM    30 minutes spent in psychotherapy  Topics addressed in psychotherapy include:  Discussed how his dysfunctional family and schizophrenic stepmother were related to his chronic feelings of inadequacy.  Discussed how to use therapeutic breathing to reduce anxiety.

## 2020-05-28 ENCOUNTER — HOSPITAL ENCOUNTER (OUTPATIENT)
Dept: RADIOLOGY | Facility: MEDICAL CENTER | Age: 50
End: 2020-05-28
Attending: INTERNAL MEDICINE
Payer: COMMERCIAL

## 2020-05-28 DIAGNOSIS — R93.89 ABNORMAL CHEST X-RAY: ICD-10-CM

## 2020-05-28 PROCEDURE — 71046 X-RAY EXAM CHEST 2 VIEWS: CPT

## 2020-06-02 ENCOUNTER — TELEMEDICINE (OUTPATIENT)
Dept: BEHAVIORAL HEALTH | Facility: CLINIC | Age: 50
End: 2020-06-02
Payer: COMMERCIAL

## 2020-06-02 DIAGNOSIS — F43.10 PTSD (POST-TRAUMATIC STRESS DISORDER): ICD-10-CM

## 2020-06-02 DIAGNOSIS — F33.0 MILD EPISODE OF RECURRENT MAJOR DEPRESSIVE DISORDER (HCC): ICD-10-CM

## 2020-06-02 DIAGNOSIS — F40.01 PANIC DISORDER WITH AGORAPHOBIA: ICD-10-CM

## 2020-06-02 PROCEDURE — 90834 PSYTX W PT 45 MINUTES: CPT | Mod: 95,CR | Performed by: PSYCHOLOGIST

## 2020-06-02 NOTE — BH THERAPY
[facility]  Therapy Progress Note    Patient Name: Luciano Silverman  Patient MRN: [id number]  Today's Date: 6/2/2020     Type of session:Individual psychotherapy  Length of session: 45 minutes  Persons in attendance:Patient     This encounter was conducted via Zoom.   Verbal consent was obtained. Patient's identity was verified.    Subjective/New Info:   Luciano Silverman is a 48 year old white male referred for assessment by BENIGNO Tran (nurse practitioner) for the treatment of anxiety and depression. Patient is feeling quite overwhelmed, as he continues to isolate at home and deal with several medical issues. He is also quite anxious about a hearing with social security disability to determine his eligibility. Patient expressed some anxiety about the violence as a result of the police incident. Talked with patient about the importance of focusing on the several things in his life that are fairly positive. Discussed some things that he might do to increase some activities that will improve his quality of life. Patient has been attempting to get out of the house at least a few times a week and stated that he takes necessary precautions. Patient does seem a bit less depressed, although he remains quite anxious. He does enjoy spending time with his four dogs and his cat, which he finds very therapeutic. Discussed the importance of addressing his cigarette use, and the possible medical consequences. He stated that he is only smoking one or two per day. Patient also stated that he has lost about twenty-five pounds in the past year.      Patient did not present in acute distress. Patient was appropriately groomed and cooperative. Patient was alert and oriented to person, place, and time. Eye contact was appropriate. No abnormalities in attention or concentration were noted. No abnormalities of movement present; psychomotor activity was normal. Speech was fluent and regular in rhythm, rate, volume, and  tone. Thought processes were linear, logical, and goal-directed. There was no evidence of thought disorder. No auditory or visual hallucinations. Long and short term memory appeared to be intact. Insight, judgment, and impulse control were deemed to be within normal limits. Reported mood was fairly negative. Affect was appropriate and congruent with thought content and conversation. Patient denied current suicidal and homicidal ideation in plan, intent, and preparatory behavior.     Objective/Observations:              Participation: Active verbal participation, Attentive, Engaged and Open to feedback              Grooming: Casual and Neat              Cognition: Alert and Fully Oriented              Eye contact: Good              Mood: Depressed and Anxious              Affect: Flexible              Thought process: Logical and Goal-directed              Speech: Rate within normal limits and Volume within normal limits              Other:         Diagnoses:   1. Panic disorder with agoraphobia    2. Mild episode of recurrent major depressive disorder (HCC)    3. PTSD (post-traumatic stress disorder)         Current risk:   SUICIDE: Low   Homicide: Low   Self-harm: Low   Relapse: Not applicable   Other:    Safety Plan reviewed? No   If evidence of imminent risk is present, intervention/plan:     Therapeutic Intervention(s): Cognitive modification, Distress tolerance skills, Leisure and recreation skills, Socialization, Stressors assessed and Supportive psychotherapy    Treatment Goal(s)/Objective(s) addressed:   Decrease symptoms of depression  Objective A: Patient will increase activity level by will participating in at least two hours, three times per week in a social or leisure activity with family member or close friend.  Objective B: Patient will express an increase in positive statements about self by will making at least five positive statements per day about self-circumstances.  Objective C: Patient will  spend more time with friend in social situations by spending at least thirty minutes four times per week in a social situation, interacting appropriately with a friend.     Reduce Symptoms of Anxiety:  Objective A: Patient will learn one effective technique for dealing with anxiety each week, and utilize it effectively when feeling anxious.  Objective B: Patient will express an increase in positive statements by making at least five positive statements per day about self or current circumstances.  Objective C: Patient will increase activity level by participating in at least two hours, three times per week in a leisure or social activity.           Progress toward Treatment Goals: Mild improvement    Plan:  - Continue Individual therapy    Robert Tapia, Ph.D.  6/2/2020

## 2020-07-14 ENCOUNTER — TELEMEDICINE (OUTPATIENT)
Dept: BEHAVIORAL HEALTH | Facility: CLINIC | Age: 50
End: 2020-07-14
Payer: COMMERCIAL

## 2020-07-14 DIAGNOSIS — F33.0 MILD EPISODE OF RECURRENT MAJOR DEPRESSIVE DISORDER (HCC): ICD-10-CM

## 2020-07-14 DIAGNOSIS — F40.01 PANIC DISORDER WITH AGORAPHOBIA: ICD-10-CM

## 2020-07-14 DIAGNOSIS — F43.10 PTSD (POST-TRAUMATIC STRESS DISORDER): ICD-10-CM

## 2020-07-14 PROCEDURE — 90834 PSYTX W PT 45 MINUTES: CPT | Mod: 95,CR | Performed by: PSYCHOLOGIST

## 2020-07-14 NOTE — BH THERAPY
[facility]  Therapy Progress Note    Patient Name: Luciano Silverman  Patient MRN: [id number]  Today's Date: 7/14/2020     Type of session:Individual psychotherapy  Length of session: 45 minutes  Persons in attendance:Patient     This encounter was conducted via Zoom.   Verbal consent was obtained. Patient's identity was verified.    Subjective/New Info:   Luciano Silverman is a 48 year old white male referred for assessment by BENIGNO Tran (nurse practitioner) for the treatment of anxiety and depression. Patient is doing fairly well. He had his social security hearing a few weeks ago and he thinks that it went quite positive. He also had a prostate biopsy two weeks ago and is a bit anxious about waiting for the results. Patient continues to isolate himself at home because of the virus. Talked with patient about the importance of remaining focused on the several positive things in his life. Patient expressed some concern about the violence and the potential violence that [place] are [place] in the country. He does respond well to reassurance.      Patient did not present in acute distress. Patient was appropriately groomed and cooperative. Patient was alert and oriented to person, place, and time. Eye contact was appropriate. No abnormalities in attention or concentration were noted. No abnormalities of movement present; psychomotor activity was normal. Speech was fluent and regular in rhythm, rate, volume, and tone. Thought processes were linear, logical, and goal-directed. There was no evidence of thought disorder. No auditory or visual hallucinations. Long and short term memory appeared to be intact. Insight, judgment, and impulse control were deemed to be within normal limits. Reported mood was fairly negative. Affect was appropriate and congruent with thought content and conversation. Patient denied current suicidal and homicidal ideation in plan, intent, and preparatory  behavior.     Objective/Observations:              Participation: Active verbal participation, Attentive, Engaged and Open to feedback              Grooming: Casual and Neat              Cognition: Alert and Fully Oriented              Eye contact: Good              Mood: Depressed and Anxious              Affect: Flexible              Thought process: Logical and Goal-directed              Speech: Rate within normal limits and Volume within normal limits              Other:      Diagnoses:   1. Panic disorder with agoraphobia    2. Mild episode of recurrent major depressive disorder (HCC)    3. PTSD (post-traumatic stress disorder)         Current risk:   SUICIDE: Low   Homicide: Low   Self-harm: Low   Relapse: Not applicable   Other:    Safety Plan reviewed? Not Indicated   If evidence of imminent risk is present, intervention/plan:     Therapeutic Intervention(s): Cognitive modification, Distress tolerance skills, Leisure and recreation skills, Stressors assessed and Supportive psychotherapy    Treatment Goal(s)/Objective(s) addressed:   Decrease symptoms of depression  Objective A: Patient will increase activity level by will participating in at least two hours, three times per week in a social or leisure activity with family member or close friend.  Objective B: Patient will express an increase in positive statements about self by will making at least five positive statements per day about self-circumstances.  Objective C: Patient will spend more time with friend in social situations by spending at least thirty minutes four times per week in a social situation, interacting appropriately with a friend.     Reduce Symptoms of Anxiety:  Objective A: Patient will learn one effective technique for dealing with anxiety each week, and utilize it effectively when feeling anxious.  Objective B: Patient will express an increase in positive statements by making at least five positive statements per day about self or  current circumstances.  Objective C: Patient will increase activity level by participating in at least two hours, three times per week in a leisure or social activity.       Progress toward Treatment Goals: Mild improvement    Plan:  - Continue Individual therapy    Robert Tapia, Ph.D.  7/14/2020

## 2020-07-17 DIAGNOSIS — J30.2 SEASONAL ALLERGIES: ICD-10-CM

## 2020-07-17 RX ORDER — BUSPIRONE HYDROCHLORIDE 30 MG/1
30 TABLET ORAL 2 TIMES DAILY
Qty: 90 TAB | Refills: 1 | Status: SHIPPED | OUTPATIENT
Start: 2020-07-17 | End: 2020-08-19 | Stop reason: SDUPTHER

## 2020-07-20 RX ORDER — FLUTICASONE PROPIONATE 50 MCG
SPRAY, SUSPENSION (ML) NASAL
Qty: 48 ML | Refills: 2 | Status: ON HOLD | OUTPATIENT
Start: 2020-07-20 | End: 2022-04-06

## 2020-08-19 ENCOUNTER — TELEMEDICINE (OUTPATIENT)
Dept: BEHAVIORAL HEALTH | Facility: CLINIC | Age: 50
End: 2020-08-19
Payer: COMMERCIAL

## 2020-08-19 VITALS — BODY MASS INDEX: 30.51 KG/M2 | HEIGHT: 69 IN | WEIGHT: 206 LBS

## 2020-08-19 DIAGNOSIS — F40.01 PANIC DISORDER WITH AGORAPHOBIA AND MILD PANIC ATTACKS: ICD-10-CM

## 2020-08-19 DIAGNOSIS — F33.0 MILD EPISODE OF RECURRENT MAJOR DEPRESSIVE DISORDER (HCC): ICD-10-CM

## 2020-08-19 DIAGNOSIS — F40.01 PANIC DISORDER WITH AGORAPHOBIA: ICD-10-CM

## 2020-08-19 DIAGNOSIS — F43.10 POSTTRAUMATIC STRESS DISORDER: ICD-10-CM

## 2020-08-19 PROCEDURE — 99213 OFFICE O/P EST LOW 20 MIN: CPT | Mod: 95,CR | Performed by: PSYCHIATRY & NEUROLOGY

## 2020-08-19 PROCEDURE — 90833 PSYTX W PT W E/M 30 MIN: CPT | Mod: 95,CR | Performed by: PSYCHIATRY & NEUROLOGY

## 2020-08-19 RX ORDER — FLUOXETINE HYDROCHLORIDE 40 MG/1
80 CAPSULE ORAL
Qty: 60 CAP | Refills: 2 | Status: SHIPPED | OUTPATIENT
Start: 2020-08-19 | End: 2020-10-14 | Stop reason: SDUPTHER

## 2020-08-19 RX ORDER — BUSPIRONE HYDROCHLORIDE 30 MG/1
30 TABLET ORAL 2 TIMES DAILY
Qty: 90 TAB | Refills: 1 | Status: SHIPPED | OUTPATIENT
Start: 2020-08-19 | End: 2020-10-14 | Stop reason: SDUPTHER

## 2020-08-19 RX ORDER — FERROUS SULFATE 325(65) MG
TABLET ORAL
Qty: 180 TAB | Refills: 2 | Status: SHIPPED | OUTPATIENT
Start: 2020-08-19 | End: 2021-04-14

## 2020-08-19 RX ORDER — BUPROPION HYDROCHLORIDE 150 MG/1
150 TABLET ORAL EVERY MORNING
Qty: 60 TAB | Refills: 2 | Status: SHIPPED | OUTPATIENT
Start: 2020-08-19 | End: 2020-10-14

## 2020-08-19 RX ORDER — CLONAZEPAM 0.5 MG/1
0.5 TABLET ORAL 2 TIMES DAILY PRN
Qty: 60 TAB | Refills: 2 | Status: SHIPPED | OUTPATIENT
Start: 2020-08-19 | End: 2020-10-14 | Stop reason: SDUPTHER

## 2020-08-19 ASSESSMENT — FIBROSIS 4 INDEX: FIB4 SCORE: 0.79

## 2020-08-19 NOTE — PROGRESS NOTES
[facility] PSYCHI[facility] FOLLOW-UP NOTE    This provider informed the patient their medical records are totally confidential except for the use by other providers involved in their care, or if the patient signs a release, or to report instances of child or elder abuse, or if it is determined they are an immediate risk to harm themselves or others.  To avoid spread of covid-19 virus this appointment was conducted by telehealth.  The patient have consent to have this follow up session by video telehealth.    Time at beginning of call:  02:30 PM    TOTAL FACE-TO-FACE TIME  30 minutes    CHIEF COMPLAINT       Having depressed mood, panic attyacks, and traumatic recollections.    HISTORY OF PRESENT ILLNESS       The patient is a  51yo W male who was disabled by 2 strokes in 2016 and 2018 and hjas dysarthria, difficulty with word finding, recent memory, and weakness in his (L) hand.  He is followed buy this provider for recurrent Major Depression, Panic Disorder with Agoraphobia, and Posttraumatic Stress Disorder.  His depressive episodes have a typical pattern of anhedonia, decreased sleep and appetite, feeling worthless and hopeless, and having passive suicidal ideation.  He denies ever having an overt suicidal plan, intent, or attempt.       He has had Panic Disorder with Agoraphobia since his strokes and has panic attacks triggered whenever he goes out in public.  He has some symptoms of PTSD but he has no evidence of Criterion A.       The patient is frustrated by his incomplete recovery from his strokes.  He recently saw a urologist because of a high PSA and his biopsy was positive for prostate cancer and he has chosen to do seed therapy.  His great uncle had the seed therapy for prostate cancer and  12 years later in his 80's.  At first his anxiety was exacerbated and was quite agitated.  Now he feels he has a good chance of a cure.  PSYCHOSOCIAL CHANGES  SINCE PREVIOUS CONTACT       The patient is still walking with his cane and has some difficulty with articulating his speech when he gets stressed out.  RESPONSE TO TREATMENT       He has good prevention of panic attacks by fluoxetine 80mg po QAM.  PAST PSYCHIATRIC MEDICATIONS       Fluoxetine, buspirone, clonazepam, prazopsin, bupropion XL.  MEDICATION SIDE EFFECTS       none    MEDICAL REVIEW OF SYSTEMS:   Constitutional positive - obesity   Eyes negative   Ears/Nose/Mouth/Throat negative   Cardiovascular positive - hyprtension   Respiratory positive - COPD, obstructive sleep apnea and using CPAP   Gastrointestinal positive - GERD, fatty liver,  diverticulitis   Genitourinary negative   Muscular negative   Integumentary negative   Neurological positive - CVA in 2016 and 2018   Endocrine positive - hyperlipidemia   Hematologic/Lymphatic positive - iron deficiency anemia        MENTAL STATUS EVALUATION  There were no vitals taken for this visit.   Participation: Active verbal participation  Grooming:Neat  Orientation: Fully Oriented  Eye contact: Good  Behavior:Calm   Mood: Euthymic  Affect: Full range  Thought process: Logical  Thought content:  Within normal limits  Speech: Rate within normal limits  Perception:  Within normal limits  Memory:  No gross evidence of memory deficits  Insight: Good  Judgment: Good  Family/couple interaction observations:   Other:  Depression Screen (PHQ-2/PHQ-9) 3/2/2021 3/29/2021 4/19/2021   PHQ-2 Total Score 0 - -   PHQ-2 Total Score - 4 5   PHQ-9 Total Score - - -   PHQ-9 Total Score - 19 16       Interpretation of PHQ-9 Total Score   Score Severity   1-4 No Depression   5-9 Mild Depression   10-14 Moderate Depression   15-19 Moderately Severe Depression   20-27 Severe Depression  Current risk:     Suicide: Low   Homicide: Not applicable   Self-harm: Low  Relapse: Moderate  Other:   Crisis Safety Plan reviewed?No  If evidence of imminent risk is present,  intervention/plan:    Medical Records/Labs/Diagnostic Tests Reviewed: yes    Medical Records/Labs/Diagnostic Tests Ordered: no    DIAGNOSTIC IMPRESSIONS       (1) Major Depression, Recurrent, Moderate (FG33.1)       (2) Panic Disorder with Agoraphobia (43.10)       (3) Posttraumatic Stress Disorder (F43.10)_       (4) Cannabis Dependence (F12.20)           ASSESSMENT AND PLAN       Stable on current medications and dosages.       Continue bupropion XL dosaqe to 300mg po QAM.       Continue fluoxetine 80mg po QAM.        Continue buspirone 30mg po BID.       Continue clonazepam 0.5mg po BID as needed for panic symptoms.       RTC to  Clinic in 2 months for 30 min follow up with this provider.     Time at end of call:  03:00 PM    30 minutes spent in psychotherapy  Topics addressed in psychotherapy include:  Recommended therapeutic breathing to reduce panic symptoms.  Explored the patient's interpersonal relationships and support system.  Recommended practicing eye-hand coordination on internet computer games.

## 2020-08-20 RX ORDER — PRAZOSIN HYDROCHLORIDE 2 MG/1
CAPSULE ORAL
Qty: 180 CAP | Refills: 0 | Status: SHIPPED | OUTPATIENT
Start: 2020-08-20 | End: 2020-10-14 | Stop reason: SDUPTHER

## 2020-08-25 ENCOUNTER — TELEMEDICINE (OUTPATIENT)
Dept: BEHAVIORAL HEALTH | Facility: CLINIC | Age: 50
End: 2020-08-25
Payer: COMMERCIAL

## 2020-08-25 DIAGNOSIS — F33.0 MILD EPISODE OF RECURRENT MAJOR DEPRESSIVE DISORDER (HCC): ICD-10-CM

## 2020-08-25 DIAGNOSIS — F43.10 POSTTRAUMATIC STRESS DISORDER: ICD-10-CM

## 2020-08-25 DIAGNOSIS — F40.01 PANIC DISORDER WITH AGORAPHOBIA AND MILD PANIC ATTACKS: ICD-10-CM

## 2020-08-25 PROCEDURE — 90834 PSYTX W PT 45 MINUTES: CPT | Mod: 95,CR | Performed by: PSYCHOLOGIST

## 2020-08-25 NOTE — BH THERAPY
[facility]  Therapy Progress Note    Patient Name: Luciano Silverman  Patient MRN: [id number]  Today's Date: 8/25/2020     Type of session:Individual psychotherapy  Length of session: 45 minutes  Persons in attendance:Patient     This evaluation was conducted via Zoom using secure and encrypted videoconferencing technology. (Virtual) Verbal consent was obtained. Patient's identity was verified.    Subjective/New Info:   Luciano Silverman is a 48 year old white male referred for assessment by BENIGNO Tran (nurse practitioner) for the treatment of anxiety and depression. Patient is doing okay. He was diagnosed with prostate cancer and is going to have the radioactive seeds placed in his prostate. He is quite anxious about the procedure, he does remain optimistic. He continues to await the results of his disability hearing. Patient's level of anxiety has been a bit worse due to his prostate cancer, the coronavirus, his chronic pain, and the limited income. Patient remains cautious but has been trying to get out with his wife a few times a week. He does continue to avoid crowds stating that he does get very anxious around people and public places. Encouraged patient to increase his exercise, especially going for a walk a few times a week. In general patient appears to be doing fairly well.         Patient did not present in acute distress. Patient was appropriately groomed and cooperative. Patient was alert and oriented to person, place, and time. Eye contact was appropriate. No abnormalities in attention or concentration were noted. No abnormalities of movement present; psychomotor activity was normal. Speech was fluent and regular in rhythm, rate, volume, and tone. Thought processes were linear, logical, and goal-directed. There was no evidence of thought disorder. No auditory or visual hallucinations. Long and short term memory appeared to be intact. Insight, judgment, and impulse control were deemed  to be within normal limits. Reported mood was fairly negative. Affect was appropriate and congruent with thought content and conversation. Patient denied current suicidal and homicidal ideation in plan, intent, and preparatory behavior.     Objective/Observations:              Participation: Active verbal participation, Attentive, Engaged and Open to feedback              Grooming: Casual and Neat              Cognition: Alert and Fully Oriented              Eye contact: Good              Mood: Depressed and Anxious              Affect: Flexible              Thought process: Logical and Goal-directed              Speech: Rate within normal limits and Volume within normal limits              Other:      Diagnoses:   1. Panic disorder with agoraphobia and mild panic attacks    2. Mild episode of recurrent major depressive disorder (HCC)    3. PTSD (post-traumatic stress disorder)         Current risk:   SUICIDE: Low   Homicide: Low   Self-harm: Low   Relapse: Not applicable   Other:    Safety Plan reviewed? Not Indicated   If evidence of imminent risk is present, intervention/plan:     Therapeutic Intervention(s): Cognitive modification, Distress tolerance skills, Leisure and recreation skills, Stressors assessed and Supportive psychotherapy    Treatment Goal(s)/Objective(s) addressed:   Decrease symptoms of depression  Objective A: Patient will increase activity level by will participating in at least two hours, three times per week in a social or leisure activity with family member or close friend.  Objective B: Patient will express an increase in positive statements about self by will making at least five positive statements per day about self-circumstances.  Objective C: Patient will spend more time with friend in social situations by spending at least thirty minutes four times per week in a social situation, interacting appropriately with a friend.     Reduce Symptoms of Anxiety:  Objective A: Patient will learn  one effective technique for dealing with anxiety each week, and utilize it effectively when feeling anxious.  Objective B: Patient will express an increase in positive statements by making at least five positive statements per day about self or current circumstances.  Objective C: Patient will increase activity level by participating in at least two hours, three times per week in a leisure or social activity.      Progress toward Treatment Goals: Mild improvement    Plan:  - Continue Individual therapy    Robert Tapia, Ph.D.  8/25/2020

## 2020-09-03 ENCOUNTER — HOSPITAL ENCOUNTER (OUTPATIENT)
Dept: LAB | Facility: MEDICAL CENTER | Age: 50
End: 2020-09-03
Attending: ANESTHESIOLOGY
Payer: COMMERCIAL

## 2020-09-03 DIAGNOSIS — I10 ESSENTIAL HYPERTENSION: ICD-10-CM

## 2020-09-03 LAB
ANION GAP SERPL CALC-SCNC: 12 MMOL/L (ref 7–16)
BASOPHILS # BLD AUTO: 0.5 % (ref 0–1.8)
BASOPHILS # BLD: 0.03 K/UL (ref 0–0.12)
BUN SERPL-MCNC: 20 MG/DL (ref 8–22)
CALCIUM SERPL-MCNC: 9.5 MG/DL (ref 8.5–10.5)
CHLORIDE SERPL-SCNC: 99 MMOL/L (ref 96–112)
CO2 SERPL-SCNC: 25 MMOL/L (ref 20–33)
CREAT SERPL-MCNC: 1.06 MG/DL (ref 0.5–1.4)
EOSINOPHIL # BLD AUTO: 0.15 K/UL (ref 0–0.51)
EOSINOPHIL NFR BLD: 2.6 % (ref 0–6.9)
ERYTHROCYTE [DISTWIDTH] IN BLOOD BY AUTOMATED COUNT: 47.7 FL (ref 35.9–50)
GLUCOSE SERPL-MCNC: 101 MG/DL (ref 65–99)
HCT VFR BLD AUTO: 41.4 % (ref 42–52)
HGB BLD-MCNC: 13.6 G/DL (ref 14–18)
IMM GRANULOCYTES # BLD AUTO: 0.05 K/UL (ref 0–0.11)
IMM GRANULOCYTES NFR BLD AUTO: 0.9 % (ref 0–0.9)
LYMPHOCYTES # BLD AUTO: 1.13 K/UL (ref 1–4.8)
LYMPHOCYTES NFR BLD: 19.4 % (ref 22–41)
MCH RBC QN AUTO: 31.3 PG (ref 27–33)
MCHC RBC AUTO-ENTMCNC: 32.9 G/DL (ref 33.7–35.3)
MCV RBC AUTO: 95.4 FL (ref 81.4–97.8)
MONOCYTES # BLD AUTO: 0.64 K/UL (ref 0–0.85)
MONOCYTES NFR BLD AUTO: 11 % (ref 0–13.4)
NEUTROPHILS # BLD AUTO: 3.82 K/UL (ref 1.82–7.42)
NEUTROPHILS NFR BLD: 65.6 % (ref 44–72)
NRBC # BLD AUTO: 0 K/UL
NRBC BLD-RTO: 0 /100 WBC
PLATELET # BLD AUTO: 211 K/UL (ref 164–446)
PMV BLD AUTO: 10.9 FL (ref 9–12.9)
POTASSIUM SERPL-SCNC: 4.4 MMOL/L (ref 3.6–5.5)
RBC # BLD AUTO: 4.34 M/UL (ref 4.7–6.1)
SODIUM SERPL-SCNC: 136 MMOL/L (ref 135–145)
WBC # BLD AUTO: 5.8 K/UL (ref 4.8–10.8)

## 2020-09-03 PROCEDURE — 85025 COMPLETE CBC W/AUTO DIFF WBC: CPT

## 2020-09-03 PROCEDURE — 36415 COLL VENOUS BLD VENIPUNCTURE: CPT

## 2020-09-03 PROCEDURE — 80048 BASIC METABOLIC PNL TOTAL CA: CPT

## 2020-09-04 NOTE — TELEPHONE ENCOUNTER
Received request via: Pharmacy    Was the patient seen in the last year in this department? Yes    Does the patient have an active prescription (recently filled or refills available) for medication(s) requested? No     Requested Prescriptions     Pending Prescriptions Disp Refills   • hydrochlorothiazide (MICROZIDE) 12.5 MG capsule [Pharmacy Med Name: HYDROCHLOROTHIAZIDE 12.5 MG CP] 30 Cap 3     Sig: TAKE 1 CAPSULE BY MOUTH EVERY DAY

## 2020-09-07 RX ORDER — HYDROCHLOROTHIAZIDE 12.5 MG/1
CAPSULE, GELATIN COATED ORAL
Qty: 30 CAP | Refills: 3 | Status: SHIPPED | OUTPATIENT
Start: 2020-09-07 | End: 2021-01-12

## 2020-09-29 ENCOUNTER — TELEMEDICINE (OUTPATIENT)
Dept: BEHAVIORAL HEALTH | Facility: CLINIC | Age: 50
End: 2020-09-29
Payer: COMMERCIAL

## 2020-09-29 DIAGNOSIS — F40.01 PANIC DISORDER WITH AGORAPHOBIA AND MILD PANIC ATTACKS: ICD-10-CM

## 2020-09-29 DIAGNOSIS — F43.10 POSTTRAUMATIC STRESS DISORDER: ICD-10-CM

## 2020-09-29 DIAGNOSIS — F33.0 MILD EPISODE OF RECURRENT MAJOR DEPRESSIVE DISORDER (HCC): ICD-10-CM

## 2020-09-29 PROCEDURE — 90834 PSYTX W PT 45 MINUTES: CPT | Mod: 95,CR | Performed by: PSYCHOLOGIST

## 2020-09-29 SDOH — SOCIAL STABILITY: SOCIAL NETWORK: ARE YOU MARRIED, WIDOWED, DIVORCED, SEPARATED, NEVER MARRIED, OR LIVING WITH A PARTNER?: MARRIED

## 2020-09-29 SDOH — SOCIAL STABILITY: SOCIAL NETWORK: HOW OFTEN DO YOU ATTENT MEETINGS OF THE CLUB OR ORGANIZATION YOU BELONG TO?: NEVER

## 2020-09-29 SDOH — SOCIAL STABILITY: SOCIAL NETWORK: HOW OFTEN DO YOU GET TOGETHER WITH FRIENDS OR RELATIVES?: NEVER

## 2020-09-29 SDOH — SOCIAL STABILITY: SOCIAL NETWORK: IN A TYPICAL WEEK, HOW MANY TIMES DO YOU TALK ON THE PHONE WITH FAMILY, FRIENDS, OR NEIGHBORS?: ONCE A WEEK

## 2020-09-29 SDOH — ECONOMIC STABILITY: FOOD INSECURITY: WITHIN THE PAST 12 MONTHS, YOU WORRIED THAT YOUR FOOD WOULD RUN OUT BEFORE YOU GOT MONEY TO BUY MORE.: SOMETIMES TRUE

## 2020-09-29 SDOH — ECONOMIC STABILITY: FOOD INSECURITY: WITHIN THE PAST 12 MONTHS, THE FOOD YOU BOUGHT JUST DIDN'T LAST AND YOU DIDN'T HAVE MONEY TO GET MORE.: SOMETIMES TRUE

## 2020-09-29 SDOH — SOCIAL STABILITY: SOCIAL NETWORK
DO YOU BELONG TO ANY CLUBS OR ORGANIZATIONS SUCH AS CHURCH GROUPS UNIONS, FRATERNAL OR ATHLETIC GROUPS, OR SCHOOL GROUPS?: NO

## 2020-09-29 SDOH — SOCIAL STABILITY: SOCIAL NETWORK: HOW OFTEN DO YOU ATTEND CHURCH OR RELIGIOUS SERVICES?: NEVER

## 2020-09-29 SDOH — HEALTH STABILITY: MENTAL HEALTH
STRESS IS WHEN SOMEONE FEELS TENSE, NERVOUS, ANXIOUS, OR CAN'T SLEEP AT NIGHT BECAUSE THEIR MIND IS TROUBLED. HOW STRESSED ARE YOU?: VERY MUCH

## 2020-09-29 SDOH — ECONOMIC STABILITY: TRANSPORTATION INSECURITY
IN THE PAST 12 MONTHS, HAS THE LACK OF TRANSPORTATION KEPT YOU FROM MEDICAL APPOINTMENTS OR FROM GETTING MEDICATIONS?: NO

## 2020-09-29 SDOH — HEALTH STABILITY: PHYSICAL HEALTH: ON AVERAGE, HOW MANY DAYS PER WEEK DO YOU ENGAGE IN MODERATE TO STRENUOUS EXERCISE (LIKE A BRISK WALK)?: 0 DAYS

## 2020-09-29 SDOH — HEALTH STABILITY: MENTAL HEALTH: HOW MANY STANDARD DRINKS CONTAINING ALCOHOL DO YOU HAVE ON A TYPICAL DAY?: 1 OR 2

## 2020-09-29 SDOH — ECONOMIC STABILITY: INCOME INSECURITY: HOW HARD IS IT FOR YOU TO PAY FOR THE VERY BASICS LIKE FOOD, HOUSING, MEDICAL CARE, AND HEATING?: VERY HARD

## 2020-09-29 SDOH — ECONOMIC STABILITY: TRANSPORTATION INSECURITY
IN THE PAST 12 MONTHS, HAS LACK OF TRANSPORTATION KEPT YOU FROM MEETINGS, WORK, OR FROM GETTING THINGS NEEDED FOR DAILY LIVING?: NO

## 2020-09-29 SDOH — HEALTH STABILITY: PHYSICAL HEALTH: ON AVERAGE, HOW MANY MINUTES DO YOU ENGAGE IN EXERCISE AT THIS LEVEL?: 0 MIN

## 2020-09-29 NOTE — BH THERAPY
[facility]  Therapy Progress Note    Patient Name: Luciano Silverman  Patient MRN: [id number]  Today's Date: 9/29/2020     Type of session:Individual psychotherapy  Length of session: 45 minutes  Persons in attendance:Patient     This evaluation was conducted via Zoom using secure and encrypted videoconferencing (virtual) technology. The patient was in a private location in the [facility]. Verbal consent was obtained. Patient's identity was verified.    Subjective/New Info:   Luciano Silverman is a 48 year old white male referred for assessment by BENIGNO Tran (nurse practitioner) for the treatment of anxiety and depression. Patient is feeling quite anxious about his prostate surgery. He is also frustrated about his social security disability denial. He reported that he is not sleeping well and feels very miserable because of the pain from his prostate procedure. Patient continues to express anxiety about leaving the house which has been increased due to the coronavirus. Encouraged patient to increase his activity level, even if he just increases his activity in the house. Discussed some things that he might do which will increase his motivation. He is concerned that he might not be able to get an erection. He does appear a bit more depressed and overwhelmed. Patient is very angry about the things going on in the country, including the coronavirus, the civil unrest, political divide, and the fires in [place]. Encouraged patient to keep his exposure to the news at a minimum. Also talked with patient about the importance of discontinuing his cigarette consumption.          Patient did not present in acute distress. Patient was appropriately groomed and cooperative. Patient was alert and oriented to person, place, and time. Eye contact was appropriate. No abnormalities in attention or concentration were noted. No abnormalities of movement present; psychomotor activity was normal. Speech was  fluent and regular in rhythm, rate, volume, and tone. Thought processes were linear, logical, and goal-directed. There was no evidence of thought disorder. No auditory or visual hallucinations. Long and short term memory appeared to be intact. Insight, judgment, and impulse control were deemed to be within normal limits. Reported mood was fairly negative and frustrated. He also appears a bit more depressed this visit. Affect was appropriate and congruent with thought content and conversation. Patient denied current suicidal and homicidal ideation in plan, intent, and preparatory behavior.     Objective/Observations:              Participation: Active verbal participation, Attentive, Engaged and Open to feedback              Grooming: Casual and Neat              Cognition: Alert and Fully Oriented              Eye contact: Good              Mood: Depressed and Anxious              Affect: Flexible              Thought process: Logical and Goal-directed              Speech: Rate within normal limits and Volume within normal limits              Other:        Diagnoses:   1. Panic disorder with agoraphobia and mild panic attacks    2. Mild episode of recurrent major depressive disorder (HCC)    3. PTSD (post-traumatic stress disorder)         Current risk:   SUICIDE: Low   Homicide: Low   Self-harm: Low   Relapse: Not applicable   Other:    Safety Plan reviewed? Not Indicated   If evidence of imminent risk is present, intervention/plan:     Therapeutic Intervention(s): Cognitive modification, Conflict resolution skills, Distress tolerance skills, Leisure and recreation skills, Stressors assessed and Supportive psychotherapy    Treatment Goal(s)/Objective(s) addressed:   Decrease symptoms of depression  Objective A: Patient will increase activity level by will participating in at least two hours, three times per week in a social or leisure activity with family member or close friend.  Objective B: Patient will express  an increase in positive statements about self by will making at least five positive statements per day about self-circumstances.  Objective C: Patient will spend more time with friend in social situations by spending at least thirty minutes four times per week in a social situation, interacting appropriately with a friend.     Reduce Symptoms of Anxiety:  Objective A: Patient will learn one effective technique for dealing with anxiety each week, and utilize it effectively when feeling anxious.  Objective B: Patient will express an increase in positive statements by making at least five positive statements per day about self or current circumstances.  Objective C: Patient will increase activity level by participating in at least two hours, three times per week in a leisure or social activity.       Progress toward Treatment Goals: Mild decline    Plan:  - Continue Individual therapy    Robert Tapia, Ph.D.  9/29/2020

## 2020-10-14 ENCOUNTER — TELEMEDICINE (OUTPATIENT)
Dept: BEHAVIORAL HEALTH | Facility: CLINIC | Age: 50
End: 2020-10-14
Payer: COMMERCIAL

## 2020-10-14 VITALS — HEIGHT: 69 IN | BODY MASS INDEX: 30.36 KG/M2 | WEIGHT: 205 LBS

## 2020-10-14 DIAGNOSIS — F40.01 PANIC DISORDER WITH AGORAPHOBIA: ICD-10-CM

## 2020-10-14 DIAGNOSIS — F40.01 PANIC DISORDER WITH AGORAPHOBIA AND MILD PANIC ATTACKS: ICD-10-CM

## 2020-10-14 DIAGNOSIS — F43.10 POSTTRAUMATIC STRESS DISORDER: ICD-10-CM

## 2020-10-14 DIAGNOSIS — F33.0 MILD EPISODE OF RECURRENT MAJOR DEPRESSIVE DISORDER (HCC): ICD-10-CM

## 2020-10-14 PROCEDURE — 99213 OFFICE O/P EST LOW 20 MIN: CPT | Mod: 95,CR | Performed by: PSYCHIATRY & NEUROLOGY

## 2020-10-14 PROCEDURE — 90833 PSYTX W PT W E/M 30 MIN: CPT | Mod: 95,CR | Performed by: PSYCHIATRY & NEUROLOGY

## 2020-10-14 RX ORDER — CLONAZEPAM 0.5 MG/1
0.5 TABLET ORAL 2 TIMES DAILY PRN
Qty: 60 TAB | Refills: 2 | Status: SHIPPED | OUTPATIENT
Start: 2020-10-14 | End: 2020-11-13

## 2020-10-14 RX ORDER — PRAZOSIN HYDROCHLORIDE 2 MG/1
4 CAPSULE ORAL EVERY EVENING
Qty: 60 CAP | Refills: 2 | Status: SHIPPED | OUTPATIENT
Start: 2020-10-14 | End: 2020-11-13

## 2020-10-14 RX ORDER — BUPROPION HYDROCHLORIDE 300 MG/1
300 TABLET ORAL EVERY MORNING
Qty: 30 TAB | Refills: 2 | Status: SHIPPED | OUTPATIENT
Start: 2020-10-14 | End: 2020-11-09

## 2020-10-14 RX ORDER — BUSPIRONE HYDROCHLORIDE 30 MG/1
30 TABLET ORAL 2 TIMES DAILY
Qty: 60 TAB | Refills: 2 | Status: SHIPPED | OUTPATIENT
Start: 2020-10-14 | End: 2020-11-13

## 2020-10-14 RX ORDER — FLUOXETINE HYDROCHLORIDE 40 MG/1
80 CAPSULE ORAL
Qty: 60 CAP | Refills: 2 | Status: SHIPPED | OUTPATIENT
Start: 2020-10-14 | End: 2020-11-13

## 2020-10-14 ASSESSMENT — FIBROSIS 4 INDEX: FIB4 SCORE: 0.92

## 2020-10-14 NOTE — PROGRESS NOTES
"                                  [facility] PSYCHIATRIC FOLLOW-UP NOTE    This provider informed the patient their medical records are totally confidential except for the use by other providers involved in their care, or if the patient signs a release, or to report instances of child or elder abuse, or if it is determined they are an immediate risk to harm themselves or others.  To avoid spread of covid-19 virus this appointment was conducted by telehealth.  The patient gave consent to have this follow up session by video telehealth.    Time at beginning of call:  03:00 PM    TOTAL FACE-TO-FACE TIME  30 minutes    CHIEF COMPLAINT       Having depressed mood, panic attacks, and traumatic recollections.    HISTORY OF PRESENT ILLNESS       The patient is a  51yo W male who has been disabled by 2 strokes in 2016 and 2018 and he lives with his 3rd wife and off of her income.  He is followed by this provider for recurrent Major Depression, Panic Disorder with Agoraphobia, and Posttraumatic Stress Disorder.  He has had depressive episodes sincehe had a cerebrovascular accident in Aug 2016.  He had (L) sided weakness and his depression had a typical pattern of anhedonia, decreased sleep and appetite, feeling worthless and hopeless, and having recurrent passive suicidal ideation that \"he'd be better off dead\".  He denies ever having an overt suicidal plan, intent, or attempt.       He has had Panic Disorder with Agoraphobia since his strokes and his panic attacks are triggered when he goes out into public.  He has some symptoms of PTSD with no clear Criterion A including intrusive trauma recollections and dreams, avoidance and hypervigilance.  His anxiety has been elevated because of the corona virus pandemic and he has isolated himself at home except for driving his 23yo son to work.       He smokes cannabis every night.  He has some neurocognitive impairment since his strokes and has difficulty with " dysarthria, word finding, recent memory and weakness in his (L) hand.       He has been treated with radiation seeds for his prostate cancer.    PSYCHOSOCIAL CHANGES SINCE PREVIOUS CONTACT       Having some increased anxiety or stir craziness from the isolation of the pandemic.  RESPONSE TO TREATMENT       Having alleviation of depression by bupropion XL 300mg po QAM and fluoxetine 80 mg po QAM.  PAST PSYCHIATRIC MEDICATIONS       Fluoxetine, buspirone, clonazepam, prazosin, bupropion XL.  MEDICATION SIDE EFFECTS       none    MEDICAL REVIEW OF SYSTEMS:   Constitutional positive - obesity   Eyes negative   Ears/Nose/Mouth/Throat negative   Cardiovascular positive - hyprtension   Respiratory positive - COPD, obstructive sleep apnea and using CPAP   Gastrointestinal positive - GERD, fatty liver,  diverticulitis   Genitourinary negative   Muscular negative   Integumentary negative   Neurological positive - CVA in 2016 and 2018   Endocrine positive - hyperlipidemia   Hematologic/Lymphatic positive - iron deficiency anemia        MENTAL STATUS EVALUATION  There were no vitals taken for this visit.  Participation: Active verbal participation  Grooming:Neat  Orientation: Fully Oriented  Eye contact: Good  Behavior:Calm   Mood: Anxious  Affect: Constricted  Thought process: Logical  Thought content:  Within normal limits  Speech: Rate within normal limits  Perception:  Within normal limits  Memory:  No gross evidence of memory deficits  Insight: Adequate  Judgment: Adequate  Family/couple interaction observations:   Other:  Depression Screen (PHQ-2/PHQ-9) 3/2/2021 3/29/2021 4/19/2021   PHQ-2 Total Score 0 - -   PHQ-2 Total Score - 4 5   PHQ-9 Total Score - - -   PHQ-9 Total Score - 19 16       Interpretation of PHQ-9 Total Score   Score Severity   1-4 No Depression   5-9 Mild Depression   10-14 Moderate Depression   15-19 Moderately Severe Depression   20-27 Severe Depression  Current risk:    Suicide: Low   Homicide: Not  applicable   Self-harm: Low  Relapse: Moderate  Other:   Crisis Safety Plan reviewed?No  If evidence of imminent risk is present, intervention/plan:    Medical Records/Labs/Diagnostic Tests Reviewed: yes    Medical Records/Labs/Diagnostic Tests Ordered: no    DIAGNOSTIC IMPRESSIONS       (1) Major Depression, Recurrent, Moderate (F33.1)       (2) Panic Disorder with Agoraphobia (F40.01)       (3) Postraumatic Stress Disorder (F43.10)       (4) Cannabis Dependence (F12.20)    ASSESSMENT AND PLAN       Having better alleviation of depression on an increased [place] of bupropion XL 300mg po QAM.       Continue bupropion XL 300mg po QAM.       Continue fluoxetine 80mg po QAM.       Continue buspirone 30mg po BID.       Continue prazosin 4mg po QHS.       Continue clonazepam 0.5mg po BID as needed for panic symptoms.       Continue outpatient psychotherapy with Robert Tapia, PhD.       RTC to  Clinic in 3 months for 30 min follow up with this provider.    Time at nd of call:  03:30 PM    30 minutes spent in psychotherapy  Topics addressed in psychotherapy include:  The difference in being disabled and living a disabled life style.  The need to challenge himself with more excursions outside now that his panic symptoms are alleviated.  Explored how to restore a greater sense of meaning in his life.

## 2020-10-15 DIAGNOSIS — I10 ESSENTIAL HYPERTENSION: ICD-10-CM

## 2020-10-15 RX ORDER — LISINOPRIL 40 MG/1
TABLET ORAL
Qty: 90 TAB | Refills: 3 | Status: SHIPPED | OUTPATIENT
Start: 2020-10-15 | End: 2022-12-02

## 2020-10-15 NOTE — TELEPHONE ENCOUNTER
Received request via: Pharmacy    Was the patient seen in the last year in this department? Yes    Does the patient have an active prescription (recently filled or refills available) for medication(s) requested? No   Requested Prescriptions     Pending Prescriptions Disp Refills   • lisinopril (PRINIVIL) 40 MG tablet [Pharmacy Med Name: LISINOPRIL 40 MG TABLET] 90 Tab 3     Sig: TAKE 1 TABLET BY MOUTH EVERY DAY

## 2020-10-27 ENCOUNTER — TELEMEDICINE (OUTPATIENT)
Dept: BEHAVIORAL HEALTH | Facility: CLINIC | Age: 50
End: 2020-10-27
Payer: COMMERCIAL

## 2020-10-27 DIAGNOSIS — F33.0 MILD EPISODE OF RECURRENT MAJOR DEPRESSIVE DISORDER (HCC): ICD-10-CM

## 2020-10-27 DIAGNOSIS — F40.01 PANIC DISORDER WITH AGORAPHOBIA AND MILD PANIC ATTACKS: ICD-10-CM

## 2020-10-27 DIAGNOSIS — F43.10 POSTTRAUMATIC STRESS DISORDER: ICD-10-CM

## 2020-10-27 PROCEDURE — 90834 PSYTX W PT 45 MINUTES: CPT | Mod: 95,CR | Performed by: PSYCHOLOGIST

## 2020-10-27 NOTE — BH THERAPY
[facility]  Therapy Progress Note    Patient Name: Luciano Silverman  Patient MRN: [id number]  Today's Date: 10/27/2020     Type of session:Individual psychotherapy  Length of session: 45 minutes  Persons in attendance:Patient     This evaluation was conducted via Zoom using secure and encrypted videoconferencing (virtual) technology. The patient was in a private location in the [facility]. Verbal consent was obtained. Patient's identity was verified.    Subjective/New Info:   Luciano Silverman is a 50 year old white male referred for assessment by BENIGNO Tran (nurse practitioner) for the treatment of anxiety and depression. Patient continues to struggle with his recovery from having his prostate procedure. He stated that he has been very overwhelmed. He struggles with the anxiety of his surgery, the pandemic, and the unknown outcome of his disability claim. He is very worried about his finances. Talked with patient about the importance of being more focused on the positive things in his life. He stated that he does attempt to get out of the house as much as he is able. Patient's youngest son is staying with patient and his wife, consequently he is helping with the finances and some of the activities that require physical exertion. Patient does appear quite a bit more depressed. Due to this clinician's skilled nursing patient is being referred to another clinician within this department.          Objective/Observations:              Participation: Active verbal participation, Attentive, Engaged and Open to feedback              Grooming: Casual and Neat              Cognition: Alert and Fully Oriented              Eye contact: Good              Mood: Depressed and Anxious              Affect: Flexible              Thought process: Logical and Goal-directed              Speech: Rate within normal limits and Volume within normal limits              Other:   Patient was alert and oriented to person,  place, and time. Eye contact was appropriate. No abnormalities in attention or concentration were noted. No abnormalities of movement present; psychomotor activity was normal. Speech was fluent and regular in rhythm, rate, volume, and tone. Thought processes were linear, logical, and goal-directed. There was no evidence of thought disorder. No auditory or visual hallucinations. Long and short term memory appeared to be intact. Insight, judgment, and impulse control were deemed to be within normal limits. Reported mood was depressed, fairly negative, and frustrated. He also appears a bit more depressed this visit. Affect was appropriate and congruent with thought content and conversation. Patient denied current suicidal and homicidal ideation in plan, intent, and preparatory behavior.    Diagnoses:   1. Mild episode of recurrent major depressive disorder (HCC)    2. PTSD (post-traumatic stress disorder)    3. Panic disorder with agoraphobia and mild panic attacks         Current risk:   SUICIDE: Low   Homicide: Low   Self-harm: Low   Relapse: Not applicable   Other:    Safety Plan reviewed? Not Indicated   If evidence of imminent risk is present, intervention/plan:     Therapeutic Intervention(s): Cognitive modification, Distress tolerance skills, Leisure and recreation skills, Stressors assessed and Supportive psychotherapy    Treatment Goal(s)/Objective(s) addressed:    Decrease symptoms of depression  Objective A: Patient will increase activity level by will participating in at least two hours, three times per week in a social or leisure activity with family member or close friend.  Objective B: Patient will express an increase in positive statements about self by will making at least five positive statements per day about self-circumstances.  Objective C: Patient will spend more time with friend in social situations by spending at least thirty minutes four times per week in a social situation, interacting  appropriately with a friend.     Reduce Symptoms of Anxiety:  Objective A: Patient will learn one effective technique for dealing with anxiety each week, and utilize it effectively when feeling anxious.  Objective B: Patient will express an increase in positive statements by making at least five positive statements per day about self or current circumstances.  Objective C: Patient will increase activity level by participating in at least two hours, three times per week in a leisure or social activity.       Progress toward Treatment Goals: Mild decline    Plan:  - Continue Individual therapy    Robert Tapia, Ph.D.  10/27/2020

## 2020-11-09 RX ORDER — BUPROPION HYDROCHLORIDE 300 MG/1
TABLET ORAL
Qty: 30 TAB | Refills: 2 | Status: SHIPPED | OUTPATIENT
Start: 2020-11-09 | End: 2021-02-05 | Stop reason: SDUPTHER

## 2020-11-24 ENCOUNTER — TELEMEDICINE (OUTPATIENT)
Dept: BEHAVIORAL HEALTH | Facility: CLINIC | Age: 50
End: 2020-11-24
Payer: COMMERCIAL

## 2020-11-24 DIAGNOSIS — F43.10 POSTTRAUMATIC STRESS DISORDER: ICD-10-CM

## 2020-11-24 DIAGNOSIS — F33.0 MILD EPISODE OF RECURRENT MAJOR DEPRESSIVE DISORDER (HCC): ICD-10-CM

## 2020-11-24 DIAGNOSIS — F40.01 PANIC DISORDER WITH AGORAPHOBIA AND MILD PANIC ATTACKS: ICD-10-CM

## 2020-11-24 PROCEDURE — 90791 PSYCH DIAGNOSTIC EVALUATION: CPT | Performed by: MARRIAGE & FAMILY THERAPIST
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
## 2021-01-06 ENCOUNTER — TELEMEDICINE (OUTPATIENT)
Dept: BEHAVIORAL HEALTH | Facility: CLINIC | Age: 51
End: 2021-01-06
Payer: COMMERCIAL

## 2021-01-06 DIAGNOSIS — F40.01 PANIC DISORDER WITH AGORAPHOBIA AND MILD PANIC ATTACKS: ICD-10-CM

## 2021-01-06 DIAGNOSIS — F33.0 MILD EPISODE OF RECURRENT MAJOR DEPRESSIVE DISORDER (HCC): ICD-10-CM

## 2021-01-06 PROCEDURE — 90834 PSYTX W PT 45 MINUTES: CPT | Mod: 95,CR | Performed by: MARRIAGE & FAMILY THERAPIST
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
## 2021-01-11 ENCOUNTER — HOSPITAL ENCOUNTER (OUTPATIENT)
Dept: LAB | Facility: MEDICAL CENTER | Age: 51
End: 2021-01-11
Attending: INTERNAL MEDICINE
Payer: COMMERCIAL

## 2021-01-11 LAB
ALBUMIN SERPL BCP-MCNC: 4.5 G/DL (ref 3.2–4.9)
ALBUMIN/GLOB SERPL: 1.6 G/DL
ALP SERPL-CCNC: 57 U/L (ref 30–99)
ALT SERPL-CCNC: 65 U/L (ref 2–50)
ANION GAP SERPL CALC-SCNC: 10 MMOL/L (ref 7–16)
AST SERPL-CCNC: 32 U/L (ref 12–45)
BILIRUB SERPL-MCNC: 0.4 MG/DL (ref 0.1–1.5)
BUN SERPL-MCNC: 10 MG/DL (ref 8–22)
CALCIUM SERPL-MCNC: 10.1 MG/DL (ref 8.5–10.5)
CHLORIDE SERPL-SCNC: 98 MMOL/L (ref 96–112)
CHOLEST SERPL-MCNC: 141 MG/DL (ref 100–199)
CO2 SERPL-SCNC: 28 MMOL/L (ref 20–33)
CREAT SERPL-MCNC: 1.11 MG/DL (ref 0.5–1.4)
FASTING STATUS PATIENT QL REPORTED: NORMAL
GLOBULIN SER CALC-MCNC: 2.9 G/DL (ref 1.9–3.5)
GLUCOSE SERPL-MCNC: 110 MG/DL (ref 65–99)
HDLC SERPL-MCNC: 40 MG/DL
LDLC SERPL CALC-MCNC: 88 MG/DL
POTASSIUM SERPL-SCNC: 4.4 MMOL/L (ref 3.6–5.5)
PROT SERPL-MCNC: 7.4 G/DL (ref 6–8.2)
SODIUM SERPL-SCNC: 136 MMOL/L (ref 135–145)
TRIGL SERPL-MCNC: 67 MG/DL (ref 0–149)

## 2021-01-11 PROCEDURE — 80061 LIPID PANEL: CPT

## 2021-01-11 PROCEDURE — 36415 COLL VENOUS BLD VENIPUNCTURE: CPT

## 2021-01-11 PROCEDURE — 80053 COMPREHEN METABOLIC PANEL: CPT

## 2021-01-12 DIAGNOSIS — I10 ESSENTIAL HYPERTENSION: ICD-10-CM

## 2021-01-12 RX ORDER — HYDROCHLOROTHIAZIDE 12.5 MG/1
CAPSULE, GELATIN COATED ORAL
Qty: 30 CAP | Refills: 3 | Status: SHIPPED | OUTPATIENT
Start: 2021-01-12 | End: 2021-04-14

## 2021-01-12 NOTE — TELEPHONE ENCOUNTER
Received request via: Pharmacy    Was the patient seen in the last year in this department? Yes    Does the patient have an active prescription (recently filled or refills available) for medication(s) requested? No     LOV 3/9/2020

## 2021-02-05 ENCOUNTER — TELEMEDICINE (OUTPATIENT)
Dept: BEHAVIORAL HEALTH | Facility: CLINIC | Age: 51
End: 2021-02-05
Payer: COMMERCIAL

## 2021-02-05 VITALS — HEIGHT: 69 IN | BODY MASS INDEX: 30.36 KG/M2 | WEIGHT: 205 LBS

## 2021-02-05 DIAGNOSIS — F40.01 PANIC DISORDER WITH AGORAPHOBIA AND MILD PANIC ATTACKS: ICD-10-CM

## 2021-02-05 DIAGNOSIS — F33.0 MILD EPISODE OF RECURRENT MAJOR DEPRESSIVE DISORDER (HCC): ICD-10-CM

## 2021-02-05 DIAGNOSIS — F43.10 POSTTRAUMATIC STRESS DISORDER: ICD-10-CM

## 2021-02-05 PROCEDURE — 90833 PSYTX W PT W E/M 30 MIN: CPT | Mod: 95,CR | Performed by: PSYCHIATRY & NEUROLOGY

## 2021-02-05 PROCEDURE — 99213 OFFICE O/P EST LOW 20 MIN: CPT | Mod: 95,CR | Performed by: PSYCHIATRY & NEUROLOGY

## 2021-02-05 RX ORDER — TAMSULOSIN HYDROCHLORIDE 0.4 MG/1
0.4 CAPSULE ORAL
COMMUNITY
Start: 2020-12-03

## 2021-02-05 RX ORDER — FLUOXETINE HYDROCHLORIDE 40 MG/1
80 CAPSULE ORAL DAILY
Qty: 180 CAP | Refills: 0 | Status: SHIPPED | OUTPATIENT
Start: 2021-02-05 | End: 2021-05-18

## 2021-02-05 RX ORDER — PRAZOSIN HYDROCHLORIDE 2 MG/1
4 CAPSULE ORAL
Qty: 180 CAP | Refills: 0 | Status: SHIPPED | OUTPATIENT
Start: 2021-02-05 | End: 2021-05-18

## 2021-02-05 RX ORDER — CLONAZEPAM 0.5 MG/1
0.5 TABLET ORAL 2 TIMES DAILY PRN
Qty: 60 TAB | Refills: 2 | Status: SHIPPED | OUTPATIENT
Start: 2021-02-05 | End: 2021-03-07

## 2021-02-05 RX ORDER — BUPROPION HYDROCHLORIDE 300 MG/1
TABLET ORAL
Qty: 90 TAB | Refills: 0 | Status: SHIPPED | OUTPATIENT
Start: 2021-02-05 | End: 2021-05-07

## 2021-02-05 RX ORDER — BUSPIRONE HYDROCHLORIDE 30 MG/1
30 TABLET ORAL 2 TIMES DAILY
Qty: 180 TAB | Refills: 0 | Status: SHIPPED | OUTPATIENT
Start: 2021-02-05 | End: 2021-05-28

## 2021-02-05 ASSESSMENT — FIBROSIS 4 INDEX: FIB4 SCORE: 0.96

## 2021-02-05 NOTE — PROGRESS NOTES
"                                  [facility] PSYCHIATRIC FOLLOW-UP NOTE    This provider informed the patient their medical records are totally confidential except for the use by other providers involved in their care, or if the patient signs a release, or to report instances of child or elder abuse, or if it is determined they are an immediate risk to harm themselves or others.  To avoid spread of covid-19 virus this appointment was conducted by telehealth.  The patient gave consent to have this follow up session by video telehealth.    Time at beginning of call:  09:30 AM    TOTAL FACE-TO-FACE TIME  30 minutes    CHIEF COMPLAINT       Having depressed mood, panic attacks, and traumatic recollections.  HISTORY OF PRESENT ILLNESS       The patient is a  49yo W male who has been disabled by 2 strokes in 2016 and 2018 and he lives with his 3rd wife and off of her income.  He is followed by this provider for recurrent Major Depression, Panic Disorder with Agoraphobia, and Posttraumatic Stress Disorder.  He has had depressive episodes since he had a cerebrovascular accident in Aug 2016.  He had (L) sided weakness and his depression had a typical pattern of anhedonia, decreased sleep and appetite, feeling worthless and hopeless, and having recurrent passive suicidal ideation that \"he'd be better off dead\".  He denies ever having an overt suicidal plan, intent, or attempt.       He has had Panic Disorder with Agoraphobia since his strokes and his panic attacks are triggered when he goes out into public.  He has some symptoms of PTSD with no clear Criterion A including intrusive trauma recollections and dreams, avoidance and hypervigilance.  His anxiety has been elevated because of the corona virus pandemic and he has isolated himself at home except for driving his 23yo son to work.       He smokes cannabis every night.  He has some neurocognitive impairment since his strokes and has difficulty with " dysarthria, word finding, recent memory and weakness in his (L) hand.       He has been treated with radiation seeds for his prostate cancer.    PSYCHOSOCIAL CHANGES SINCE PREVIOUS CONTACT       The patient gets out of the house and wears a mask.  RESPONSE TO TREATMENT       Has alleviation of anxiety and depression by fluoxetine 80mg po QAM.  PAST PSYCHIATRIC MEDICATIONS       Fluoxetine, buspirone, clonazepam, prazosin, bupropion XL.  MEDICATION SIDE EFFECTS       None    MEDICAL REVIEW OF SYSTEMS:   Constitutional positive - obesity   Eyes negative   Ears/Nose/Mouth/Throat negative   Cardiovascular positive - hyprtension   Respiratory positive - COPD, obstructive sleep apnea and using CPAP   Gastrointestinal positive - GERD, fatty liver,  diverticulitis   Genitourinary negative   Muscular negative   Integumentary negative   Neurological positive - CVA in 2016 and 2018   Endocrine positive - hyperlipidemia   Hematologic/Lymphatic positive - iron deficiency anemia           MENTAL STATUS EVALUATION  There were no vitals taken for this visit.  Participation: Active verbal participation  Grooming:Neat  Orientation: Fully Oriented  Eye contact: Good  Behavior:Tense   Mood: Anxious  Affect: Constricted  Thought process: Logical  Thought content:  Within normal limits  Speech: Rate within normal limits and Volume within normal limits  Perception:  Within normal limits  Memory:  No gross evidence of memory deficits  Insight: Good  Judgment: Good  Family/couple interaction observations:   Other:  Depression Screen (PHQ-2/PHQ-9) 3/2/2021 3/29/2021 4/19/2021   PHQ-2 Total Score 0 - -   PHQ-2 Total Score - 4 5   PHQ-9 Total Score - - -   PHQ-9 Total Score - 19 16       Interpretation of PHQ-9 Total Score   Score Severity   1-4 No Depression   5-9 Mild Depression   10-14 Moderate Depression   15-19 Moderately Severe Depression   20-27 Severe Depression  Current risk:    Suicide: Low   Homicide: Not applicable   Self-harm:  Low  Relapse: Moderate  Other:   Crisis Safety Plan reviewed?No  If evidence of imminent risk is present, intervention/plan:    Medical Records/Labs/Diagnostic Tests Reviewed: yes    Medical Records/Labs/Diagnostic Tests Ordered: no    DIAGNOSTIC IMPRESSIONS       (1) Major Depression, Recurrent, Moderate (F33.1)       (2) Panic Disorder with Agoraphobia (F40.01)       (3) Postraumatic Stress Disorder (F43.10)       (4) Cannabis Dependence (F12.20)    ASSESSMENT AND PLAN       Having better alleviation of depression on an increased [place] of bupropion XL 300mg po QAM.       Continue bupropion XL 300mg po QAM.       Continue fluoxetine 80mg po QAM.       Continue buspirone 30mg po BID.       Continue prazosin 4mg po QHS.       Continue clonazepam 0.5mg po BID as needed for panic symptoms.       RTC to  Clinic in 3 months for 30 min follow up with this provider    Time at end of call:  10:00 AM    30 minutes spent in psychotherapy  Topics addressed in psychotherapy include:  His feeling of humiliation and guilt having his wife support him.  How increase understanding of panic attacks can assist in avoiding them.

## 2021-02-08 ENCOUNTER — APPOINTMENT (OUTPATIENT)
Dept: BEHAVIORAL HEALTH | Facility: CLINIC | Age: 51
End: 2021-02-08
Payer: COMMERCIAL

## 2021-02-18 ENCOUNTER — TELEPHONE (OUTPATIENT)
Dept: MEDICAL GROUP | Facility: MEDICAL CENTER | Age: 51
End: 2021-02-18

## 2021-02-18 NOTE — TELEPHONE ENCOUNTER
ESTABLISHED PATIENT PRE-VISIT PLANNING     Patient was NOT contacted to complete PVP.     Note: Patient will not be contacted if there is no indication to call.     1.  Reviewed notes from the last few office visits within the medical group: Yes    2.  If any orders were placed at last visit or intended to be done for this visit (i.e. 6 mos follow-up), do we have Results/Consult Notes?         •  Labs - Labs ordered, completed on 05/07/20 and results are in chart.  Note: If patient appointment is for lab review and patient did not complete labs, check with provider if OK to reschedule patient until labs completed.       •  Imaging - Imaging ordered, completed and results are in chart.       •  Referrals - No referrals were ordered at last office visit.    3. Is this appointment scheduled as a Hospital Follow-Up? No    4.  Immunizations were updated in Epic using Reconcile Outside Information activity? Yes    5.  Patient is due for the following Health Maintenance Topics:   Health Maintenance Due   Topic Date Due   • Annual Pulmonary Function Test / Spirometry  07[birth year]04/1976

## 2021-02-23 ENCOUNTER — OFFICE VISIT (OUTPATIENT)
Dept: MEDICAL GROUP | Facility: MEDICAL CENTER | Age: 51
End: 2021-02-23
Payer: COMMERCIAL

## 2021-02-23 VITALS
DIASTOLIC BLOOD PRESSURE: 60 MMHG | SYSTOLIC BLOOD PRESSURE: 104 MMHG | RESPIRATION RATE: 16 BRPM | TEMPERATURE: 96.8 F | OXYGEN SATURATION: 94 % | WEIGHT: 205.2 LBS | BODY MASS INDEX: 30.39 KG/M2 | HEART RATE: 70 BPM | HEIGHT: 69 IN

## 2021-02-23 DIAGNOSIS — F43.10 POSTTRAUMATIC STRESS DISORDER: ICD-10-CM

## 2021-02-23 DIAGNOSIS — F41.9 ANXIETY: ICD-10-CM

## 2021-02-23 DIAGNOSIS — M54.6 THORACIC SPINE PAIN: ICD-10-CM

## 2021-02-23 DIAGNOSIS — C61 PROSTATE CANCER (HCC): ICD-10-CM

## 2021-02-23 DIAGNOSIS — G56.03 BILATERAL CARPAL TUNNEL SYNDROME: ICD-10-CM

## 2021-02-23 DIAGNOSIS — R74.8 ELEVATED LIVER ENZYMES: ICD-10-CM

## 2021-02-23 DIAGNOSIS — M54.2 NECK PAIN: ICD-10-CM

## 2021-02-23 DIAGNOSIS — R73.03 PREDIABETES: ICD-10-CM

## 2021-02-23 DIAGNOSIS — F33.0 MILD EPISODE OF RECURRENT MAJOR DEPRESSIVE DISORDER (HCC): ICD-10-CM

## 2021-02-23 DIAGNOSIS — D50.0 IRON DEFICIENCY ANEMIA DUE TO CHRONIC BLOOD LOSS: ICD-10-CM

## 2021-02-23 PROBLEM — F12.90 MARIJUANA USER: Status: ACTIVE | Noted: 2020-05-11

## 2021-02-23 PROCEDURE — 99214 OFFICE O/P EST MOD 30 MIN: CPT | Performed by: INTERNAL MEDICINE

## 2021-02-23 ASSESSMENT — PATIENT HEALTH QUESTIONNAIRE - PHQ9
9. THOUGHTS THAT YOU WOULD BE BETTER OFF DEAD, OR OF HURTING YOURSELF: MORE THAN HALF THE DAYS
2. FEELING DOWN, DEPRESSED, IRRITABLE, OR HOPELESS: NEARLY EVERY DAY
7. TROUBLE CONCENTRATING ON THINGS, SUCH AS READING THE NEWSPAPER OR WATCHING TELEVISION: NEARLY EVERY DAY
8. MOVING OR SPEAKING SO SLOWLY THAT OTHER PEOPLE COULD HAVE NOTICED. OR THE OPPOSITE, BEING SO FIGETY OR RESTLESS THAT YOU HAVE BEEN MOVING AROUND A LOT MORE THAN USUAL: NEARLY EVERY DAY
5. POOR APPETITE OR OVEREATING: NEARLY EVERY DAY
SUM OF ALL RESPONSES TO PHQ QUESTIONS 1-9: 26
3. TROUBLE FALLING OR STAYING ASLEEP OR SLEEPING TOO MUCH: NEARLY EVERY DAY
SUM OF ALL RESPONSES TO PHQ9 QUESTIONS 1 AND 2: 6
4. FEELING TIRED OR HAVING LITTLE ENERGY: NEARLY EVERY DAY
6. FEELING BAD ABOUT YOURSELF - OR THAT YOU ARE A FAILURE OR HAVE LET YOURSELF OR YOUR FAMILY DOWN: NEARLY EVERY DAY
1. LITTLE INTEREST OR PLEASURE IN DOING THINGS: NEARLY EVERY DAY

## 2021-02-23 ASSESSMENT — PAIN SCALES - GENERAL: PAINLEVEL: 8=MODERATE-SEVERE PAIN

## 2021-02-23 ASSESSMENT — FIBROSIS 4 INDEX: FIB4 SCORE: 0.96

## 2021-02-23 NOTE — PROGRESS NOTES
CC:  Diagnoses of PTSD (post-traumatic stress disorder), Mild episode of recurrent major depressive disorder (HCC), Anxiety, Prostate cancer (HCC), Prediabetes, Iron deficiency anemia due to chronic blood loss, Elevated liver enzymes, Thoracic spine pain, Neck pain, and Bilateral carpal tunnel syndrome were pertinent to this visit.    HISTORY OF THE PRESENT ILLNESS: Patient is a 50 y.o. male. This pleasant patient is here today for follow-up.    Has been almost year since we last saw each other, Luciano has been really busy following up with urology and mental health.    Since her last visit he was confirmed to have prostate cancer and says he underwent brachytherapy, Dr. Campo is retiring so he is seeing Dr. Jerome Grove with repeat PSA in April.    He says his depression is ongoing with anxiety PTSD.  He is concerned that he was told his insurance is no longer taken by our mental health department, I will send an email to his psychiatrist to confirm if he needs a new referral.  He has no plan to end his life, and he does know how to get help through the ER suicide hotline if mood worsens, PHQ-9 score today of 25 and he states compliance with his multiple medications including Wellbutrin, clonazepam, buspirone, Prozac, prazosin.    Noted blood pressure he denies any dizziness or symptoms of hypotension, he says sometimes his blood pressure goes up to 150/90 at home.  States compliance with his antihypertensive therapy continue to follow by Dr. Segovia his cardiologist.    Due to followed by GABRIELA khan, note brief reviewed 9/9/2024 sleep apnea.  Repeat chest x-ray showed resolution of the opacity.    Though his main reason today is neck and back pain.  He says he was reaching overhead for something and triggered his pain.  There was no fall.  He has had chronic weakness of all of his extremities.  Previous saw Dr. Candelaria and he tells me EMG showed carpal tunnel, I do not have this report, though he was having  weakness both hands dropping objects, he says he does not remember if he was offered surgery for this.  No loss of bowel bladder, no groin numbness.  Pain does shoot down his legs.  In the past gabapentin and Lyrica were not helpful.  He is using Tylenol Excedrin as well as hot and cold packs at home.  Says in the past MEGGAN treatment was not helpful.  Clonazepam does help his back.  Flexeril is minimally helpful but does help some.  He is interested in any treatments including injection therapy through physiatry that may be helpful in starting physical therapy.    Allergies: Patient has no known allergies.    Current Outpatient Medications Ordered in Epic   Medication Sig Dispense Refill   • tamsulosin (FLOMAX) 0.4 MG capsule Take 0.4 mg by mouth every day.     • buPROPion (WELLBUTRIN XL) 300 MG XL tablet TAKE 1 TABLET BY MOUTH EVERY MORNING 90 Tab 0   • fluoxetine (PROZAC) 40 MG capsule Take 2 Caps by mouth every day for 90 days. 180 Cap 0   • busPIRone (BUSPAR) 30 MG tablet Take 1 Tab by mouth 2 times a day for 90 days. 180 Tab 0   • prazosin (MINIPRESS) 2 MG Cap Take 2 Caps by mouth every bedtime for 90 days. 180 Cap 0   • clonazePAM (KLONOPIN) 0.5 MG Tab Take 1 Tab by mouth 2 times a day as needed for up to 30 days. 60 Tab 2   • hydrochlorothiazide (MICROZIDE) 12.5 MG capsule TAKE 1 CAPSULE BY MOUTH EVERY DAY 30 Cap 3   • lisinopril (PRINIVIL) 40 MG tablet TAKE 1 TABLET BY MOUTH EVERY DAY 90 Tab 3   • ferrous sulfate 325 (65 Fe) MG tablet TAKE 1 TABLET BY MOUTH TWICE A  Tab 2   • fluticasone (FLONASE) 50 MCG/ACT nasal spray 1 SPRAY INTO EACH NOSTRIL EVERY DAY. 48 mL 2   • albuterol 108 (90 Base) MCG/ACT Aero Soln inhalation aerosol Inhale 2 Puffs by mouth every four hours as needed for Shortness of Breath. 1 Inhaler 0   • REPATHA SURECLICK 140 MG/ML Solution Auto-injector Inject 1 Dose as instructed every 14 days.     • urea (CARMOL) 10 % cream Use after showers and daily all over to dry skin patches. 2  "Tube 3   • cyclobenzaprine (FLEXERIL) 10 MG Tab Take 10 mg by mouth as needed.  1   • loratadine (CLARITIN) 10 MG Tab Take 10 mg by mouth every day.     • atorvastatin (LIPITOR) 80 MG tablet Take 80 mg by mouth every day.  11   • clopidogrel (PLAVIX) 75 MG Tab Take 75 mg by mouth every day.  1   • fenofibrate (TRICOR) 145 MG Tab TAKE 1 TABLET BY ORAL ROUTE EVERY DAY BEFORE BED  5   • omeprazole (PRILOSEC) 20 MG delayed-release capsule Take 20 mg by mouth every day.       No current [facility]-ordered facility-administered medications on file.       Past Medical History:   Diagnosis Date   • Anxiety    • Arthritis    • COPD (chronic obstructive pulmonary disease) (HCC)    • Depression    • Hyperlipidemia    • Hypertension    • Panic disorder with agoraphobia and mild panic attacks 2019   • PTSD (post-traumatic stress disorder)    • Stroke (HCC)        Past Surgical History:   Procedure Laterality Date   • ABDOMINAL EXPLORATION     • APPENDECTOMY         Social History     Tobacco Use   • Smoking status: Former Smoker     Packs/day: 0.50     Years: 38.00     Pack years: 19.00     Quit date: 2020     Years since quittin.0   • Smokeless tobacco: Current User     Types: Chew   • Tobacco comment: down 5/cigarrettes a day or less   Substance Use Topics   • Alcohol use: Yes     Comment: socially - usually 1 beer twice a year   • Drug use: Yes     Frequency: 7.0 times per week     Types: Marijuana     Comment: nightly to sleep       Social History     Social History Narrative   • Not on file       Family History   Problem Relation Age of Onset   • Hypertension Mother    • Hyperlipidemia Mother    • Hypertension Father    • Lung Disease Father    • Cancer Child         non hodkins lymphoma   • Anxiety disorder Brother        Exam: /60 (BP Location: Left arm, Patient Position: Sitting)   Pulse 70   Temp 36 °C (96.8 °F) (Temporal)   Resp 16   Ht 1.753 m (5' 9\")   Wt 93.1 kg (205 lb 3.2 oz)   SpO2 94%  Body " mass index is 30.3 kg/m².    General: Normal appearing. No distress.  EYES: Conjunctiva clear lids without ptosis, pupils equal  EARS: Normal shape and contour   Pulmonary: Clear to ausculation.  Normal effort. No rales or wheezing.  Cardiovascular: Regular rate and rhythm without significant murmur.   Abdomen: Soft, nontender, nondistended. Normal bowel sounds.  Neurologic: He is straight leg raise negative bilaterally.  Gross sensation legs intact bilaterally.  Patella reflexes symmetric.  Strength of right leg is 4- out of 5 and on left 4 out of 5 overall.  Skin: Warm and dry.  No obvious lesions.  Musculoskeletal: Normal gait. No extremity cyanosis, clubbing, or edema.  Psych: Normal mood and affect. Alert and oriented x3. Judgment and insight is normal.      Assessment/Plan  1. PTSD (post-traumatic stress disorder)  Email to mental health team to see if his insurance is still excepted here if he needs any referral.  Patient mental health team following, no change in medications today since he is followed closely by mental health.  - REFERRAL TO PSYCHIATRY  - REFERRAL TO PSYCHOLOGY    2. Mild episode of recurrent major depressive disorder (HCC)  See #1 above, no plan for suicide.  The PHQ-9 score is elevated, will kindly have psychiatry team notified and patient says he knows how to get help through ER suicide hotline if things change.  He says his mood has not changed significantly in any acute matter and he is compliant with his medications.  His psychiatrist did kindly send me an email today indicating that the patient has been on the same insurance and that since it is out of network he has a slightly higher co-pay and this has not changed.  I will be encouraging Luciano to stay with his current psychiatry team.  - REFERRAL TO PSYCHIATRY  - REFERRAL TO PSYCHOLOGY    3. Anxiety  See #2 above  - REFERRAL TO PSYCHIATRY  - REFERRAL TO PSYCHOLOGY    4. Prostate cancer (HCC)  Follow-up urology, will need to obtain  these results as well.  - DX-THORACIC SPINE-2 VIEWS; Future    5. Prediabetes  Plan to reassess long-term work on diet/exercise  - HEMOGLOBIN A1C; Future    6. Iron deficiency anemia due to chronic blood loss  Plan to reassess he says he is taking iron twice daily currently  - CBC WITHOUT DIFFERENTIAL; Future  - FERRITIN; Future  - IRON/TOTAL IRON BIND; Future    7. Elevated liver enzymes  Plan to reassess  - Comp Metabolic Panel; Future    8. Thoracic spine pain  Physical therapy, physiatry referral.  Plan for trial of capsaicin patches, additional conservative treatment.  Could consider working with psychiatry to consider changing SSRI to SNRI for better pain control properties.  Need to confirm for his he can still continue with his psychiatry team with his insurance change.  - DX-THORACIC SPINE-2 VIEWS; Future  - REFERRAL TO OUTPATIENT INTERVENTIONAL PAIN CLINIC  - REFERRAL TO PHYSICAL THERAPY    9. Neck pain  See discussion #8  - DX-CERVICAL SPINE-2 OR 3 VIEWS; Future  - REFERRAL TO OUTPATIENT INTERVENTIONAL PAIN CLINIC  - REFERRAL TO PHYSICAL THERAPY    10. Bilateral carpal tunnel syndrome  EMG was done through Dr. Candelaria's office will try to obtain result but patient says consistent carpal tunnel and he has been dropping objects.  He may benefit from carpal tunnel surgery, referred to physiatry for more evaluation.  - REFERRAL TO OUTPATIENT INTERVENTIONAL PAIN CLINIC        RTC within 6 weeks      Please note that this dictation was created using voice recognition software. I have made every reasonable attempt to correct obvious errors, but I expect that there are errors of grammar and possibly content that I did not discover before finalizing the note.

## 2021-02-28 ENCOUNTER — APPOINTMENT (OUTPATIENT)
Dept: RADIOLOGY | Facility: MEDICAL CENTER | Age: 51
DRG: 387 | End: 2021-02-28
Attending: EMERGENCY MEDICINE
Payer: COMMERCIAL

## 2021-02-28 ENCOUNTER — HOSPITAL ENCOUNTER (INPATIENT)
Facility: MEDICAL CENTER | Age: 51
LOS: 1 days | DRG: 387 | End: 2021-03-02
Attending: EMERGENCY MEDICINE | Admitting: HOSPITALIST
Payer: COMMERCIAL

## 2021-02-28 DIAGNOSIS — K51.00 PANCOLITIS (HCC): ICD-10-CM

## 2021-02-28 LAB
ALBUMIN SERPL BCP-MCNC: 4.6 G/DL (ref 3.2–4.9)
ALBUMIN/GLOB SERPL: 1.4 G/DL
ALP SERPL-CCNC: 74 U/L (ref 30–99)
ALT SERPL-CCNC: 45 U/L (ref 2–50)
ANION GAP SERPL CALC-SCNC: 16 MMOL/L (ref 7–16)
AST SERPL-CCNC: 24 U/L (ref 12–45)
BASOPHILS # BLD AUTO: 0.2 % (ref 0–1.8)
BASOPHILS # BLD: 0.05 K/UL (ref 0–0.12)
BILIRUB SERPL-MCNC: 0.5 MG/DL (ref 0.1–1.5)
BUN SERPL-MCNC: 21 MG/DL (ref 8–22)
CALCIUM SERPL-MCNC: 10.2 MG/DL (ref 8.5–10.5)
CHLORIDE SERPL-SCNC: 96 MMOL/L (ref 96–112)
CO2 SERPL-SCNC: 21 MMOL/L (ref 20–33)
CREAT SERPL-MCNC: 1.28 MG/DL (ref 0.5–1.4)
EOSINOPHIL # BLD AUTO: 0.03 K/UL (ref 0–0.51)
EOSINOPHIL NFR BLD: 0.1 % (ref 0–6.9)
ERYTHROCYTE [DISTWIDTH] IN BLOOD BY AUTOMATED COUNT: 41.9 FL (ref 35.9–50)
GLOBULIN SER CALC-MCNC: 3.3 G/DL (ref 1.9–3.5)
GLUCOSE SERPL-MCNC: 173 MG/DL (ref 65–99)
HCT VFR BLD AUTO: 46.3 % (ref 42–52)
HGB BLD-MCNC: 15.3 G/DL (ref 14–18)
IMM GRANULOCYTES # BLD AUTO: 0.18 K/UL (ref 0–0.11)
IMM GRANULOCYTES NFR BLD AUTO: 0.8 % (ref 0–0.9)
LIPASE SERPL-CCNC: 19 U/L (ref 11–82)
LYMPHOCYTES # BLD AUTO: 0.68 K/UL (ref 1–4.8)
LYMPHOCYTES NFR BLD: 3 % (ref 22–41)
MCH RBC QN AUTO: 30.1 PG (ref 27–33)
MCHC RBC AUTO-ENTMCNC: 33 G/DL (ref 33.7–35.3)
MCV RBC AUTO: 91 FL (ref 81.4–97.8)
MONOCYTES # BLD AUTO: 1.7 K/UL (ref 0–0.85)
MONOCYTES NFR BLD AUTO: 7.5 % (ref 0–13.4)
NEUTROPHILS # BLD AUTO: 20.03 K/UL (ref 1.82–7.42)
NEUTROPHILS NFR BLD: 88.4 % (ref 44–72)
NRBC # BLD AUTO: 0 K/UL
NRBC BLD-RTO: 0 /100 WBC
PLATELET # BLD AUTO: 296 K/UL (ref 164–446)
PMV BLD AUTO: 9.9 FL (ref 9–12.9)
POTASSIUM SERPL-SCNC: 3.5 MMOL/L (ref 3.6–5.5)
PROT SERPL-MCNC: 7.9 G/DL (ref 6–8.2)
RBC # BLD AUTO: 5.09 M/UL (ref 4.7–6.1)
SODIUM SERPL-SCNC: 133 MMOL/L (ref 135–145)
WBC # BLD AUTO: 22.7 K/UL (ref 4.8–10.8)

## 2021-02-28 PROCEDURE — 700111 HCHG RX REV CODE 636 W/ 250 OVERRIDE (IP): Performed by: EMERGENCY MEDICINE

## 2021-02-28 PROCEDURE — 85652 RBC SED RATE AUTOMATED: CPT

## 2021-02-28 PROCEDURE — 96375 TX/PRO/DX INJ NEW DRUG ADDON: CPT

## 2021-02-28 PROCEDURE — 85025 COMPLETE CBC W/AUTO DIFF WBC: CPT

## 2021-02-28 PROCEDURE — 83690 ASSAY OF LIPASE: CPT

## 2021-02-28 PROCEDURE — 96374 THER/PROPH/DIAG INJ IV PUSH: CPT

## 2021-02-28 PROCEDURE — 80053 COMPREHEN METABOLIC PANEL: CPT

## 2021-02-28 PROCEDURE — 99285 EMERGENCY DEPT VISIT HI MDM: CPT

## 2021-02-28 RX ORDER — ONDANSETRON 2 MG/ML
4 INJECTION INTRAMUSCULAR; INTRAVENOUS ONCE
Status: COMPLETED | OUTPATIENT
Start: 2021-02-28 | End: 2021-02-28

## 2021-02-28 RX ADMIN — ONDANSETRON 4 MG: 2 INJECTION INTRAMUSCULAR; INTRAVENOUS at 23:04

## 2021-02-28 RX ADMIN — FENTANYL CITRATE 100 MCG: 50 INJECTION, SOLUTION INTRAMUSCULAR; INTRAVENOUS at 23:04

## 2021-02-28 ASSESSMENT — FIBROSIS 4 INDEX: FIB4 SCORE: 0.96

## 2021-03-01 ENCOUNTER — APPOINTMENT (OUTPATIENT)
Dept: RADIOLOGY | Facility: MEDICAL CENTER | Age: 51
DRG: 387 | End: 2021-03-01
Attending: EMERGENCY MEDICINE
Payer: COMMERCIAL

## 2021-03-01 PROBLEM — Z86.73 HISTORY OF CVA (CEREBROVASCULAR ACCIDENT): Status: ACTIVE | Noted: 2021-03-01

## 2021-03-01 PROBLEM — E87.6 HYPOKALEMIA: Status: ACTIVE | Noted: 2021-03-01

## 2021-03-01 PROBLEM — K51.00 PANCOLITIS (HCC): Status: ACTIVE | Noted: 2021-03-01

## 2021-03-01 LAB
ALBUMIN SERPL BCP-MCNC: 4.2 G/DL (ref 3.2–4.9)
ALBUMIN/GLOB SERPL: 1.3 G/DL
ALP SERPL-CCNC: 65 U/L (ref 30–99)
ALT SERPL-CCNC: 38 U/L (ref 2–50)
ANION GAP SERPL CALC-SCNC: 12 MMOL/L (ref 7–16)
APPEARANCE UR: CLEAR
AST SERPL-CCNC: 18 U/L (ref 12–45)
BASOPHILS # BLD AUTO: 0.2 % (ref 0–1.8)
BASOPHILS # BLD: 0.03 K/UL (ref 0–0.12)
BILIRUB SERPL-MCNC: 0.3 MG/DL (ref 0.1–1.5)
BILIRUB UR QL STRIP.AUTO: NEGATIVE
BUN SERPL-MCNC: 17 MG/DL (ref 8–22)
CALCIUM SERPL-MCNC: 9.5 MG/DL (ref 8.5–10.5)
CHLORIDE SERPL-SCNC: 99 MMOL/L (ref 96–112)
CO2 SERPL-SCNC: 24 MMOL/L (ref 20–33)
COLOR UR: NORMAL
CREAT SERPL-MCNC: 1.07 MG/DL (ref 0.5–1.4)
CRP SERPL HS-MCNC: 0.61 MG/DL (ref 0–0.75)
EOSINOPHIL # BLD AUTO: 0.02 K/UL (ref 0–0.51)
EOSINOPHIL NFR BLD: 0.1 % (ref 0–6.9)
ERYTHROCYTE [DISTWIDTH] IN BLOOD BY AUTOMATED COUNT: 43.3 FL (ref 35.9–50)
ERYTHROCYTE [SEDIMENTATION RATE] IN BLOOD BY WESTERGREN METHOD: 5 MM/HOUR (ref 0–20)
GLOBULIN SER CALC-MCNC: 3.2 G/DL (ref 1.9–3.5)
GLUCOSE SERPL-MCNC: 111 MG/DL (ref 65–99)
GLUCOSE UR STRIP.AUTO-MCNC: NEGATIVE MG/DL
HCT VFR BLD AUTO: 38.6 % (ref 42–52)
HCT VFR BLD AUTO: 40.7 % (ref 42–52)
HCT VFR BLD AUTO: 45 % (ref 42–52)
HGB BLD-MCNC: 13.3 G/DL (ref 14–18)
HGB BLD-MCNC: 13.5 G/DL (ref 14–18)
HGB BLD-MCNC: 14.9 G/DL (ref 14–18)
IMM GRANULOCYTES # BLD AUTO: 0.07 K/UL (ref 0–0.11)
IMM GRANULOCYTES NFR BLD AUTO: 0.4 % (ref 0–0.9)
KETONES UR STRIP.AUTO-MCNC: NEGATIVE MG/DL
LACTATE BLD-SCNC: 1 MMOL/L (ref 0.5–2)
LACTATE BLD-SCNC: 1.7 MMOL/L (ref 0.5–2)
LACTATE BLD-SCNC: 2.4 MMOL/L (ref 0.5–2)
LEUKOCYTE ESTERASE UR QL STRIP.AUTO: NEGATIVE
LYMPHOCYTES # BLD AUTO: 1.18 K/UL (ref 1–4.8)
LYMPHOCYTES NFR BLD: 7.2 % (ref 22–41)
MAGNESIUM SERPL-MCNC: 2.4 MG/DL (ref 1.5–2.5)
MCH RBC QN AUTO: 30.7 PG (ref 27–33)
MCHC RBC AUTO-ENTMCNC: 33.1 G/DL (ref 33.7–35.3)
MCV RBC AUTO: 92.8 FL (ref 81.4–97.8)
MICRO URNS: NORMAL
MONOCYTES # BLD AUTO: 1.6 K/UL (ref 0–0.85)
MONOCYTES NFR BLD AUTO: 9.8 % (ref 0–13.4)
NEUTROPHILS # BLD AUTO: 13.42 K/UL (ref 1.82–7.42)
NEUTROPHILS NFR BLD: 82.3 % (ref 44–72)
NITRITE UR QL STRIP.AUTO: NEGATIVE
NRBC # BLD AUTO: 0 K/UL
NRBC BLD-RTO: 0 /100 WBC
PH UR STRIP.AUTO: 5 [PH] (ref 5–8)
PHOSPHATE SERPL-MCNC: 4.2 MG/DL (ref 2.5–4.5)
PLATELET # BLD AUTO: 296 K/UL (ref 164–446)
PMV BLD AUTO: 9.9 FL (ref 9–12.9)
POTASSIUM SERPL-SCNC: 4.4 MMOL/L (ref 3.6–5.5)
PROT SERPL-MCNC: 7.4 G/DL (ref 6–8.2)
PROT UR QL STRIP: NEGATIVE MG/DL
RBC # BLD AUTO: 4.85 M/UL (ref 4.7–6.1)
RBC UR QL AUTO: NEGATIVE
SARS-COV-2 RNA RESP QL NAA+PROBE: NOTDETECTED
SODIUM SERPL-SCNC: 135 MMOL/L (ref 135–145)
SP GR UR STRIP.AUTO: 1.04
SPECIMEN SOURCE: NORMAL
UROBILINOGEN UR STRIP.AUTO-MCNC: 0.2 MG/DL
WBC # BLD AUTO: 16.3 K/UL (ref 4.8–10.8)
WBC STL QL MICRO: NORMAL

## 2021-03-01 PROCEDURE — 700102 HCHG RX REV CODE 250 W/ 637 OVERRIDE(OP): Performed by: HOSPITALIST

## 2021-03-01 PROCEDURE — 700111 HCHG RX REV CODE 636 W/ 250 OVERRIDE (IP): Performed by: STUDENT IN AN ORGANIZED HEALTH CARE EDUCATION/TRAINING PROGRAM

## 2021-03-01 PROCEDURE — 86140 C-REACTIVE PROTEIN: CPT

## 2021-03-01 PROCEDURE — 85018 HEMOGLOBIN: CPT | Mod: 91

## 2021-03-01 PROCEDURE — 96366 THER/PROPH/DIAG IV INF ADDON: CPT

## 2021-03-01 PROCEDURE — 96365 THER/PROPH/DIAG IV INF INIT: CPT

## 2021-03-01 PROCEDURE — 700105 HCHG RX REV CODE 258: Performed by: STUDENT IN AN ORGANIZED HEALTH CARE EDUCATION/TRAINING PROGRAM

## 2021-03-01 PROCEDURE — 74177 CT ABD & PELVIS W/CONTRAST: CPT

## 2021-03-01 PROCEDURE — 700102 HCHG RX REV CODE 250 W/ 637 OVERRIDE(OP): Performed by: STUDENT IN AN ORGANIZED HEALTH CARE EDUCATION/TRAINING PROGRAM

## 2021-03-01 PROCEDURE — 85014 HEMATOCRIT: CPT | Mod: 91

## 2021-03-01 PROCEDURE — A9270 NON-COVERED ITEM OR SERVICE: HCPCS | Performed by: STUDENT IN AN ORGANIZED HEALTH CARE EDUCATION/TRAINING PROGRAM

## 2021-03-01 PROCEDURE — 96375 TX/PRO/DX INJ NEW DRUG ADDON: CPT

## 2021-03-01 PROCEDURE — 89055 LEUKOCYTE ASSESSMENT FECAL: CPT

## 2021-03-01 PROCEDURE — 770001 HCHG ROOM/CARE - MED/SURG/GYN PRIV*

## 2021-03-01 PROCEDURE — 80053 COMPREHEN METABOLIC PANEL: CPT

## 2021-03-01 PROCEDURE — A9270 NON-COVERED ITEM OR SERVICE: HCPCS | Performed by: HOSPITALIST

## 2021-03-01 PROCEDURE — U0003 INFECTIOUS AGENT DETECTION BY NUCLEIC ACID (DNA OR RNA); SEVERE ACUTE RESPIRATORY SYNDROME CORONAVIRUS 2 (SARS-COV-2) (CORONAVIRUS DISEASE [COVID-19]), AMPLIFIED PROBE TECHNIQUE, MAKING USE OF HIGH THROUGHPUT TECHNOLOGIES AS DESCRIBED BY CMS-2020-01-R: HCPCS

## 2021-03-01 PROCEDURE — 700117 HCHG RX CONTRAST REV CODE 255: Performed by: EMERGENCY MEDICINE

## 2021-03-01 PROCEDURE — 83993 ASSAY FOR CALPROTECTIN FECAL: CPT

## 2021-03-01 PROCEDURE — 84100 ASSAY OF PHOSPHORUS: CPT

## 2021-03-01 PROCEDURE — 83605 ASSAY OF LACTIC ACID: CPT

## 2021-03-01 PROCEDURE — 700111 HCHG RX REV CODE 636 W/ 250 OVERRIDE (IP): Performed by: EMERGENCY MEDICINE

## 2021-03-01 PROCEDURE — 83735 ASSAY OF MAGNESIUM: CPT

## 2021-03-01 PROCEDURE — 96372 THER/PROPH/DIAG INJ SC/IM: CPT

## 2021-03-01 PROCEDURE — 36415 COLL VENOUS BLD VENIPUNCTURE: CPT

## 2021-03-01 PROCEDURE — 700101 HCHG RX REV CODE 250: Performed by: STUDENT IN AN ORGANIZED HEALTH CARE EDUCATION/TRAINING PROGRAM

## 2021-03-01 PROCEDURE — 81003 URINALYSIS AUTO W/O SCOPE: CPT

## 2021-03-01 PROCEDURE — 85025 COMPLETE CBC W/AUTO DIFF WBC: CPT

## 2021-03-01 PROCEDURE — 96367 TX/PROPH/DG ADDL SEQ IV INF: CPT

## 2021-03-01 PROCEDURE — U0005 INFEC AGEN DETEC AMPLI PROBE: HCPCS

## 2021-03-01 PROCEDURE — 99220 PR INITIAL OBSERVATION CARE,LEVL III: CPT | Performed by: STUDENT IN AN ORGANIZED HEALTH CARE EDUCATION/TRAINING PROGRAM

## 2021-03-01 RX ORDER — MORPHINE SULFATE 4 MG/ML
4 INJECTION, SOLUTION INTRAMUSCULAR; INTRAVENOUS
Status: DISCONTINUED | OUTPATIENT
Start: 2021-03-01 | End: 2021-03-01

## 2021-03-01 RX ORDER — OMEPRAZOLE 20 MG/1
40 CAPSULE, DELAYED RELEASE ORAL 2 TIMES DAILY
Status: DISCONTINUED | OUTPATIENT
Start: 2021-03-01 | End: 2021-03-02 | Stop reason: HOSPADM

## 2021-03-01 RX ORDER — ACETAMINOPHEN 325 MG/1
650 TABLET ORAL EVERY 6 HOURS PRN
Status: DISCONTINUED | OUTPATIENT
Start: 2021-03-01 | End: 2021-03-02 | Stop reason: HOSPADM

## 2021-03-01 RX ORDER — FERROUS SULFATE 325(65) MG
325 TABLET ORAL
Status: DISCONTINUED | OUTPATIENT
Start: 2021-03-01 | End: 2021-03-02 | Stop reason: HOSPADM

## 2021-03-01 RX ORDER — POLYETHYLENE GLYCOL 3350 17 G/17G
1 POWDER, FOR SOLUTION ORAL
Status: DISCONTINUED | OUTPATIENT
Start: 2021-03-01 | End: 2021-03-02 | Stop reason: HOSPADM

## 2021-03-01 RX ORDER — OXYCODONE HYDROCHLORIDE 10 MG/1
10 TABLET ORAL
Status: DISCONTINUED | OUTPATIENT
Start: 2021-03-01 | End: 2021-03-01

## 2021-03-01 RX ORDER — BUSPIRONE HYDROCHLORIDE 10 MG/1
30 TABLET ORAL 2 TIMES DAILY
Status: DISCONTINUED | OUTPATIENT
Start: 2021-03-01 | End: 2021-03-02 | Stop reason: HOSPADM

## 2021-03-01 RX ORDER — LANOLIN ALCOHOL/MO/W.PET/CERES
1000 CREAM (GRAM) TOPICAL DAILY
COMMUNITY
End: 2021-03-17

## 2021-03-01 RX ORDER — SODIUM CHLORIDE 9 MG/ML
1000 INJECTION, SOLUTION INTRAVENOUS ONCE
Status: COMPLETED | OUTPATIENT
Start: 2021-03-01 | End: 2021-03-01

## 2021-03-01 RX ORDER — MORPHINE SULFATE 4 MG/ML
4 INJECTION, SOLUTION INTRAMUSCULAR; INTRAVENOUS EVERY 6 HOURS PRN
Status: DISCONTINUED | OUTPATIENT
Start: 2021-03-01 | End: 2021-03-02 | Stop reason: HOSPADM

## 2021-03-01 RX ORDER — OXYCODONE HYDROCHLORIDE 10 MG/1
10 TABLET ORAL
Status: DISCONTINUED | OUTPATIENT
Start: 2021-03-01 | End: 2021-03-02 | Stop reason: HOSPADM

## 2021-03-01 RX ORDER — PRAZOSIN HYDROCHLORIDE 2 MG/1
4 CAPSULE ORAL
Status: DISCONTINUED | OUTPATIENT
Start: 2021-03-01 | End: 2021-03-02 | Stop reason: HOSPADM

## 2021-03-01 RX ORDER — METRONIDAZOLE 500 MG/1
500 TABLET ORAL EVERY 8 HOURS
Status: DISCONTINUED | OUTPATIENT
Start: 2021-03-01 | End: 2021-03-02 | Stop reason: HOSPADM

## 2021-03-01 RX ORDER — CYCLOBENZAPRINE HCL 10 MG
10 TABLET ORAL EVERY EVENING
Status: DISCONTINUED | OUTPATIENT
Start: 2021-03-01 | End: 2021-03-02 | Stop reason: HOSPADM

## 2021-03-01 RX ORDER — IPRATROPIUM BROMIDE AND ALBUTEROL SULFATE 2.5; .5 MG/3ML; MG/3ML
3 SOLUTION RESPIRATORY (INHALATION)
Status: DISCONTINUED | OUTPATIENT
Start: 2021-03-01 | End: 2021-03-02 | Stop reason: HOSPADM

## 2021-03-01 RX ORDER — LISINOPRIL 20 MG/1
40 TABLET ORAL DAILY
Refills: 3 | Status: DISCONTINUED | OUTPATIENT
Start: 2021-03-01 | End: 2021-03-02 | Stop reason: HOSPADM

## 2021-03-01 RX ORDER — AMOXICILLIN 250 MG
2 CAPSULE ORAL 2 TIMES DAILY
Status: DISCONTINUED | OUTPATIENT
Start: 2021-03-01 | End: 2021-03-02 | Stop reason: HOSPADM

## 2021-03-01 RX ORDER — CLOPIDOGREL BISULFATE 75 MG/1
75 TABLET ORAL DAILY
Status: DISCONTINUED | OUTPATIENT
Start: 2021-03-01 | End: 2021-03-02

## 2021-03-01 RX ORDER — BUPROPION HYDROCHLORIDE 150 MG/1
150 TABLET, EXTENDED RELEASE ORAL
Status: DISCONTINUED | OUTPATIENT
Start: 2021-03-01 | End: 2021-03-02 | Stop reason: HOSPADM

## 2021-03-01 RX ORDER — OXYCODONE HYDROCHLORIDE 5 MG/1
5 TABLET ORAL
Status: DISCONTINUED | OUTPATIENT
Start: 2021-03-01 | End: 2021-03-01

## 2021-03-01 RX ORDER — POTASSIUM CHLORIDE 20 MEQ/1
40 TABLET, EXTENDED RELEASE ORAL ONCE
Status: COMPLETED | OUTPATIENT
Start: 2021-03-01 | End: 2021-03-01

## 2021-03-01 RX ORDER — FLUOXETINE HYDROCHLORIDE 20 MG/1
80 CAPSULE ORAL DAILY
Refills: 0 | Status: DISCONTINUED | OUTPATIENT
Start: 2021-03-01 | End: 2021-03-02 | Stop reason: HOSPADM

## 2021-03-01 RX ORDER — BISACODYL 10 MG
10 SUPPOSITORY, RECTAL RECTAL
Status: DISCONTINUED | OUTPATIENT
Start: 2021-03-01 | End: 2021-03-02 | Stop reason: HOSPADM

## 2021-03-01 RX ORDER — OXYCODONE HYDROCHLORIDE 5 MG/1
5 TABLET ORAL
Status: DISCONTINUED | OUTPATIENT
Start: 2021-03-01 | End: 2021-03-02 | Stop reason: HOSPADM

## 2021-03-01 RX ORDER — ATORVASTATIN CALCIUM 80 MG/1
80 TABLET, FILM COATED ORAL DAILY
Status: DISCONTINUED | OUTPATIENT
Start: 2021-03-01 | End: 2021-03-02 | Stop reason: HOSPADM

## 2021-03-01 RX ORDER — OMEPRAZOLE 20 MG/1
20 CAPSULE, DELAYED RELEASE ORAL DAILY
Status: DISCONTINUED | OUTPATIENT
Start: 2021-03-01 | End: 2021-03-01

## 2021-03-01 RX ORDER — TAMSULOSIN HYDROCHLORIDE 0.4 MG/1
0.4 CAPSULE ORAL
Status: DISCONTINUED | OUTPATIENT
Start: 2021-03-01 | End: 2021-03-02 | Stop reason: HOSPADM

## 2021-03-01 RX ORDER — MORPHINE SULFATE 4 MG/ML
4 INJECTION, SOLUTION INTRAMUSCULAR; INTRAVENOUS ONCE
Status: COMPLETED | OUTPATIENT
Start: 2021-03-01 | End: 2021-03-01

## 2021-03-01 RX ORDER — CLONAZEPAM 0.5 MG/1
0.5 TABLET ORAL 2 TIMES DAILY PRN
Status: DISCONTINUED | OUTPATIENT
Start: 2021-03-01 | End: 2021-03-02 | Stop reason: HOSPADM

## 2021-03-01 RX ORDER — LABETALOL HYDROCHLORIDE 5 MG/ML
10 INJECTION, SOLUTION INTRAVENOUS EVERY 4 HOURS PRN
Status: DISCONTINUED | OUTPATIENT
Start: 2021-03-01 | End: 2021-03-02 | Stop reason: HOSPADM

## 2021-03-01 RX ADMIN — ENOXAPARIN SODIUM 40 MG: 40 INJECTION SUBCUTANEOUS at 05:37

## 2021-03-01 RX ADMIN — CEFTRIAXONE SODIUM 2 G: 2 INJECTION, POWDER, FOR SOLUTION INTRAMUSCULAR; INTRAVENOUS at 05:36

## 2021-03-01 RX ADMIN — BUSPIRONE HYDROCHLORIDE 30 MG: 10 TABLET ORAL at 05:38

## 2021-03-01 RX ADMIN — CYCLOBENZAPRINE 10 MG: 10 TABLET, FILM COATED ORAL at 17:25

## 2021-03-01 RX ADMIN — BUPROPION HYDROCHLORIDE 150 MG: 150 TABLET, EXTENDED RELEASE ORAL at 09:02

## 2021-03-01 RX ADMIN — BUPROPION HYDROCHLORIDE 150 MG: 150 TABLET, EXTENDED RELEASE ORAL at 21:25

## 2021-03-01 RX ADMIN — METRONIDAZOLE 500 MG: 500 TABLET ORAL at 21:25

## 2021-03-01 RX ADMIN — METRONIDAZOLE 500 MG: 500 INJECTION, SOLUTION INTRAVENOUS at 06:27

## 2021-03-01 RX ADMIN — FERROUS SULFATE TAB 325 MG (65 MG ELEMENTAL FE) 325 MG: 325 (65 FE) TAB at 09:02

## 2021-03-01 RX ADMIN — FLUOXETINE 80 MG: 20 CAPSULE ORAL at 05:37

## 2021-03-01 RX ADMIN — TAMSULOSIN HYDROCHLORIDE 0.4 MG: 0.4 CAPSULE ORAL at 21:25

## 2021-03-01 RX ADMIN — DOCUSATE SODIUM 50 MG AND SENNOSIDES 8.6 MG 1 TABLET: 8.6; 5 TABLET, FILM COATED ORAL at 05:37

## 2021-03-01 RX ADMIN — OMEPRAZOLE 20 MG: 20 CAPSULE, DELAYED RELEASE ORAL at 05:37

## 2021-03-01 RX ADMIN — POTASSIUM CHLORIDE 40 MEQ: 1500 TABLET, EXTENDED RELEASE ORAL at 05:41

## 2021-03-01 RX ADMIN — BUSPIRONE HYDROCHLORIDE 30 MG: 10 TABLET ORAL at 17:25

## 2021-03-01 RX ADMIN — OMEPRAZOLE 40 MG: 20 CAPSULE, DELAYED RELEASE ORAL at 17:26

## 2021-03-01 RX ADMIN — IOHEXOL 100 ML: 350 INJECTION, SOLUTION INTRAVENOUS at 00:12

## 2021-03-01 RX ADMIN — MORPHINE SULFATE 4 MG: 4 INJECTION INTRAVENOUS at 01:02

## 2021-03-01 RX ADMIN — OXYCODONE HYDROCHLORIDE 10 MG: 10 TABLET ORAL at 09:02

## 2021-03-01 RX ADMIN — ATORVASTATIN CALCIUM 80 MG: 80 TABLET, FILM COATED ORAL at 05:38

## 2021-03-01 RX ADMIN — PRAZOSIN HYDROCHLORIDE 4 MG: 2 CAPSULE ORAL at 21:26

## 2021-03-01 RX ADMIN — CLOPIDOGREL BISULFATE 75 MG: 75 TABLET ORAL at 05:39

## 2021-03-01 RX ADMIN — TAMSULOSIN HYDROCHLORIDE 0.4 MG: 0.4 CAPSULE ORAL at 09:02

## 2021-03-01 RX ADMIN — SODIUM CHLORIDE 1000 ML: 9 INJECTION, SOLUTION INTRAVENOUS at 05:36

## 2021-03-01 RX ADMIN — METRONIDAZOLE 500 MG: 500 INJECTION, SOLUTION INTRAVENOUS at 11:44

## 2021-03-01 RX ADMIN — OXYCODONE HYDROCHLORIDE 10 MG: 10 TABLET ORAL at 05:39

## 2021-03-01 RX ADMIN — LISINOPRIL 40 MG: 20 TABLET ORAL at 05:37

## 2021-03-01 ASSESSMENT — ENCOUNTER SYMPTOMS
COUGH: 0
CHILLS: 0
ORTHOPNEA: 0
PHOTOPHOBIA: 0
MYALGIAS: 0
NERVOUS/ANXIOUS: 1
LOSS OF CONSCIOUSNESS: 0
PALPITATIONS: 0
SINUS PAIN: 0
FEVER: 0
DIARRHEA: 1
SEIZURES: 0
FALLS: 0
ABDOMINAL PAIN: 1
WEAKNESS: 0
NAUSEA: 1
DOUBLE VISION: 0
SHORTNESS OF BREATH: 0
VOMITING: 1

## 2021-03-01 ASSESSMENT — PAIN SCALES - WONG BAKER
WONGBAKER_NUMERICALRESPONSE: HURTS JUST A LITTLE BIT

## 2021-03-01 ASSESSMENT — PATIENT HEALTH QUESTIONNAIRE - PHQ9
2. FEELING DOWN, DEPRESSED, IRRITABLE, OR HOPELESS: NOT AT ALL
1. LITTLE INTEREST OR PLEASURE IN DOING THINGS: NOT AT ALL
SUM OF ALL RESPONSES TO PHQ9 QUESTIONS 1 AND 2: 0

## 2021-03-01 ASSESSMENT — FIBROSIS 4 INDEX: FIB4 SCORE: 0.62

## 2021-03-01 ASSESSMENT — LIFESTYLE VARIABLES: DO YOU DRINK ALCOHOL: NO

## 2021-03-01 ASSESSMENT — PAIN DESCRIPTION - PAIN TYPE
TYPE: ACUTE PAIN

## 2021-03-01 NOTE — PROGRESS NOTES
Patient was seen and examined by my colleague after midnight. Please refer to the H&P done by Dr. Preciado on 3/1/2021 for more details.    I personally saw and examined the patient today with his wife on video chat. I answered their questions. He is to continue on a clear liquid diet and advance to full liquids as tolerated. Hemoglobin is stable. He reports h/o diverticulosis on his colonoscopy ~1 year ago with [facility].     Continue with antibiotics and IVF for pancolitis of unclear etiology. Occult stool pending. Holding plavix for now.

## 2021-03-01 NOTE — ASSESSMENT & PLAN NOTE
Blood pressure is currently well controlled  Hydrochlorothiazide will continue to be held at this time

## 2021-03-01 NOTE — H&P
Hospital Medicine History & Physical Note    Date of Service  3/1/2021    Primary Care Physician  Xin Graham M.D.    Consultants  None    Code Status  Full Code    Chief Complaint  Chief Complaint   Patient presents with   • Abdominal Pain   • N/V       History of Presenting Illness  50 y.o. male past medical history of hypertension, hyperlipidemia, prior CVA with some residual word finding deficits, PTSD, panic disorder, anxiety, depression, who presented 2/28/2021 with complaints of severe abdominal pain, nausea and vomiting.    Mr. Silverman was sitting at home this evening when he suddenly developed abdominal pain and cramping that was severe.  Patient ate chili dogs for dinner. location is lower quadrant and epigastric is described as severe and cramping, worse with walking and was associated with nausea and vomiting that was nonbloody nonbilious.  Patient was in his usual state of health prior to the developing abdominal pains, he does not have a history of chronic abdominal pains or any inflammatory bowel conditions.  Denies any headache, chest pain, dyspnea, cough, dysuria.  Patient has a bowel movement every few days, last bowel movement was today he reports as hard and formed followed by a large amount of soft and unformed stool, has been having cramping and flatulence today as well.  Denies any recent fevers or chills, feelings of fatigue or malaise, night sweats or unintentional weight loss.    Blood pressure on arrival was 145/60, heart rate 93 respiratory rate 17 oxygen saturation 95% patient was afebrile.  Initial work-up showed leukocytosis 22.7, sodium 133 potassium 3.5 glucose 173, normal lipase lactic acid 2.4, CT abdomen pelvis with contrast shows changes suggesting pancolitis infectious versus inflammatory, diverticulosis, atherosclerosis and atherosclerotic coronary artery disease, small fat-containing bilateral inguinal hernias small fat-containing umbilical hernia, hepatomegaly.  Patient  was subsequently admitted for evaluation and treatment of his pancolitis.    Review of Systems  Review of Systems   Constitutional: Negative for chills, fever and malaise/fatigue.   HENT: Negative for congestion and sinus pain.    Eyes: Negative for double vision and photophobia.   Respiratory: Negative for cough and shortness of breath.    Cardiovascular: Negative for chest pain, palpitations and orthopnea.   Gastrointestinal: Positive for abdominal pain, diarrhea, nausea and vomiting.   Genitourinary: Negative for dysuria and urgency.   Musculoskeletal: Negative for falls and myalgias.   Neurological: Negative for seizures, loss of consciousness and weakness.   Psychiatric/Behavioral: Negative for suicidal ideas. The patient is nervous/anxious.         Past Medical History   has a past medical history of Anxiety, Arthritis, COPD (chronic obstructive pulmonary disease) ([facility]), Depression, Hyperlipidemia, Hypertension, Panic disorder with agoraphobia and mild panic attacks (4/29/2019), PTSD (post-traumatic stress disorder), and Stroke ([facility]).    Surgical History   has a past surgical history that includes abdominal exploration and appendectomy.     Family History  family history includes Anxiety disorder in his brother; Cancer in his child; Hyperlipidemia in his mother; Hypertension in his father and mother; Lung Disease in his father.     Social History   reports that he quit smoking about a year ago. He has a 19.00 pack-year smoking history. His smokeless tobacco use includes chew. He reports current alcohol use. He reports current drug use. Frequency: 7.00 times per week. Drug: Marijuana.    Allergies  No Known Allergies    Medications  Prior to Admission Medications   Prescriptions Last Dose Informant Patient Reported? Taking?   Cyanocobalamin (VITAMIN B-12) 1000 MCG Tab 2/28/2021 at AM Patient Yes Yes   Sig: Take 1,000 mcg by mouth every day.   REPATHA SURECLICK 140 MG/ML Solution Auto-injector 2/28/2021 at PM  Patient Yes No   Sig: Inject 1 Dose as instructed every 14 days.   albuterol 108 (90 Base) MCG/ACT Aero Soln inhalation aerosol Not Taking at Unknown time Patient No No   Sig: Inhale 2 Puffs by mouth every four hours as needed for Shortness of Breath.   Patient not taking: Reported on 3/1/2021   asa/apap/caffeine (EXCEDRIN) 250-250-65 MG Tab 2021 at AM Patient Yes Yes   Sig: Take 3 Tablets by mouth one time as needed for Headache.   atorvastatin (LIPITOR) 80 MG tablet 2021 at PM Patient Yes No   Sig: Take 80 mg by mouth every day.   buPROPion (WELLBUTRIN XL) 300 MG XL tablet 2021 at AM Patient No No   Sig: TAKE 1 TABLET BY MOUTH EVERY MORNING   busPIRone (BUSPAR) 30 MG tablet 2021 at AM Patient No No   Sig: Take 1 Tab by mouth 2 times a day for 90 days.   clonazePAM (KLONOPIN) 0.5 MG Tab 2021 at AM Patient No No   Sig: Take 1 Tab by mouth 2 times a day as needed for up to 30 days.   clopidogrel (PLAVIX) 75 MG Tab 2021 at AM Patient Yes No   Sig: Take 75 mg by mouth every day.   cyclobenzaprine (FLEXERIL) 10 MG Tab 2021 at PM Patient Yes No   Sig: Take 10 mg by mouth as needed.   fenofibrate (TRICOR) 145 MG Tab 2021 at PM Patient Yes No   Sig: TAKE 1 TABLET BY ORAL ROUTE EVERY DAY BEFORE BED   ferrous sulfate 325 (65 Fe) MG tablet 2021 at AM Patient No No   Sig: TAKE 1 TABLET BY MOUTH TWICE A DAY   fluoxetine (PROZAC) 40 MG capsule 2021 at PM Patient No No   Sig: Take 2 Caps by mouth every day for 90 days.   fluticasone (FLONASE) 50 MCG/ACT nasal spray 2021 at AM Patient No No   Si SPRAY INTO EACH NOSTRIL EVERY DAY.   hydrochlorothiazide (MICROZIDE) 12.5 MG capsule 2021 at AM Patient No No   Sig: TAKE 1 CAPSULE BY MOUTH EVERY DAY   lisinopril (PRINIVIL) 40 MG tablet 2021 at AM Patient No No   Sig: TAKE 1 TABLET BY MOUTH EVERY DAY   loratadine (CLARITIN) 10 MG Tab 2021 at AM Patient Yes No   Sig: Take 10 mg by mouth every day.   multivitamin  (THERAGRAN) Tab 2/28/2021 at AM Patient Yes Yes   Sig: Take 1 tablet by mouth every day.   omeprazole (PRILOSEC) 20 MG delayed-release capsule 2/27/2021 at PM Patient Yes No   Sig: Take 20 mg by mouth every day.   prazosin (MINIPRESS) 2 MG Cap 2/27/2021 at PM Patient No No   Sig: Take 2 Caps by mouth every bedtime for 90 days.   tamsulosin (FLOMAX) 0.4 MG capsule 2/28/2021 at AM Patient Yes No   Sig: Take 0.4 mg by mouth 2 (two) times a day.   urea (CARMOL) 10 % cream >3 days ago at unknown Patient No No   Sig: Use after showers and daily all over to dry skin patches.      Facility-Administered Medications: None       Physical Exam  Temp:  [36 °C (96.8 °F)] 36 °C (96.8 °F)  Pulse:  [] 102  Resp:  [17-19] 19  BP: (144-145)/(68-70) 144/70  SpO2:  [94 %-97 %] 97 %    Physical Exam  Vitals and nursing note reviewed.   Constitutional:       General: He is not in acute distress.     Appearance: He is not toxic-appearing or diaphoretic.      Comments: 50-year-old male appears stated age, mildly uncomfortable secondary to abdominal pain, alert and conversant, able to speak full sentences without becoming breathless   HENT:      Nose: No congestion or rhinorrhea.      Mouth/Throat:      Mouth: Mucous membranes are dry.      Pharynx: Oropharynx is clear. No oropharyngeal exudate.   Eyes:      General: No scleral icterus.     Extraocular Movements: Extraocular movements intact.      Conjunctiva/sclera: Conjunctivae normal.      Pupils: Pupils are equal, round, and reactive to light.   Cardiovascular:      Rate and Rhythm: Regular rhythm. Tachycardia present.      Pulses: Normal pulses.      Heart sounds: No murmur.   Pulmonary:      Effort: Pulmonary effort is normal. No respiratory distress.      Breath sounds: Normal breath sounds. No wheezing or rhonchi.   Abdominal:      General: Bowel sounds are normal. There is no distension.      Palpations: Abdomen is soft.      Tenderness: There is abdominal tenderness  (epigastric and left side abdominal wall tenderness (from fall on table few days ago)). There is no guarding.   Musculoskeletal:         General: No swelling. Normal range of motion.      Cervical back: Normal range of motion and neck supple. No rigidity.      Right lower leg: No edema.      Left lower leg: No edema.   Skin:     General: Skin is warm and dry.      Capillary Refill: Capillary refill takes less than 2 seconds.   Neurological:      General: No focal deficit present.      Mental Status: He is alert and oriented to person, place, and time. Mental status is at baseline.      Cranial Nerves: No cranial nerve deficit.      Sensory: No sensory deficit.      Motor: No weakness.   Psychiatric:         Mood and Affect: Mood normal.         Behavior: Behavior normal.         Thought Content: Thought content normal.         Judgment: Judgment normal.         Laboratory:  Recent Labs     02/28/21  2252   WBC 22.7*   RBC 5.09   HEMOGLOBIN 15.3   HEMATOCRIT 46.3   MCV 91.0   MCH 30.1   MCHC 33.0*   RDW 41.9   PLATELETCT 296   MPV 9.9     Recent Labs     02/28/21  2252   SODIUM 133*   POTASSIUM 3.5*   CHLORIDE 96   CO2 21   GLUCOSE 173*   BUN 21   CREATININE 1.28   CALCIUM 10.2     Recent Labs     02/28/21  2252   ALTSGPT 45   ASTSGOT 24   ALKPHOSPHAT 74   TBILIRUBIN 0.5   LIPASE 19   GLUCOSE 173*         No results for input(s): NTPROBNP in the last 72 hours.      No results for input(s): TROPONINT in the last 72 hours.    Imaging:  [id number] WITH   Final Result         1.  Changes suggesting pancolitis, could represent infectious or inflammatory etiologies.   2.  Diverticulosis   3.  Atherosclerosis and atherosclerotic coronary artery disease.   4.  Small fat-containing bilateral inguinal hernias   5.  Small fat-containing umbilical hernia   6.  Hepatomegaly            Assessment/Plan:  I anticipate this patient is appropriate for observation status at this time.    * Pancolitis (HCC)- (present on  "admission)  Assessment & Plan  -CT shows \"Changes suggesting pancolitis, could represent infectious or inflammatory etiologies\"  -Ceftriaxone and flagyl for 3 days given severe pain, elevated lactic acid  -Initial Lactic Acid 2.4  -No history of inflammatory bowel disease however will check ESR, CRP, fecal calprotectin, stool WBCs  -Patient reports prior colonoscopy and EGD were normal, is unsure of the dates    Panic disorder with agoraphobia and mild panic attacks- (present on admission)  Assessment & Plan  -Resume bupropion, buspirone, fluoxetine, clonazepam  -History of PTSD as well depression    Essential hypertension- (present on admission)  Assessment & Plan  -Systolic blood pressure in the 140s however lactic acid is 2.4 on admission  -Reordered patient's lisinopril and prazosin, held hydrochlorothiazide restart when appropriate      Hypokalemia- (present on admission)  Assessment & Plan  -3.5 on admission  -replete with 40 M EQ's potassium    Dyslipidemia- (present on admission)  Assessment & Plan  -Continue atorvastatin      "

## 2021-03-01 NOTE — ED PROVIDER NOTES
ED Provider Note    CHIEF COMPLAINT  Chief Complaint   Patient presents with   • Abdominal Pain   • N/V       HPI  Luciano Silverman is a 50 y.o. male who presents with abdominal pain.  The patient states the pain started couple hours prior to arrival.  He states the pain is in the lower quadrants and described as cramping.  He states the pains worse with pressure and walking.  He states the pain is 9 out of 10 in intensity.  He denies difficulties with bowel or bladder function.  He has had associated nausea and vomiting.  He has not had any fevers.  He had an appendectomy in the distant past but no other known abdominal surgeries.  He is unaware of any sick contacts.    REVIEW OF SYSTEMS  See HPI for further details. All other systems are negative.     PAST MEDICAL HISTORY  Past Medical History:   Diagnosis Date   • Panic disorder with agoraphobia and mild panic attacks 2019   • Anxiety    • Arthritis    • COPD (chronic obstructive pulmonary disease) (HCC)    • Depression    • Hyperlipidemia    • Hypertension    • PTSD (post-traumatic stress disorder)    • Stroke (HCC)        FAMILY HISTORY  [unfilled]    SOCIAL HISTORY  Social History     Socioeconomic History   • Marital status:      Spouse name: Not on file   • Number of children: Not on file   • Years of education: Not on file   • Highest education level: GED or equivalent   Occupational History   • Not on file   Tobacco Use   • Smoking status: Former Smoker     Packs/day: 0.50     Years: 38.00     Pack years: 19.00     Quit date: 2020     Years since quittin.0   • Smokeless tobacco: Current User     Types: Chew   • Tobacco comment: down 5/cigarrettes a day or less   Substance and Sexual Activity   • Alcohol use: Yes     Comment: socially - usually 1 beer twice a year   • Drug use: Yes     Frequency: 7.0 times per week     Types: Marijuana     Comment: nightly to sleep   • Sexual activity: Not on file   Other Topics Concern   • Not on file  "  Social History Narrative   • Not on file     Social Determinants of Health     Financial Resource Strain: High Risk   • Difficulty of Paying Living Expenses: Very hard   Food Insecurity: Food Insecurity Present   • Worried About Running Out of Food in the Last Year: Sometimes true   • Ran Out of Food in the Last Year: Sometimes true   Transportation Needs: No Transportation Needs   • Lack of Transportation (Medical): No   • Lack of Transportation (Non-Medical): No   Physical Activity: Inactive   • Days of Exercise per Week: 0 days   • Minutes of Exercise per Session: 0 min   Stress: Stress Concern Present   • Feeling of Stress : Very much   Social Connections: Moderately Isolated   • Frequency of Communication with Friends and Family: Once a week   • Frequency of Social Gatherings with Friends and Family: Never   • Attends [employer] Services: Never   • Active Member of Clubs or Organizations: No   • Attends Club or Organization Meetings: Never   • Marital Status:    Intimate Partner Violence:    • Fear of Current or Ex-Partner:    • Emotionally Abused:    • Physically Abused:    • Sexually Abused:        SURGICAL HISTORY  Past Surgical History:   Procedure Laterality Date   • ABDOMINAL EXPLORATION     • APPENDECTOMY         CURRENT MEDICATIONS  Home Medications    **Home medications have not yet been reviewed for this encounter**         ALLERGIES  No Known Allergies    PHYSICAL EXAM  VITAL SIGNS: /68   Pulse 93   Temp 36 °C (96.8 °F) (Temporal)   Resp 12   Ht 1.753 m (5' 9\")   Wt 90.7 kg (200 lb)   SpO2 95%   BMI 29.53 kg/m²       Constitutional: Moderate acute distress, Non-toxic appearance.   HENT: Normocephalic, Atraumatic, Bilateral external ears normal, Oropharynx moist, No oral exudates, Nose normal.   Eyes: PERRLA, EOMI, Conjunctiva normal, No discharge.   Neck: Normal range of motion, No tenderness, Supple, No stridor.   Lymphatic: No lymphadenopathy noted.   Cardiovascular: Normal " heart rate, Normal rhythm, No murmurs, No rubs, No gallops.   Thorax & Lungs: Normal breath sounds, No respiratory distress, No wheezing, No chest tenderness.   Abdomen: Hypoactive bowel sounds, Soft, diffuse tenderness, No masses, No pulsatile masses.   Skin: Warm, Dry, No erythema, No rash.   Back: No tenderness, No CVA tenderness.   Extremities: Intact distal pulses, No edema, No tenderness, No cyanosis, No clubbing.   Neurologic: Alert & oriented x 3, Normal motor function, Normal sensory function, No focal deficits noted.   Psychiatric: Affect normal, Judgment normal, Mood normal.     Results for orders placed or performed during the hospital encounter of 02/28/21   CBC WITH DIFFERENTIAL   Result Value Ref Range    WBC 22.7 (H) 4.8 - 10.8 K/uL    RBC 5.09 4.70 - 6.10 M/uL    Hemoglobin 15.3 14.0 - 18.0 g/dL    Hematocrit 46.3 42.0 - 52.0 %    MCV 91.0 81.4 - 97.8 fL    MCH 30.1 27.0 - 33.0 pg    MCHC 33.0 (L) 33.7 - 35.3 g/dL    RDW 41.9 35.9 - 50.0 fL    Platelet Count 296 164 - 446 K/uL    MPV 9.9 9.0 - 12.9 fL    Neutrophils-Polys 88.40 (H) 44.00 - 72.00 %    Lymphocytes 3.00 (L) 22.00 - 41.00 %    Monocytes 7.50 0.00 - 13.40 %    Eosinophils 0.10 0.00 - 6.90 %    Basophils 0.20 0.00 - 1.80 %    Immature Granulocytes 0.80 0.00 - 0.90 %    Nucleated RBC 0.00 /100 WBC    Neutrophils (Absolute) 20.03 (H) 1.82 - 7.42 K/uL    Lymphs (Absolute) 0.68 (L) 1.00 - 4.80 K/uL    Monos (Absolute) 1.70 (H) 0.00 - 0.85 K/uL    Eos (Absolute) 0.03 0.00 - 0.51 K/uL    Baso (Absolute) 0.05 0.00 - 0.12 K/uL    Immature Granulocytes (abs) 0.18 (H) 0.00 - 0.11 K/uL    NRBC (Absolute) 0.00 K/uL   COMP METABOLIC PANEL   Result Value Ref Range    Sodium 133 (L) 135 - 145 mmol/L    Potassium 3.5 (L) 3.6 - 5.5 mmol/L    Chloride 96 96 - 112 mmol/L    Co2 21 20 - 33 mmol/L    Anion Gap 16.0 7.0 - 16.0    Glucose 173 (H) 65 - 99 mg/dL    Bun 21 8 - 22 mg/dL    Creatinine 1.28 0.50 - 1.40 mg/dL    Calcium 10.2 8.5 - 10.5 mg/dL     AST(SGOT) 24 12 - 45 U/L    ALT(SGPT) 45 2 - 50 U/L    Alkaline Phosphatase 74 30 - 99 U/L    Total Bilirubin 0.5 0.1 - 1.5 mg/dL    Albumin 4.6 3.2 - 4.9 g/dL    Total Protein 7.9 6.0 - 8.2 g/dL    Globulin 3.3 1.9 - 3.5 g/dL    A-G Ratio 1.4 g/dL   LIPASE   Result Value Ref Range    Lipase 19 11 - 82 U/L   ESTIMATED GFR   Result Value Ref Range    GFR If African American >60 >60 mL/min/1.73 m 2    GFR If Non African American 59 (A) >60 mL/min/1.73 m 2       RADIOLOGY/PROCEDURES  CT-ABDOMEN-PELVIS WITH   Final Result         1.  Changes suggesting pancolitis, could represent infectious or inflammatory etiologies.   2.  Diverticulosis   3.  Atherosclerosis and atherosclerotic coronary artery disease.   4.  Small fat-containing bilateral inguinal hernias   5.  Small fat-containing umbilical hernia   6.  Hepatomegaly            COURSE & MEDICAL DECISION MAKING  Pertinent Labs & Imaging studies reviewed. (See chart for details)  This a 50-year-old gentleman who presents with abdominal discomfort.  On arrival he did have significant tenderness and is worried about a surgical process.  CT scan does not show any evidence of a obstructive process and the patient is already had an appendectomy.  He does have significant pancolitis and I suspect this is the source.  He does have a very concerning leukocytosis and I suspect this is from the pancolitis.  He has not had any diarrhea to support a viral source.  He has had some vomiting.  He had minimal relief with intravenous fentanyl and therefore we will switch the patient to morphine.  Will admit the patient to the hospital for further work-up to determine the source of the pancolitis as well as for pain control.  The patient does not appear septic.    FINAL IMPRESSION  1.  Pancolitis    Disposition  The patient will be admitted in stable condition      Electronically signed by: Jayce Kenyon M.D., 2/28/2021 10:59 PM

## 2021-03-01 NOTE — PROGRESS NOTES
Utilization Management Note  I was asked by [facility] Case Management to review this case and provide an opinion on the necessity of inpatient care  I have performed an independent review of the chart and have spoken with the attending hospitalist physician Dr. Carlin  Briefly, Mr Silverman was admitted for symptoms of abdominal pain and diagnosed with pancolitis by CT scan, he has been treated appropriately with fluid resuscitation and IV antibiotics.  He has been cautiously advanced to clear liquid diet but would not be able to maintain appropriate hydration without ongoing fluids  Dr. Carlin indicates that she expect the patient to remain in the hospital for greater than two midnights.    Recommend inpatient status for this patient who is expected to stay in the hospital for greater than two midnights.  He is at risk of complications including progression to overt sepsis, colonic perforation and even death if this condition is not treated urgently.  This care could not be rendered to an outpatient

## 2021-03-01 NOTE — ED NOTES
Med rec complete per interview with pt at bedside and reconcile outside meds. Allergies reviewed. Pt denies abx use in the past 14 days.

## 2021-03-01 NOTE — PROGRESS NOTES
Dr Carlin is at bedside, discussing plan of care with pt and wife (on the phone), questions are being answer.

## 2021-03-01 NOTE — RESPIRATORY CARE
COPD EDUCATION by COPD CLINICAL EDUCATOR  3/1/2021  at  9:59 AM by Denise Costa, RRT     Patient interviewed by COPD education team. Short intervention has been conducted. HE states he quit smoking cigarettes/ cigars, Admits to chewing occasionally. He denies a diagnosis of COPD but states he has MARIA ISABEL and has a sleep machine.  He had a sleep workup in another area. Our comprehensive packet including information about what is COPD, treatments and smoking cessation provided and reviewed with patient. He has a rescue inhaler that he rarely uses and declined a spacer.    Smoking Cessation Intervention and education completed, 4 minutes spent on smoking cessation education with patient.

## 2021-03-01 NOTE — DISCHARGE PLANNING
Anticipated Discharge Disposition:   Home    Action:   Discussed discharge planning needs during rounds. Per MD, pt not medically cleared at this time, pending labs, monitor.    Barriers to Discharge:   Medical clearance    Plan:   Hospital Care Management will continue to follow and assist with discharge planning needs.

## 2021-03-01 NOTE — ED TRIAGE NOTES
"Luciano Fan Silverman  50 y.o. male  Chief Complaint   Patient presents with   • Abdominal Pain   • N/V     Patient brought in by EMS from home. Patient complaining of lower abdominal pain that started at approximately at 1800 while resting. Patient states \"It feels like I need to take a poop\". Patient attempted to go to the bathroom, but reports only gas. Patient's last bowel movement was 3 days ago. Abdomen slightly distended, semi-firm, tender upon palpation, active bowel sounds. Patient had one episode of emesis prior to arrival per EMS.   "

## 2021-03-01 NOTE — ASSESSMENT & PLAN NOTE
"-CT shows \"Changes suggesting pancolitis, could represent infectious or inflammatory etiologies\"  -Initial Lactic Acid 2.4 and downtrending  -No history of inflammatory bowel disease however will check ESR, CRP, fecal calprotectin, stool WBCs  -Patient reports prior colonoscopy and EGD were normal, is unsure of the dates.  Was told that he has diverticulosis.  -He has been transitioned to oral antibiotic therapy with Bactrim and metronidazole  "

## 2021-03-01 NOTE — ED NOTES
Medicated per MAR. Patient unable to void at this time, provided urinal at bedside, pending urine sample.

## 2021-03-02 VITALS
SYSTOLIC BLOOD PRESSURE: 122 MMHG | OXYGEN SATURATION: 94 % | TEMPERATURE: 98.1 F | DIASTOLIC BLOOD PRESSURE: 59 MMHG | RESPIRATION RATE: 18 BRPM | HEART RATE: 81 BPM | WEIGHT: 206.35 LBS | BODY MASS INDEX: 30.56 KG/M2 | HEIGHT: 69 IN

## 2021-03-02 PROBLEM — E87.6 HYPOKALEMIA: Status: RESOLVED | Noted: 2021-03-01 | Resolved: 2021-03-02

## 2021-03-02 LAB
ANION GAP SERPL CALC-SCNC: 11 MMOL/L (ref 7–16)
BASOPHILS # BLD AUTO: 0.2 % (ref 0–1.8)
BASOPHILS # BLD: 0.02 K/UL (ref 0–0.12)
BUN SERPL-MCNC: 14 MG/DL (ref 8–22)
CALCIUM SERPL-MCNC: 9 MG/DL (ref 8.5–10.5)
CHLORIDE SERPL-SCNC: 100 MMOL/L (ref 96–112)
CO2 SERPL-SCNC: 21 MMOL/L (ref 20–33)
CREAT SERPL-MCNC: 0.95 MG/DL (ref 0.5–1.4)
EOSINOPHIL # BLD AUTO: 0.04 K/UL (ref 0–0.51)
EOSINOPHIL NFR BLD: 0.4 % (ref 0–6.9)
ERYTHROCYTE [DISTWIDTH] IN BLOOD BY AUTOMATED COUNT: 43.1 FL (ref 35.9–50)
GLUCOSE SERPL-MCNC: 111 MG/DL (ref 65–99)
HCT VFR BLD AUTO: 38.8 % (ref 42–52)
HEMOCCULT STL QL: POSITIVE
HGB BLD-MCNC: 13 G/DL (ref 14–18)
IMM GRANULOCYTES # BLD AUTO: 0.04 K/UL (ref 0–0.11)
IMM GRANULOCYTES NFR BLD AUTO: 0.4 % (ref 0–0.9)
LYMPHOCYTES # BLD AUTO: 1.16 K/UL (ref 1–4.8)
LYMPHOCYTES NFR BLD: 11.8 % (ref 22–41)
MCH RBC QN AUTO: 30.9 PG (ref 27–33)
MCHC RBC AUTO-ENTMCNC: 33.5 G/DL (ref 33.7–35.3)
MCV RBC AUTO: 92.2 FL (ref 81.4–97.8)
MONOCYTES # BLD AUTO: 0.94 K/UL (ref 0–0.85)
MONOCYTES NFR BLD AUTO: 9.6 % (ref 0–13.4)
NEUTROPHILS # BLD AUTO: 7.61 K/UL (ref 1.82–7.42)
NEUTROPHILS NFR BLD: 77.6 % (ref 44–72)
NRBC # BLD AUTO: 0 K/UL
NRBC BLD-RTO: 0 /100 WBC
PLATELET # BLD AUTO: 212 K/UL (ref 164–446)
PMV BLD AUTO: 10 FL (ref 9–12.9)
POTASSIUM SERPL-SCNC: 3.6 MMOL/L (ref 3.6–5.5)
RBC # BLD AUTO: 4.21 M/UL (ref 4.7–6.1)
SODIUM SERPL-SCNC: 132 MMOL/L (ref 135–145)
WBC # BLD AUTO: 9.8 K/UL (ref 4.8–10.8)

## 2021-03-02 PROCEDURE — 85025 COMPLETE CBC W/AUTO DIFF WBC: CPT

## 2021-03-02 PROCEDURE — 80048 BASIC METABOLIC PNL TOTAL CA: CPT

## 2021-03-02 PROCEDURE — 36415 COLL VENOUS BLD VENIPUNCTURE: CPT

## 2021-03-02 PROCEDURE — 700105 HCHG RX REV CODE 258: Performed by: STUDENT IN AN ORGANIZED HEALTH CARE EDUCATION/TRAINING PROGRAM

## 2021-03-02 PROCEDURE — 99239 HOSP IP/OBS DSCHRG MGMT >30: CPT | Performed by: INTERNAL MEDICINE

## 2021-03-02 PROCEDURE — 82272 OCCULT BLD FECES 1-3 TESTS: CPT

## 2021-03-02 PROCEDURE — A9270 NON-COVERED ITEM OR SERVICE: HCPCS | Performed by: INTERNAL MEDICINE

## 2021-03-02 PROCEDURE — 700102 HCHG RX REV CODE 250 W/ 637 OVERRIDE(OP): Performed by: INTERNAL MEDICINE

## 2021-03-02 PROCEDURE — 700102 HCHG RX REV CODE 250 W/ 637 OVERRIDE(OP): Performed by: STUDENT IN AN ORGANIZED HEALTH CARE EDUCATION/TRAINING PROGRAM

## 2021-03-02 PROCEDURE — 700102 HCHG RX REV CODE 250 W/ 637 OVERRIDE(OP): Performed by: HOSPITALIST

## 2021-03-02 PROCEDURE — 700111 HCHG RX REV CODE 636 W/ 250 OVERRIDE (IP): Performed by: STUDENT IN AN ORGANIZED HEALTH CARE EDUCATION/TRAINING PROGRAM

## 2021-03-02 PROCEDURE — A9270 NON-COVERED ITEM OR SERVICE: HCPCS | Performed by: STUDENT IN AN ORGANIZED HEALTH CARE EDUCATION/TRAINING PROGRAM

## 2021-03-02 PROCEDURE — A9270 NON-COVERED ITEM OR SERVICE: HCPCS | Performed by: HOSPITALIST

## 2021-03-02 RX ORDER — CLOPIDOGREL BISULFATE 75 MG/1
75 TABLET ORAL DAILY
Status: DISCONTINUED | OUTPATIENT
Start: 2021-03-02 | End: 2021-03-02 | Stop reason: HOSPADM

## 2021-03-02 RX ORDER — METRONIDAZOLE 500 MG/1
500 TABLET ORAL EVERY 8 HOURS
Qty: 24 TABLET | Refills: 0 | Status: SHIPPED | OUTPATIENT
Start: 2021-03-02 | End: 2021-03-10

## 2021-03-02 RX ORDER — SULFAMETHOXAZOLE AND TRIMETHOPRIM 800; 160 MG/1; MG/1
1 TABLET ORAL EVERY 12 HOURS
Qty: 16 TABLET | Refills: 0 | Status: SHIPPED | OUTPATIENT
Start: 2021-03-02 | End: 2021-03-10

## 2021-03-02 RX ORDER — SULFAMETHOXAZOLE AND TRIMETHOPRIM 800; 160 MG/1; MG/1
1 TABLET ORAL EVERY 12 HOURS
Status: DISCONTINUED | OUTPATIENT
Start: 2021-03-02 | End: 2021-03-02 | Stop reason: HOSPADM

## 2021-03-02 RX ADMIN — CEFTRIAXONE SODIUM 2 G: 2 INJECTION, POWDER, FOR SOLUTION INTRAMUSCULAR; INTRAVENOUS at 02:28

## 2021-03-02 RX ADMIN — FLUOXETINE 80 MG: 20 CAPSULE ORAL at 06:14

## 2021-03-02 RX ADMIN — CLOPIDOGREL BISULFATE 75 MG: 75 TABLET ORAL at 12:50

## 2021-03-02 RX ADMIN — ENOXAPARIN SODIUM 40 MG: 40 INJECTION SUBCUTANEOUS at 06:14

## 2021-03-02 RX ADMIN — METRONIDAZOLE 500 MG: 500 TABLET ORAL at 14:56

## 2021-03-02 RX ADMIN — LISINOPRIL 40 MG: 20 TABLET ORAL at 06:15

## 2021-03-02 RX ADMIN — BUPROPION HYDROCHLORIDE 150 MG: 150 TABLET, EXTENDED RELEASE ORAL at 08:14

## 2021-03-02 RX ADMIN — OMEPRAZOLE 40 MG: 20 CAPSULE, DELAYED RELEASE ORAL at 06:13

## 2021-03-02 RX ADMIN — METRONIDAZOLE 500 MG: 500 TABLET ORAL at 06:15

## 2021-03-02 RX ADMIN — BUSPIRONE HYDROCHLORIDE 30 MG: 10 TABLET ORAL at 06:13

## 2021-03-02 RX ADMIN — TAMSULOSIN HYDROCHLORIDE 0.4 MG: 0.4 CAPSULE ORAL at 08:14

## 2021-03-02 RX ADMIN — FERROUS SULFATE TAB 325 MG (65 MG ELEMENTAL FE) 325 MG: 325 (65 FE) TAB at 08:14

## 2021-03-02 ASSESSMENT — ENCOUNTER SYMPTOMS
CHILLS: 0
BLURRED VISION: 0
DIZZINESS: 0
PALPITATIONS: 0
WHEEZING: 0
SHORTNESS OF BREATH: 0
EYE DISCHARGE: 0
DIARRHEA: 0
ABDOMINAL PAIN: 0
FEVER: 0
HEMOPTYSIS: 0
FLANK PAIN: 0
ORTHOPNEA: 0
HEARTBURN: 0
BLOOD IN STOOL: 0
MYALGIAS: 0
VOMITING: 0
DOUBLE VISION: 0
COUGH: 0
PHOTOPHOBIA: 0
CLAUDICATION: 0
SPUTUM PRODUCTION: 0
BACK PAIN: 0
SENSORY CHANGE: 0
BRUISES/BLEEDS EASILY: 0
WEAKNESS: 0
SPEECH CHANGE: 0
SORE THROAT: 0
NAUSEA: 0
DEPRESSION: 0
HEADACHES: 0

## 2021-03-02 ASSESSMENT — COGNITIVE AND FUNCTIONAL STATUS - GENERAL
DAILY ACTIVITIY SCORE: 24
SUGGESTED CMS G CODE MODIFIER DAILY ACTIVITY: CH
MOBILITY SCORE: 23
SUGGESTED CMS G CODE MODIFIER MOBILITY: CI
CLIMB 3 TO 5 STEPS WITH RAILING: A LITTLE

## 2021-03-02 ASSESSMENT — PATIENT HEALTH QUESTIONNAIRE - PHQ9
1. LITTLE INTEREST OR PLEASURE IN DOING THINGS: NOT AT ALL
2. FEELING DOWN, DEPRESSED, IRRITABLE, OR HOPELESS: NOT AT ALL
SUM OF ALL RESPONSES TO PHQ9 QUESTIONS 1 AND 2: 0

## 2021-03-02 NOTE — DISCHARGE INSTRUCTIONS
Advance diet as tolerated  Be compliant with new medications including Bactrim and metronidazole to complete therapy for pancolitis for the next 8 days.  Follow-up with your primary care physician in 1 week  Follow-up with GI for possible repeat colonoscopy in 10 to 14 days.    Discharge Instructions    Discharged to home by car with relative. Discharged via wheelchair, hospital escort: Yes.  Special equipment needed: Not Applicable    Be sure to schedule a follow-up appointment with your primary care doctor or any specialists as instructed.     Discharge Plan:        I understand that a diet low in cholesterol, fat, and sodium is recommended for good health. Unless I have been given specific instructions below for another diet, I accept this instruction as my diet prescription.   Other diet: Regular Diet    Special Instructions: None    · Is patient discharged on Warfarin / Coumadin? No     Metronidazole tablets or capsules  What is this medicine?  METRONIDAZOLE (me troe NI da zole) is an antiinfective. It is used to treat certain kinds of bacterial and protozoal infections. It will not work for colds, flu, or other viral infections.  This medicine may be used for other purposes; ask your health care provider or pharmacist if you have questions.  COMMON BRAND NAME(S): Flagyl  What should I tell my health care provider before I take this medicine?  They need to know if you have any of these conditions:  · Cockayne syndrome  · history of blood diseases, like sickle cell anemia or leukemia  · history of yeast infection  · if you often drink alcohol  · liver disease  · an unusual or allergic reaction to metronidazole, nitroimidazoles, or other medicines, foods, dyes, or preservatives  · pregnant or trying to get pregnant  · breast-feeding  How should I use this medicine?  Take this medicine by mouth with a full glass of water. Follow the directions on the prescription label. Take your medicine at regular intervals. Do  not take your medicine more often than directed. Take all of your medicine as directed even if you think you are better. Do not skip doses or stop your medicine early.  Talk to your pediatrician regarding the use of this medicine in children. Special care may be needed.  Overdosage: If you think you have taken too much of this medicine contact a poison control center or emergency room at once.  NOTE: This medicine is only for you. Do not share this medicine with others.  What if I miss a dose?  If you miss a dose, take it as soon as you can. If it is almost time for your next dose, take only that dose. Do not take double or extra doses.  What may interact with this medicine?  Do not take this medicine with any of the following medications:  · alcohol or any product that contains alcohol  · cisapride  · disulfiram  · dronedarone  · pimozide  · thioridazine  This medicine may also interact with the following medications:  · amiodarone  · birth control pills  · busulfan  · carbamazepine  · cimetidine  · cyclosporine  · fluorouracil  · lithium  · other medicines that prolong the QT interval (cause an abnormal heart rhythm) like dofetilide, ziprasidone  · phenobarbital  · phenytoin  · quinidine  · tacrolimus  · vecuronium  · warfarin  This list may not describe all possible interactions. Give your health care provider a list of all the medicines, herbs, non-prescription drugs, or dietary supplements you use. Also tell them if you smoke, drink alcohol, or use illegal drugs. Some items may interact with your medicine.  What should I watch for while using this medicine?  Tell your doctor or health care professional if your symptoms do not improve or if they get worse.  You may get drowsy or dizzy. Do not drive, use machinery, or do anything that needs mental alertness until you know how this medicine affects you. Do not stand or sit up quickly, especially if you are an older patient. This reduces the risk of dizzy or  fainting spells.  Ask your doctor or health care professional if you should avoid alcohol. Many nonprescription cough and cold products contain alcohol. Metronidazole can cause an unpleasant reaction when taken with alcohol. The reaction includes flushing, headache, nausea, vomiting, sweating, and increased thirst. The reaction can last from 30 minutes to several hours.  If you are being treated for a sexually transmitted disease, avoid sexual contact until you have finished your treatment. Your sexual partner may also need treatment.  What side effects may I notice from receiving this medicine?  Side effects that you should report to your doctor or health care professional as soon as possible:  · allergic reactions like skin rash or hives, swelling of the face, lips, or tongue  · confusion  · fast, irregular heartbeat  · fever, chills, sore throat  · fever with rash, swollen lymph nodes, or swelling of the face  · pain, tingling, numbness in the hands or feet  · redness, blistering, peeling or loosening of the skin, including inside the mouth  · seizures  · sign and symptoms of liver injury like dark yellow or brown urine; general ill feeling or flu-like symptoms; light colored stools; loss of appetite; nausea; right upper belly pain; unusually weak or tired; yellowing of the eyes or skin  · vaginal discharge, itching, or odor in women  Side effects that usually do not require medical attention (report to your doctor or health care professional if they continue or are bothersome):  · changes in taste  · diarrhea  · headache  · nausea, vomiting  · stomach pain  This list may not describe all possible side effects. Call your doctor for medical advice about side effects. You may report side effects to FDA at 8-169-FDA-4919.  Where should I keep my medicine?  Keep out of the reach of children.  Store at room temperature below 25 degrees C (77 degrees F). Protect from light. Keep container tightly closed. Throw away any  unused medicine after the expiration date.  NOTE: This sheet is a summary. It may not cover all possible information. If you have questions about this medicine, talk to your doctor, pharmacist, or health care provider.  © 2020 [employer]vier/Gold Standard (2019-12-10 06:52:33)      Depression / Suicide Risk    As you are discharged from this [facility] facility, it is important to learn how to keep safe from harming yourself.    Recognize the warning signs:  · Abrupt changes in personality, positive or negative- including increase in energy   · Giving away possessions  · Change in eating patterns- significant weight changes-  positive or negative  · Change in sleeping patterns- unable to sleep or sleeping all the time   · Unwillingness or inability to communicate  · Depression  · Unusual sadness, discouragement and loneliness  · Talk of wanting to die  · Neglect of personal appearance   · Rebelliousness- reckless behavior  · Withdrawal from people/activities they love  · Confusion- inability to concentrate     If you or a loved one observes any of these behaviors or has concerns about self-harm, here's what you can do:  · Talk about it- your feelings and reasons for harming yourself  · Remove any means that you might use to hurt yourself (examples: pills, rope, extension cords, firearm)  · Get professional help from the community (Mental Health, Substance Abuse, psychological counseling)  · Do not be alone:Call your Safe Contact- someone whom you trust who will be there for you.  · Call your local CRISIS HOTLINE 589-3079 or 389-997-7921  · Call your local Children's Mobile Crisis Response Team [place] (893) 148-7708 or www.[employer]  · Call the toll free National Suicide Prevention Hotlines   · National Suicide Prevention Lifeline 443-561-PMMJ (7741)  · National Hope Line Network 800-SUICIDE (995-8145)

## 2021-03-02 NOTE — DISCHARGE PLANNING
Care Transition Team Assessment    This RN RUKHSANA spoke to pt's wife via phone, verified facesheet and obtained the following information. Pt lives in a one story house with his wife. Pt denies use of home oxygen. Pt uses a cane, other wise was independent with ADLs and IADLs prior to hospitalization. Pt was able to drive to appointments. Pt has insurance coverage. Pt has a PCP. Pt's preferred pharmacy is [facility].          Information Source  Information Given By: Spouse  Informant's Name: LAI MARQUEZ    Readmission Evaluation  Is this a readmission?: No    Elopement Risk  Legal Hold: No  Ambulatory or Self Mobile in Wheelchair: Yes  Disoriented: No  Psychiatric Symptoms: None  History of Wandering: No  Elopement this Admit: No  Vocalizing Wanting to Leave: No  Displays Behaviors, Body Language Wanting to Leave: No-Not at Risk for Elopement  Elopement Risk: Not at Risk for Elopement    Interdisciplinary Discharge Planning  Primary Care Physician: AUGUSTIN JEFFRIES M.D.  Lives with - Patient's Self Care Capacity: Spouse  Support Systems: Spouse / Significant Other  Housing / Facility: 1 Story House(with 3 steps)  Prior Services: None  Assistance Needed: Unknown at this Time  Durable Medical Equipment: Not Applicable, Other - Specify(cane)    Discharge Preparedness  What is your plan after discharge?: Uncertain - pending medical team collaboration  What are your discharge supports?: Spouse  Prior Functional Level: Uses Cane, Independent with Activities of Daily Living    Functional Assesment  Prior Functional Level: Uses Cane, Independent with Activities of Daily Living    Finances  Financial Barriers to Discharge: No  Prescription Coverage: Yes       Advance Directive  Advance Directive?: None  Advance Directive offered?: AD Booklet refused    Domestic Abuse  Have you ever been the victim of abuse or violence?: No    Psychological Assessment  History of Substance Abuse: None  History of Psychiatric Problems:  Yes(depression)    Discharge Risks or Barriers  Discharge risks or barriers?: Complex medical needs  Patient risk factors: Complex medical needs    Anticipated Discharge Information  Discharge Disposition: Still a Patient (30)

## 2021-03-02 NOTE — DISCHARGE SUMMARY
Discharge Summary    CHIEF COMPLAINT ON ADMISSION  Chief Complaint   Patient presents with   • Abdominal Pain   • N/V       Reason for Admission  General Illness     Admission Date  2/28/2021    CODE STATUS  Full Code    HPI & HOSPITAL COURSE    The patient is a 50-year-old male with a past medical history of hypertension, dyslipidemia, prior CVA, PTSD, anxiety/depression who presents to the ED to the ED with chief complaint of abdominal pain and nausea and vomiting.  He states that his symptoms occurred shortly after eating chili dogs.  States that no one else at home had similar complaints.  He has had previous colonoscopies which show extensive diverticuli.  CT scan done in the ED shows evidence of infectious versus inflammatory pancolitis.  He was started on IV antibiotics and volume resuscitation which he continues to tolerate.       3/2/2021: The patient was seen and evaluated at bedside and states that he is tolerating oral intake.  He is having regular bowel movements.  FOBT done in the ED was negative.  Lactic acid continues to trend downwards.  Abdominal pain has significantly improved.  He denies any associated chest pains, palpitations, shortness of breath.  He has been transitioned to oral antibiotic therapy.  No other overnight events reported by nursing staff.    Therefore, he is discharged in good and stable condition to home with close outpatient follow-up.    The patient met 2-midnight criteria for an inpatient stay at the time of discharge.    Discharge Date  3/2/21    FOLLOW UP ITEMS POST DISCHARGE  Follow up with GI in 10-14 days  Follow up with PCP in 7 days  Be compliant with antibiotics for next 8 days ( bactrim and metronidazole)    DISCHARGE DIAGNOSES  Principal Problem:    Pancolitis (HCC) POA: Yes  Active Problems:    Essential hypertension POA: Yes    Panic disorder with agoraphobia and mild panic attacks POA: Yes    Dyslipidemia POA: Yes  Resolved Problems:    Hypokalemia POA:  Yes      FOLLOW UP  Future Appointments   Date Time Provider Department Center   3/29/2021  2:00 PM Jaime Aponte M.D. [facility] None   3/31/2021  9:30 AM Harvinder E. Pride, PT, DPT [facility]   4/2/2021 10:00 AM Harvinder E. Pride, PT, DPT [facility]   4/6/2021 10:30 AM Xin Graham M.D. [facility]   4/7/2021 10:00 AM Harvinder E. Pride, PT, DPT [facility]   4/9/2021 10:00 AM Harvinder E. Pride, PT, DPT [facility]   4/14/2021 10:00 AM Harvinder E. Pride, PT, DPT [facility]   4/16/2021 10:00 AM Harvinder E. Pride, PT, DPT [facility]     Xin Graham M.D.  [address]  [facility] [address]  [facility] [address]  [phone]            MEDICATIONS ON DISCHARGE     Medication List      START taking these medications      Instructions   metroNIDAZOLE 500 MG Tabs  Commonly known as: FLAGYL   Take 1 tablet by mouth every 8 hours for 8 days.  Dose: 500 mg     sulfamethoxazole-trimethoprim 800-160 MG tablet  Commonly known as: BACTRIM DS   Take 1 tablet by mouth every 12 hours for 8 days.  Dose: 1 tablet        CONTINUE taking these medications      Instructions   albuterol 108 (90 Base) MCG/ACT Aers inhalation aerosol   Inhale 2 Puffs by mouth every four hours as needed for Shortness of Breath.  Dose: 2 Puff     asa/apap/caffeine 250-250-65 MG Tabs  Commonly known as: EXCEDRIN   Take 3 Tablets by mouth one time as needed for Headache.  Dose: 3 tablet     atorvastatin 80 MG tablet  Commonly known as: LIPITOR   Take 80 mg by mouth every day.  Dose: 80 mg     buPROPion 300 MG XL tablet  Commonly known as: WELLBUTRIN XL   TAKE 1 TABLET BY MOUTH EVERY MORNING     busPIRone 30 MG tablet  Commonly known as: BUSPAR   Take 1 Tab by mouth 2 times a day for 90 days.  Dose: 30 mg     clonazePAM 0.5 MG Tabs  Commonly known as: KLONOPIN   Take 1 Tab by mouth 2 times a day as needed for up to 30 days.  Dose: 0.5 mg     clopidogrel 75 MG Tabs  Commonly known as: PLAVIX   Take 75 mg by mouth every day.  Dose: 75 mg      cyclobenzaprine 10 mg Tabs  Commonly known as: Flexeril   Take 10 mg by mouth as needed.  Dose: 10 mg     fenofibrate 145 MG Tabs  Commonly known as: TRICOR   TAKE 1 TABLET BY ORAL ROUTE EVERY DAY BEFORE BED     ferrous sulfate 325 (65 Fe) MG tablet   TAKE 1 TABLET BY MOUTH TWICE A DAY     fluoxetine 40 MG capsule  Commonly known as: PROZAC   Take 2 Caps by mouth every day for 90 days.  Dose: 80 mg     fluticasone 50 MCG/ACT nasal spray  Commonly known as: FLONASE   1 SPRAY INTO EACH NOSTRIL EVERY DAY.     hydrochlorothiazide 12.5 MG capsule  Commonly known as: MICROZIDE   TAKE 1 CAPSULE BY MOUTH EVERY DAY     lisinopril 40 MG tablet  Commonly known as: PRINIVIL   TAKE 1 TABLET BY MOUTH EVERY DAY     loratadine 10 MG Tabs  Commonly known as: CLARITIN   Take 10 mg by mouth every day.  Dose: 10 mg     multivitamin Tabs   Take 1 tablet by mouth every day.  Dose: 1 tablet     omeprazole 20 MG delayed-release capsule  Commonly known as: PRILOSEC   Take 20 mg by mouth every day.  Dose: 20 mg     prazosin 2 MG Caps  Commonly known as: MINIPRESS   Take 2 Caps by mouth every bedtime for 90 days.  Dose: 4 mg     Repatha SureClick 140 MG/ML Soaj  Generic drug: Evolocumab (REPATHA)   Inject 1 Dose as instructed every 14 days.  Dose: 1 Dose     tamsulosin 0.4 MG capsule  Commonly known as: FLOMAX   Take 0.4 mg by mouth 2 (two) times a day.  Dose: 0.4 mg     urea 10 % cream  Commonly known as: CARMOL   Use after showers and daily all over to dry skin patches.     vitamin B-12 1000 MCG Tabs   Take 1,000 mcg by mouth every day.  Dose: 1,000 mcg            Allergies  No Known Allergies    DIET  Orders Placed This Encounter   Procedures   • Diet Order Diet: Regular     Standing Status:   Standing     Number of Occurrences:   1     Order Specific Question:   Diet:     Answer:   Regular [1]       ACTIVITY  As tolerated.  Weight bearing as tolerated    CONSULTATIONS  none    PROCEDURES  none    LABORATORY  Lab Results   Component  Value Date    SODIUM 132 (L) 03/02/2021    POTASSIUM 3.6 03/02/2021    CHLORIDE 100 03/02/2021    CO2 21 03/02/2021    GLUCOSE 111 (H) 03/02/2021    BUN 14 03/02/2021    CREATININE 0.95 03/02/2021        Lab Results   Component Value Date    WBC 9.8 03/02/2021    HEMOGLOBIN 13.0 (L) 03/02/2021    HEMATOCRIT 38.8 (L) 03/02/2021    PLATELETCT 212 03/02/2021        Total time of the discharge process exceeds 35 minutes.

## 2021-03-02 NOTE — DISCHARGE PLANNING
Anticipated Discharge Disposition:   Home    Action:   Discussed discharge planning needs during rounds. Per MD, plan to change to PO antibiotic. Per chart review, pt is on room air, able to mobilize without assistance required.    Barriers to Discharge:   Medical clearance    Plan:   Hospital Care Management will continue to follow and assist with discharge planning needs.

## 2021-03-02 NOTE — CARE PLAN
Problem: Safety  Goal: Will remain free from falls  Outcome: PROGRESSING AS EXPECTED  Patient using call light appropriately.      Problem: Infection  Goal: Will remain free from infection  Outcome: PROGRESSING AS EXPECTED  Monitoring for s/sx of infection

## 2021-03-03 LAB — CALPROTECTIN STL-MCNT: 950 UG/G

## 2021-03-12 ENCOUNTER — HOSPITAL ENCOUNTER (OUTPATIENT)
Dept: LAB | Facility: MEDICAL CENTER | Age: 51
End: 2021-03-12
Attending: INTERNAL MEDICINE
Payer: COMMERCIAL

## 2021-03-12 ENCOUNTER — HOSPITAL ENCOUNTER (OUTPATIENT)
Dept: RADIOLOGY | Facility: MEDICAL CENTER | Age: 51
End: 2021-03-12
Attending: INTERNAL MEDICINE
Payer: COMMERCIAL

## 2021-03-12 DIAGNOSIS — R73.03 PREDIABETES: ICD-10-CM

## 2021-03-12 DIAGNOSIS — C61 PROSTATE CANCER (HCC): ICD-10-CM

## 2021-03-12 DIAGNOSIS — R74.8 ELEVATED LIVER ENZYMES: ICD-10-CM

## 2021-03-12 DIAGNOSIS — M54.2 NECK PAIN: ICD-10-CM

## 2021-03-12 DIAGNOSIS — D50.0 IRON DEFICIENCY ANEMIA DUE TO CHRONIC BLOOD LOSS: ICD-10-CM

## 2021-03-12 DIAGNOSIS — M54.6 THORACIC SPINE PAIN: ICD-10-CM

## 2021-03-12 LAB
ALBUMIN SERPL BCP-MCNC: 4.6 G/DL (ref 3.2–4.9)
ALBUMIN/GLOB SERPL: 1.5 G/DL
ALP SERPL-CCNC: 51 U/L (ref 30–99)
ALT SERPL-CCNC: 91 U/L (ref 2–50)
ANION GAP SERPL CALC-SCNC: 12 MMOL/L (ref 7–16)
AST SERPL-CCNC: 51 U/L (ref 12–45)
BILIRUB SERPL-MCNC: 0.4 MG/DL (ref 0.1–1.5)
BUN SERPL-MCNC: 15 MG/DL (ref 8–22)
CALCIUM SERPL-MCNC: 9.6 MG/DL (ref 8.5–10.5)
CHLORIDE SERPL-SCNC: 92 MMOL/L (ref 96–112)
CO2 SERPL-SCNC: 25 MMOL/L (ref 20–33)
CREAT SERPL-MCNC: 0.99 MG/DL (ref 0.5–1.4)
ERYTHROCYTE [DISTWIDTH] IN BLOOD BY AUTOMATED COUNT: 43.4 FL (ref 35.9–50)
EST. AVERAGE GLUCOSE BLD GHB EST-MCNC: 120 MG/DL
FASTING STATUS PATIENT QL REPORTED: NORMAL
FERRITIN SERPL-MCNC: 441 NG/ML (ref 22–322)
GLOBULIN SER CALC-MCNC: 3.1 G/DL (ref 1.9–3.5)
GLUCOSE SERPL-MCNC: 148 MG/DL (ref 65–99)
HBA1C MFR BLD: 5.8 % (ref 4–5.6)
HCT VFR BLD AUTO: 42.2 % (ref 42–52)
HGB BLD-MCNC: 14.1 G/DL (ref 14–18)
IRON SATN MFR SERPL: 34 % (ref 15–55)
IRON SERPL-MCNC: 116 UG/DL (ref 50–180)
MCH RBC QN AUTO: 31.1 PG (ref 27–33)
MCHC RBC AUTO-ENTMCNC: 33.4 G/DL (ref 33.7–35.3)
MCV RBC AUTO: 93 FL (ref 81.4–97.8)
PLATELET # BLD AUTO: 242 K/UL (ref 164–446)
PMV BLD AUTO: 10.2 FL (ref 9–12.9)
POTASSIUM SERPL-SCNC: 4.2 MMOL/L (ref 3.6–5.5)
PROT SERPL-MCNC: 7.7 G/DL (ref 6–8.2)
RBC # BLD AUTO: 4.54 M/UL (ref 4.7–6.1)
SODIUM SERPL-SCNC: 129 MMOL/L (ref 135–145)
TIBC SERPL-MCNC: 344 UG/DL (ref 250–450)
UIBC SERPL-MCNC: 228 UG/DL (ref 110–370)
WBC # BLD AUTO: 4.8 K/UL (ref 4.8–10.8)

## 2021-03-12 PROCEDURE — 36415 COLL VENOUS BLD VENIPUNCTURE: CPT

## 2021-03-12 PROCEDURE — 80053 COMPREHEN METABOLIC PANEL: CPT

## 2021-03-12 PROCEDURE — 83036 HEMOGLOBIN GLYCOSYLATED A1C: CPT

## 2021-03-12 PROCEDURE — 85027 COMPLETE CBC AUTOMATED: CPT

## 2021-03-12 PROCEDURE — 72040 X-RAY EXAM NECK SPINE 2-3 VW: CPT

## 2021-03-12 PROCEDURE — 83550 IRON BINDING TEST: CPT

## 2021-03-12 PROCEDURE — 82728 ASSAY OF FERRITIN: CPT

## 2021-03-12 PROCEDURE — 83540 ASSAY OF IRON: CPT

## 2021-03-12 PROCEDURE — 72072 X-RAY EXAM THORAC SPINE 3VWS: CPT

## 2021-03-17 RX ORDER — OMEGA-3/DHA/EPA/FISH OIL 35-113.5MG
TABLET,CHEWABLE ORAL
Qty: 90 TABLET | Refills: 3 | Status: SHIPPED | OUTPATIENT
Start: 2021-03-17 | End: 2021-04-14

## 2021-03-29 ENCOUNTER — OFFICE VISIT (OUTPATIENT)
Dept: PHYSICAL MEDICINE AND REHAB | Facility: MEDICAL CENTER | Age: 51
End: 2021-03-29
Payer: COMMERCIAL

## 2021-03-29 VITALS
HEART RATE: 83 BPM | OXYGEN SATURATION: 94 % | WEIGHT: 209.22 LBS | HEIGHT: 69 IN | TEMPERATURE: 97.3 F | DIASTOLIC BLOOD PRESSURE: 60 MMHG | SYSTOLIC BLOOD PRESSURE: 98 MMHG | BODY MASS INDEX: 30.99 KG/M2

## 2021-03-29 DIAGNOSIS — G56.03 BILATERAL CARPAL TUNNEL SYNDROME: ICD-10-CM

## 2021-03-29 DIAGNOSIS — G89.29 CHRONIC BILATERAL THORACIC BACK PAIN: ICD-10-CM

## 2021-03-29 DIAGNOSIS — M54.2 CHRONIC NECK PAIN: ICD-10-CM

## 2021-03-29 DIAGNOSIS — G89.29 CHRONIC NECK PAIN: ICD-10-CM

## 2021-03-29 DIAGNOSIS — M54.6 CHRONIC BILATERAL THORACIC BACK PAIN: ICD-10-CM

## 2021-03-29 DIAGNOSIS — M65.331 TRIGGER FINGER, RIGHT MIDDLE FINGER: ICD-10-CM

## 2021-03-29 DIAGNOSIS — M65.321 TRIGGER INDEX FINGER OF RIGHT HAND: ICD-10-CM

## 2021-03-29 PROCEDURE — 99205 OFFICE O/P NEW HI 60 MIN: CPT | Performed by: PHYSICAL MEDICINE & REHABILITATION

## 2021-03-29 ASSESSMENT — PATIENT HEALTH QUESTIONNAIRE - PHQ9
SUM OF ALL RESPONSES TO PHQ QUESTIONS 1-9: 19
5. POOR APPETITE OR OVEREATING: 1 - SEVERAL DAYS
CLINICAL INTERPRETATION OF PHQ2 SCORE: 4

## 2021-03-29 ASSESSMENT — FIBROSIS 4 INDEX: FIB4 SCORE: 1.13

## 2021-03-29 ASSESSMENT — PAIN SCALES - GENERAL: PAINLEVEL: 7=MODERATE-SEVERE PAIN

## 2021-03-29 NOTE — PROGRESS NOTES
New patient note    Physiatry (physical medicine and  Rehabilitation), interventional spine and sports medicine    Date of service: See epic    Chief complaint:   Chief Complaint   Patient presents with   • New Patient     Back pain        Referring provider: Xin Graham M.D.     HISTORY    HPI: Luciano Silverman 50 y.o.  who presents today with Diagnoses of Bilateral carpal tunnel syndrome, Trigger index finger of right hand, Trigger finger, right middle finger, Chronic neck pain, and Chronic bilateral thoracic back pain were pertinent to this visit.    HPI    Chronic neck and thoracic pain.  It is difficult to tell if these are radiating pains but they seem to be more axial.  He also has a separate issue with numbness tingling and weakness in the bilateral hands including dropping objects.  He reportedly had an EMG done in the past showing carpal tunnel syndrome.  These pains are 7/10 in intensity constant.     He has had an epidural and nerve block in the past but he is unsure of thse.     Medications tried include gabapentin, klonopin, flexeril, morphine, oxycodone, marijuana, NSAIDs.     History of alochol and methamphetamine abuse. He reports clean and sober now besided the marijuana.      He denies abdominal pain.     Medical records review:  I also reviewed the notes from the patient's PCP Dr. Xin Graham.  The patient was seen for PTSD and major depressive disorder and anxiety.  He was referred to psychology and psychiatry for these.  History of prostate cancer with screening done.  The patient follows up with urology.  Chronic neck pain, thoracic pain and bilateral hand numbness.  Reportedly an EMG was done through Dr. Candelaria which was consistent with bilateral carpal tunnel syndrome.    I reviewed the notes from the patient's admission and discharge when he was discharged from the hospital on 3/2/2021.  I reviewed the discharge summary from Dr. Paulo Yates.  The patient was seen for abdominal pain,  nausea and vomiting.  By the time of discharge the patient was stable, reportedly abdominal pain improved significantly and he was discharged.      ROS:   Red Flags ROS:   Fever, Chills, Sweats: Denies  Involuntary Weight Loss: Denies  Bladder Incontinence: Denies  Bowel Incontinence: denies  Saddle Anesthesia: Denies    All other systems reviewed and negative.       PMHx:   Past Medical History:   Diagnosis Date   • Anxiety    • Arthritis    • COPD (chronic obstructive pulmonary disease) (Formerly [facility])    • Depression    • Hyperlipidemia    • Hypertension    • Panic disorder with agoraphobia and mild panic attacks 4/29/2019   • PTSD (post-traumatic stress disorder)    • Stroke (Formerly [facility])          Current Outpatient Medications on File Prior to Visit   Medication Sig Dispense Refill   • multivitamin (THERAGRAN) Tab Take 1 tablet by mouth every day.     • asa/apap/caffeine (EXCEDRIN) 250-250-65 MG Tab Take 3 Tablets by mouth one time as needed for Headache.     • tamsulosin (FLOMAX) 0.4 MG capsule Take 0.4 mg by mouth 2 (two) times a day.     • buPROPion (WELLBUTRIN XL) 300 MG XL tablet TAKE 1 TABLET BY MOUTH EVERY MORNING 90 Tab 0   • fluoxetine (PROZAC) 40 MG capsule Take 2 Caps by mouth every day for 90 days. 180 Cap 0   • busPIRone (BUSPAR) 30 MG tablet Take 1 Tab by mouth 2 times a day for 90 days. 180 Tab 0   • prazosin (MINIPRESS) 2 MG Cap Take 2 Caps by mouth every bedtime for 90 days. 180 Cap 0   • hydrochlorothiazide (MICROZIDE) 12.5 MG capsule TAKE 1 CAPSULE BY MOUTH EVERY DAY 30 Cap 3   • lisinopril (PRINIVIL) 40 MG tablet TAKE 1 TABLET BY MOUTH EVERY DAY 90 Tab 3   • fluticasone (FLONASE) 50 MCG/ACT nasal spray 1 SPRAY INTO EACH NOSTRIL EVERY DAY. 48 mL 2   • REPATHA SURECLICK 140 MG/ML Solution Auto-injector Inject 1 Dose as instructed every 14 days.     • urea (CARMOL) 10 % cream Use after showers and daily all over to dry skin patches. 2 Tube 3   • cyclobenzaprine (FLEXERIL) 10 MG Tab Take 10 mg by mouth as needed.   1   • loratadine (CLARITIN) 10 MG Tab Take 10 mg by mouth every day.     • atorvastatin (LIPITOR) 80 MG tablet Take 80 mg by mouth every day.  11   • fenofibrate (TRICOR) 145 MG Tab TAKE 1 TABLET BY ORAL ROUTE EVERY DAY BEFORE BED  5   • omeprazole (PRILOSEC) 20 MG delayed-release capsule Take 20 mg by mouth every day.     • [employer] VITAMIN B12 1000 MCG Tab TAKE 1 TABLET BY MOUTH EVERY DAY 90 tablet 3   • ferrous sulfate 325 (65 Fe) MG tablet TAKE 1 TABLET BY MOUTH TWICE A  Tab 2   • albuterol 108 (90 Base) MCG/ACT Aero Soln inhalation aerosol Inhale 2 Puffs by mouth every four hours as needed for Shortness of Breath. (Patient not taking: Reported on 3/1/2021) 1 Inhaler 0     No current facility-administered medications on file prior to visit.        PSHx:   Past Surgical History:   Procedure Laterality Date   • ABDOMINAL EXPLORATION     • APPENDECTOMY         Family history   Family History   Problem Relation Age of Onset   • Hypertension Mother    • Hyperlipidemia Mother    • Hypertension Father    • Lung Disease Father    • Cancer Child         non hodkins lymphoma   • Anxiety disorder Brother          Medications: reviewed on epic.   Outpatient Medications Marked as Taking for the 3/29/21 encounter (Office Visit) with Jaime Aponte M.D.   Medication Sig Dispense Refill   • multivitamin (THERAGRAN) Tab Take 1 tablet by mouth every day.     • asa/apap/caffeine (EXCEDRIN) 250-250-65 MG Tab Take 3 Tablets by mouth one time as needed for Headache.     • tamsulosin (FLOMAX) 0.4 MG capsule Take 0.4 mg by mouth 2 (two) times a day.     • buPROPion (WELLBUTRIN XL) 300 MG XL tablet TAKE 1 TABLET BY MOUTH EVERY MORNING 90 Tab 0   • fluoxetine (PROZAC) 40 MG capsule Take 2 Caps by mouth every day for 90 days. 180 Cap 0   • busPIRone (BUSPAR) 30 MG tablet Take 1 Tab by mouth 2 times a day for 90 days. 180 Tab 0   • prazosin (MINIPRESS) 2 MG Cap Take 2 Caps by mouth every bedtime for 90 days. 180 Cap 0   •  hydrochlorothiazide (MICROZIDE) 12.5 MG capsule TAKE 1 CAPSULE BY MOUTH EVERY DAY 30 Cap 3   • lisinopril (PRINIVIL) 40 MG tablet TAKE 1 TABLET BY MOUTH EVERY DAY 90 Tab 3   • fluticasone (FLONASE) 50 MCG/ACT nasal spray 1 SPRAY INTO EACH NOSTRIL EVERY DAY. 48 mL 2   • REPATHA SURECLICK 140 MG/ML Solution Auto-injector Inject 1 Dose as instructed every 14 days.     • urea (CARMOL) 10 % cream Use after showers and daily all over to dry skin patches. 2 Tube 3   • cyclobenzaprine (FLEXERIL) 10 MG Tab Take 10 mg by mouth as needed.  1   • loratadine (CLARITIN) 10 MG Tab Take 10 mg by mouth every day.     • atorvastatin (LIPITOR) 80 MG tablet Take 80 mg by mouth every day.  11   • fenofibrate (TRICOR) 145 MG Tab TAKE 1 TABLET BY ORAL ROUTE EVERY DAY BEFORE BED  5   • omeprazole (PRILOSEC) 20 MG delayed-release capsule Take 20 mg by mouth every day.          Allergies:   No Known Allergies    Social Hx:   Social History     Socioeconomic History   • Marital status:      Spouse name: Not on file   • Number of children: Not on file   • Years of education: Not on file   • Highest education level: GED or equivalent   Occupational History   • Not on file   Tobacco Use   • Smoking status: Former Smoker     Packs/day: 0.50     Years: 38.00     Pack years: 19.00     Quit date: 2020     Years since quittin.1   • Smokeless tobacco: Current User     Types: Chew   • Tobacco comment: down 5/cigarrettes a day or less   Substance and Sexual Activity   • Alcohol use: Yes     Comment: socially - usually 1 beer twice a year   • Drug use: Yes     Frequency: 7.0 times per week     Types: Marijuana     Comment: nightly to sleep   • Sexual activity: Not on file   Other Topics Concern   •  Service No   • Blood Transfusions No   • Caffeine Concern No   • Occupational Exposure No   • Hobby Hazards No   • Sleep Concern Yes   • Stress Concern Yes   • Weight Concern No   • Special Diet No   • Back Care No   • Exercise Yes   •  "Bike Helmet No   • Seat Belt Yes   • Self-Exams No   Social History Narrative   • Not on file     Social Determinants of Health     Financial Resource Strain: High Risk   • Difficulty of Paying Living Expenses: Very hard   Food Insecurity: Food Insecurity Present   • Worried About Running Out of Food in the Last Year: Sometimes true   • Ran Out of Food in the Last Year: Sometimes true   Transportation Needs: No Transportation Needs   • Lack of Transportation (Medical): No   • Lack of Transportation (Non-Medical): No   Physical Activity: Inactive   • Days of Exercise per Week: 0 days   • Minutes of Exercise per Session: 0 min   Stress: Stress Concern Present   • Feeling of Stress : Very much   Social Connections: Moderately Isolated   • Frequency of Communication with Friends and Family: Once a week   • Frequency of Social Gatherings with Friends and Family: Never   • Attends Baptism Services: Never   • Active Member of Clubs or Organizations: No   • Attends Club or Organization Meetings: Never   • Marital Status:    Intimate Partner Violence:    • Fear of Current or Ex-Partner:    • Emotionally Abused:    • Physically Abused:    • Sexually Abused:          EXAMINATION     Physical Exam:   Vitals: BP (!) 98/60 (BP Location: Left arm, Patient Position: Sitting, BP Cuff Size: Adult)   Pulse 83   Temp 36.3 °C (97.3 °F) (Temporal)   Ht 1.753 m (5' 9\")   Wt 94.9 kg (209 lb 3.5 oz)   SpO2 94%     Constitutional:   Body Habitus: Body mass index is 30.9 kg/m².  Cooperation: Fully cooperates with exam  Appearance: Well-groomed, well-nourished, not disheveled     Eyes: No scleral icterus to suggest severe liver disease, no proptosis to suggest severe hyperthyroid    ENT -no obvious auditory deficits, no obvious tongue lesions, tongue midline, no facial droop     Skin -no rashes or lesions noted     Respiratory-  breathing comfortable on room air, no audible wheezing    Cardiovascular- capillary refills less than " 2 seconds. No lower extremity edema is noted.     Gastrointestinal - no obvious abdominal masses, No tenderness to palpation in the abdomen    Psychiatric- alert and oriented ×3. Normal affect.     Musculoskeletal and Neuro -   Bilateral hands:   Inspection: No swelling,  Deformities or rashes. Symmetric appearing thenar and hyperthenar regions bilaterally.  Palpation no significant tenderness to palpation throughout the bilateral hands  Range of motion is within normal limits throughout bilateral hands, fingers and wrist.  Special tests:  Tinel's at the wrist over the median nerve positive bilaterally  Carpal tunnel compression: positive bilaterally  Phalen's test: positive bilaterally  Finkelstein's test: negative bilaterally  CMC grind test negative bilaterally  Triggering of the right pointer and middle fingers which did not require the other hand to reduce the triggering    Cervical spine   Inspection: No deformities of the skin over the cervical spine. No rashes or lesions.    full   active range of motion in all directions, with  pain      Spurling’s sign: negative bilaterally    No signs of muscular atrophy in bilateral upper extremities     No tenderness to palpation of the cervical spine      Key points for the international standards for neurological classification of spinal cord injury (ISNCSCI) to light touch.     Dermatome R L   C4 2 2   C5 2 2   C6 2 2   C7 2 2   C8 2 2   T1 2 2   T2 2 2                                     Motor Exam Upper Extremities   ? Myotome R L   Shoulder flexion C5 5 5   Elbow flexion C5 5 5   Wrist extension C6 5 5   Elbow extension C7 5 5   Finger flexion C8 5 5   Finger abduction T1 5 5     Reflexes  ?  R L   Biceps  2+ 2+   Brachioradialis  2+ 2+     Gómez’s sign negative bilaterally              MEDICAL DECISION MAKING    Medical records review: see under HPI section.     DATA    Labs:   Lab Results   Component Value Date/Time    SODIUM 129 (L) 03/12/2021 09:59 AM     POTASSIUM 4.2 03/12/2021 09:59 AM    CHLORIDE 92 (L) 03/12/2021 09:59 AM    CO2 25 03/12/2021 09:59 AM    ANION 12.0 03/12/2021 09:59 AM    GLUCOSE 148 (H) 03/12/2021 09:59 AM    BUN 15 03/12/2021 09:59 AM    CREATININE 0.99 03/12/2021 09:59 AM    CALCIUM 9.6 03/12/2021 09:59 AM    ASTSGOT 51 (H) 03/12/2021 09:59 AM    ALTSGPT 91 (H) 03/12/2021 09:59 AM    TBILIRUBIN 0.4 03/12/2021 09:59 AM    ALBUMIN 4.6 03/12/2021 09:59 AM    ALBUMIN 4.58 04/24/2019 08:03 AM    TOTPROTEIN 7.7 03/12/2021 09:59 AM    TOTPROTEIN 7.50 04/24/2019 08:03 AM    GLOBULIN 3.1 03/12/2021 09:59 AM    AGRATIO 1.5 03/12/2021 09:59 AM   ]    No results found for: PROTHROMBTM, INR     Lab Results   Component Value Date/Time    WBC 4.8 03/12/2021 09:59 AM    RBC 4.54 (L) 03/12/2021 09:59 AM    HEMOGLOBIN 14.1 03/12/2021 09:59 AM    HEMATOCRIT 42.2 03/12/2021 09:59 AM    MCV 93.0 03/12/2021 09:59 AM    MCH 31.1 03/12/2021 09:59 AM    MCHC 33.4 (L) 03/12/2021 09:59 AM    MPV 10.2 03/12/2021 09:59 AM    NEUTSPOLYS 77.60 (H) 03/02/2021 02:27 AM    LYMPHOCYTES 11.80 (L) 03/02/2021 02:27 AM    MONOCYTES 9.60 03/02/2021 02:27 AM    EOSINOPHILS 0.40 03/02/2021 02:27 AM    BASOPHILS 0.20 03/02/2021 02:27 AM        Lab Results   Component Value Date/Time    HBA1C 5.8 (H) 03/12/2021 09:59 AM        Imaging:   I personally reviewed following images, these are my reads  X-ray cervical spine 3/12/2021 with mild degenerative changes at C5-6 and no acute changes.    IMAGING radiology reads. I reviewed the following radiology reads                                                Results for orders placed during the hospital encounter of 03/12/21   DX-CERVICAL SPINE-2 OR 3 VIEWS    Impression Negative cervical spine series.          Results for orders placed during the hospital encounter of 05/28/20   DX-CHEST-2 VIEWS    Impression No radiographic evidence of acute cardiopulmonary process.                                                     Results for orders placed  during the hospital encounter of 03/12/21   DX-THORACIC SPINE-WITH SWIMMERS VIEW    Impression Unremarkable thoracic spine.      Results for orders placed in visit on 10/10/17   DX-THORACIC SPINE-2 VIEWS                   Diagnosis   Visit Diagnoses     ICD-10-CM   1. Bilateral carpal tunnel syndrome  G56.03   2. Trigger index finger of right hand  M65.321   3. Trigger finger, right middle finger  M65.331   4. Chronic neck pain  M54.2    G89.29   5. Chronic bilateral thoracic back pain  M54.6    G89.29           ASSESSMENT AND PLAN:  Luciano Silverman 50 y.o. male      Luciano was seen today for new patient.    Diagnoses and all orders for this visit:    Bilateral carpal tunnel syndrome  -     REFERRAL TO OUTPATIENT INTERVENTIONAL PAIN CLINIC    Trigger index finger of right hand    Trigger finger, right middle finger    Chronic neck pain    Chronic bilateral thoracic back pain      For the patient's hand pain there are no signs of cervical radiculopathy and I believe this is secondary to bilateral carpal tunnel syndrome.  We discussed using neutral with splints nightly.  I would consider the patient for a carpal tunnel injection at the next visit if he fails conservative treatments.    I recommend continuing with physical therapy as previously prescribed for his neck and thoracic back pain.  There were no signs of fracture or neurological compromise      Outside records requested:  The patient signed outside records request form for his outside records including outside images. This includes the records from [facility] neurosurgery group and Dr Candelaria of neurology    A total of 60 minutes were spent on this patient on the day of the encounter including records review, imaging review, lab review counseling and care coordination    Follow-up: 4/19/2021           Please note that this dictation was created using voice recognition software. I have made every reasonable attempt to correct obvious errors but there may be errors of  grammar and content that I may have overlooked prior to finalization of this note.      Jaime Aponte MD  Physical Medicine and Rehabilitation  Interventional Spine and Sports Physiatry  [facility]           Xin Stout M.D.

## 2021-03-31 ENCOUNTER — PHYSICAL THERAPY (OUTPATIENT)
Dept: PHYSICAL THERAPY | Facility: REHABILITATION | Age: 51
End: 2021-03-31
Attending: INTERNAL MEDICINE
Payer: COMMERCIAL

## 2021-03-31 DIAGNOSIS — M54.2 NECK PAIN: ICD-10-CM

## 2021-03-31 DIAGNOSIS — M54.6 THORACIC SPINE PAIN: ICD-10-CM

## 2021-03-31 PROCEDURE — 97161 PT EVAL LOW COMPLEX 20 MIN: CPT

## 2021-03-31 PROCEDURE — 97110 THERAPEUTIC EXERCISES: CPT

## 2021-03-31 SDOH — ECONOMIC STABILITY: GENERAL: QUALITY OF LIFE: FAIR

## 2021-03-31 ASSESSMENT — ENCOUNTER SYMPTOMS
PAIN SCALE: 7
PAIN SCALE AT HIGHEST: 10
PAIN SCALE AT LOWEST: 4

## 2021-03-31 NOTE — OP THERAPY EVALUATION
Outpatient Physical Therapy  INITIAL EVALUATION    [facility] Physical Therapy [place]  [address]  [place] [address]  Phone:  [phone]  Fax:  [phone]    Date of Evaluation: 2021    Patient: Luciano Silverman  YOB: 1970  MRN: [id number]     Referring Provider: Xin Graham M.D.  [id number]  [address]  [place] [address]   Referring Diagnosis Thoracic spine pain [M54.6];Neck pain [M54.2]     Time Calculation    Start time: 930  Stop time: 1030 Time Calculation (min): 60 minutes         Chief Complaint: Neck Problem and Back Problem    Visit Diagnoses     ICD-10-CM   1. Thoracic spine pain  M54.6   2. Neck pain  M54.2       No data found  Subjective:   History of Present Illness:     Date of onset:  2021  Quality of life:  Fair  Prior level of function:  Ongoing lower back pain but new onset thoracic and neck pain  Pain:     Current pain ratin    At best pain ratin    At worst pain rating:  10  Diagnostic Tests:     X-ray: normal    Activities of Daily Living:     Patient reported ADL status: Limited ability to don footwear  Limited ability to flex  Limited ability to move arms  Difficulty with transfers due to LE pain  Patient Goals:     Patient goals for therapy:  Decreased pain, increased motion and increased strength    Patient is a 50 y.o. male that presents to therapy with thoracic and neck pain (notes long standing lower back pain). States that symptoms were insidious in onset, but notes multiple injuries throughout life. Reports the pain quality to be sharp/dull/throbbing, constant and are primarily central spine with head aches. Reports that symptoms now worsening. States that aggravating factors are bending, standing, reaching. States that easng factors are stretching. Denies red flags. Treatment for prostate cancer.   Past Medical History:   Diagnosis Date   • Anxiety    • Arthritis    • COPD (chronic obstructive pulmonary disease)  (HCC)    • Depression    • Hyperlipidemia    • Hypertension    • Panic disorder with agoraphobia and mild panic attacks 2019   • PTSD (post-traumatic stress disorder)    • Stroke (HCC)      Past Surgical History:   Procedure Laterality Date   • ABDOMINAL EXPLORATION     • APPENDECTOMY       Social History     Tobacco Use   • Smoking status: Former Smoker     Packs/day: 0.50     Years: 38.00     Pack years: 19.00     Quit date: 2020     Years since quittin.1   • Smokeless tobacco: Current User     Types: Chew   • Tobacco comment: down 5/cigarrettes a day or less   Substance Use Topics   • Alcohol use: Yes     Comment: socially - usually 1 beer twice a year     Family and Occupational History     Socioeconomic History   • Marital status:      Spouse name: Not on file   • Number of children: Not on file   • Years of education: Not on file   • Highest education level: GED or equivalent   Occupational History   • Not on file       Objective     Postural Observations  Seated posture: poor  Standing posture: poor        Shoulder Screen    Shoulder range of motion within functional limits with the following exceptions: Limited flexion  Shoulder strength within functional limits with the following exceptions: WNL    Neurological Testing     Reflexes   Left   Biceps (C5/C6): normal (2+)  Triceps (C7): normal (2+)  Ankle clonus reflex: negative  Gómez's reflex: negative    Right   Biceps (C5/C6): normal (2+)  Triceps (C7): normal (2+)  Ankle clonus reflex: negative  Gómez's reflex: negative    Active Range of Motion     Cervical Spine   Flexion: Active cervical flexion: 33deg.  Extension: Active cervical extension: 30deg.  Left lateral flexion: Active left cervical lateral flexion: 52deg.  Right lateral flexion: Active right cervical lateral flexion: 50deg.  Left rotation: Active left cervical rotation: 68deg.  Right rotation: Active right cervical rotation: 65deg.    Joint Play   Spine     Central PA  Glide        C3: hypomobile       C4: hypomobile       C5: hypomobile       C6: hypomobile        Courtney Cervical Test     Sitting repeated motions:   Retraction in sitting     Symptoms during testing: decreases    Symptoms after testing: no better  Repeat retraction in sitting     Symptoms during testing: decreases    Symptoms after testing: no better  Flexion in sitting     Symptoms during testing: produces    Symptoms after testing: no worse  Repeat flexion in sitting     Symptoms during testing: produces    Symptoms after testing: worse        Therapeutic Exercises (CPT 58978):     1. Postural correction, x5min with lumbar roll    2. Chin tuck trial , x5-10 every 3-5 hours      Time-based treatments/modalities:    Physical Therapy Timed Treatment Charges  Therapeutic exercise minutes (CPT 53762): 10 minutes      Assessment, Response and Plan:   Impairments: abnormal or restricted ROM, impaired physical strength and pain with function    Assessment details:  Patient presents with signs and symptoms consistent with a cervical/thoracic derangement. Patient limitations include weakness, decreased ROM, and pain. Patient demonstrated a partial flexion bias. Patient will benefit from skilled therapy to improve the aforementioned deficits and decrease further functional decline.   Prognosis: fair    Goals:   Short Term Goals:   1) Patient's cervical flexion will improve by 10deg to facilitate improved view of ground.  2) Patient's symptoms will improve to facilitate donning shoes.  Short term goal time span:  2-4 weeks      Long Term Goals:    1) Patient's symptoms will improve to allow for lifting greater than 10lbs.  2) Patient's NDI will improve by 10 to demonstrate functional improvement  Long term goal time span:  6-8 weeks    Plan:   Therapy options:  Physical therapy treatment to continue  Planned therapy interventions:  E Stim Unattended (CPT 14470), Manual Therapy (CPT 33555), Neuromuscular Re-education (CPT  79332), Therapeutic Exercise (CPT 74365) and Hot or Cold Pack Therapy (CPT 33677)  Frequency:  2x week  Duration in weeks:  8  Discussed with:  Patient      Functional Assessment Used  PT Functional Assessment Tool Used: NDI  PT Functional Assessment Score: 46%     Referring provider co-signature:  I have reviewed this plan of care and my co-signature certifies the need for services.    Certification Period: 03/31/2021 to  05/27/21    Physician Signature: ________________________________ Date: ______________

## 2021-04-01 ENCOUNTER — TELEPHONE (OUTPATIENT)
Dept: MEDICAL GROUP | Facility: MEDICAL CENTER | Age: 51
End: 2021-04-01

## 2021-04-01 NOTE — TELEPHONE ENCOUNTER
ESTABLISHED PATIENT PRE-VISIT PLANNING     Patient was NOT contacted to complete PVP.     Note: Patient will not be contacted if there is no indication to call.     1.  Reviewed notes from the last few office visits within the medical group: Yes    2.  If any orders were placed at last visit or intended to be done for this visit (i.e. 6 mos follow-up), do we have Results/Consult Notes?         •  Labs - Labs ordered, completed on 03/12/21 and results are in chart.  Note: If patient appointment is for lab review and patient did not complete labs, check with provider if OK to reschedule patient until labs completed.       •  Imaging - Imaging was not ordered at last office visit.       •  Referrals - Referral ordered, patient has NOT been seen.    3. Is this appointment scheduled as a Hospital Follow-Up? No    4.  Immunizations were updated in Epic using Reconcile Outside Information activity? Yes    5.  Patient is due for the following Health Maintenance Topics:   Health Maintenance Due   Topic Date Due   • Annual Pulmonary Function Test / Spirometry  Never done   • COVID-19 Vaccine (1) Never done

## 2021-04-02 ENCOUNTER — PHYSICAL THERAPY (OUTPATIENT)
Dept: PHYSICAL THERAPY | Facility: REHABILITATION | Age: 51
End: 2021-04-02
Attending: INTERNAL MEDICINE
Payer: COMMERCIAL

## 2021-04-02 DIAGNOSIS — M54.2 NECK PAIN: ICD-10-CM

## 2021-04-02 DIAGNOSIS — M54.6 THORACIC SPINE PAIN: ICD-10-CM

## 2021-04-02 PROCEDURE — 97110 THERAPEUTIC EXERCISES: CPT

## 2021-04-02 NOTE — OP THERAPY DAILY TREATMENT
Outpatient Physical Therapy  DAILY TREATMENT     [facility] Outpatient Physical Therapy [place]  [address], [address]  [facility] [address]  Phone:  [phone]  Fax:  [phone]    Date: 04/02/2021    Patient: Luciano Silverman  YOB: 1970  MRN: [id number]     Time Calculation    Start time: 1000  Stop time: 1030 Time Calculation (min): 30 minutes         Chief Complaint: Back Problem and Neck Problem    Visit #: 2    SUBJECTIVE:  Patient reports no change in symptoms. Notes minor neck soreness.     OBJECTIVE:  Current objective measures:           Therapeutic Exercises (CPT 35706):     1. Chin tuck, x10, NC:NE    2. Seated thoracic extension, x10, decrease; better    3. Seated thoracic extension, x10, with foam roller decrease; better    4. Rows, x15    5. Pulldowns, x15    6. UBE, x6min      Time-based treatments/modalities:    Physical Therapy Timed Treatment Charges  Therapeutic exercise minutes (CPT 20907): 30 minutes      Pain rating (1-10) before treatment:  3  Pain rating (1-10) after treatment:  1    ASSESSMENT:   Response to treatment: Patient responded well to thoracic extension. Patient will continue with HEP and report at next visit.     PLAN/RECOMMENDATIONS:   Plan for treatment: therapy treatment to continue next visit.  Planned interventions for next visit: continue with current treatment.

## 2021-04-06 ENCOUNTER — APPOINTMENT (OUTPATIENT)
Dept: MEDICAL GROUP | Facility: MEDICAL CENTER | Age: 51
End: 2021-04-06
Payer: COMMERCIAL

## 2021-04-06 ENCOUNTER — IMMUNIZATION (OUTPATIENT)
Dept: FAMILY PLANNING/WOMEN'S HEALTH CLINIC | Facility: IMMUNIZATION CENTER | Age: 51
End: 2021-04-06
Payer: COMMERCIAL

## 2021-04-06 DIAGNOSIS — Z23 ENCOUNTER FOR VACCINATION: Primary | ICD-10-CM

## 2021-04-06 PROCEDURE — 91300 PFIZER SARS-COV-2 VACCINE: CPT | Performed by: INTERNAL MEDICINE

## 2021-04-06 PROCEDURE — 0001A PFIZER SARS-COV-2 VACCINE: CPT | Performed by: INTERNAL MEDICINE

## 2021-04-07 ENCOUNTER — APPOINTMENT (OUTPATIENT)
Dept: PHYSICAL THERAPY | Facility: REHABILITATION | Age: 51
End: 2021-04-07
Attending: INTERNAL MEDICINE
Payer: COMMERCIAL

## 2021-04-09 ENCOUNTER — PHYSICAL THERAPY (OUTPATIENT)
Dept: PHYSICAL THERAPY | Facility: REHABILITATION | Age: 51
End: 2021-04-09
Attending: INTERNAL MEDICINE
Payer: COMMERCIAL

## 2021-04-09 DIAGNOSIS — M54.2 NECK PAIN: ICD-10-CM

## 2021-04-09 DIAGNOSIS — M54.6 THORACIC SPINE PAIN: ICD-10-CM

## 2021-04-09 PROCEDURE — 97110 THERAPEUTIC EXERCISES: CPT

## 2021-04-09 NOTE — OP THERAPY DAILY TREATMENT
Outpatient Physical Therapy  DAILY TREATMENT     [facility] Outpatient Physical Therapy [place]  [address]  [facility] [address]  Phone:  [phone]  Fax:  [phone]    Date: 04/09/2021    Patient: Luciano Silverman  YOB: 1970  MRN: [id number]     Time Calculation    Start time: 1000  Stop time: 1030 Time Calculation (min): 30 minutes         Chief Complaint: Back Problem and Neck Problem    Visit #: 3    SUBJECTIVE:  Patient reports that symptoms of neck pain and HA are slightly less. He notes that his neck area is improveing. Notes his lower back is still painful.     OBJECTIVE:  Current objective measures:           Therapeutic Exercises (CPT 76968):     1. Nu step, x10min    2. UBE, x3/3    3. Multifidi push, x10    4. Basic tra edu, x5min    5. Bridge holds, x10    6. Scap ret/dep, x10      Time-based treatments/modalities:    Physical Therapy Timed Treatment Charges  Therapeutic exercise minutes (CPT 39990): 30 minutes      ASSESSMENT:   Response to treatment: Patient responded well to therapy with an increase in fatigue with no increase in pain. Patient to continue with strength training program.     PLAN/RECOMMENDATIONS:   Plan for treatment: therapy treatment to continue next visit.  Planned interventions for next visit: continue with current treatment.

## 2021-04-14 ENCOUNTER — TELEMEDICINE (OUTPATIENT)
Dept: MEDICAL GROUP | Facility: MEDICAL CENTER | Age: 51
End: 2021-04-14
Payer: COMMERCIAL

## 2021-04-14 ENCOUNTER — PHYSICAL THERAPY (OUTPATIENT)
Dept: PHYSICAL THERAPY | Facility: REHABILITATION | Age: 51
End: 2021-04-14
Attending: INTERNAL MEDICINE
Payer: COMMERCIAL

## 2021-04-14 VITALS — WEIGHT: 204 LBS | HEIGHT: 69 IN | BODY MASS INDEX: 30.21 KG/M2

## 2021-04-14 DIAGNOSIS — K76.0 NONALCOHOLIC FATTY LIVER DISEASE: ICD-10-CM

## 2021-04-14 DIAGNOSIS — M54.6 THORACIC SPINE PAIN: ICD-10-CM

## 2021-04-14 DIAGNOSIS — M54.2 NECK PAIN: ICD-10-CM

## 2021-04-14 DIAGNOSIS — R73.03 PREDIABETES: ICD-10-CM

## 2021-04-14 DIAGNOSIS — R74.8 ELEVATED LIVER ENZYMES: ICD-10-CM

## 2021-04-14 DIAGNOSIS — I10 ESSENTIAL HYPERTENSION: ICD-10-CM

## 2021-04-14 DIAGNOSIS — R16.0 HEPATOMEGALY: ICD-10-CM

## 2021-04-14 DIAGNOSIS — I70.90 ATHEROSCLEROSIS: ICD-10-CM

## 2021-04-14 DIAGNOSIS — E87.1 HYPONATREMIA: ICD-10-CM

## 2021-04-14 PROBLEM — K51.00 PANCOLITIS (HCC): Status: RESOLVED | Noted: 2021-03-01 | Resolved: 2021-04-14

## 2021-04-14 PROBLEM — D50.0 IRON DEFICIENCY ANEMIA DUE TO CHRONIC BLOOD LOSS: Status: RESOLVED | Noted: 2019-03-13 | Resolved: 2021-04-14

## 2021-04-14 PROCEDURE — 99214 OFFICE O/P EST MOD 30 MIN: CPT | Mod: 95,CR | Performed by: INTERNAL MEDICINE

## 2021-04-14 PROCEDURE — 97110 THERAPEUTIC EXERCISES: CPT

## 2021-04-14 ASSESSMENT — FIBROSIS 4 INDEX: FIB4 SCORE: 1.13

## 2021-04-14 NOTE — PROGRESS NOTES
Telemedicine: Established Patient   This evaluation was conducted via Zoom using secure and encrypted videoconferencing technology. The patient was in a private location in the state of [place].    The patient's identity was confirmed and verbal consent was obtained for this virtual visit.    Subjective:   CC:   Chief Complaint   Patient presents with   • Follow-Up       Luciano Silverman is a 50 y.o. male presenting for evaluation and management of:    Following up since last visit.    Was admitted for colitis since last visit, symptoms have since totally resolved.    Labs from 3/12/2021 showed normal GFR, A1c 5.8, reasonable CBC, iron levels normal, CMP showing sodium 129, AST 51 ALT 91.    The hyponatremia seems associate with the recent start of hydrochlorothiazide.  Now on Flomax from his urologist blood pressure has been running 100/60, no symptoms of dizziness/ hypotension.    With the elevated liver enzymes he denies any nausea, vomiting, abd pain.  Does not take any unusual over-the-counter herbal supplements.  Rare Excedrin, no other over-the-counter pain medication.  Denies alcohol use.  CT abdomen pelvis 3/1/2021 from when he was admitted did show hepatomegaly but no gallstones or abnormal findings the gallbladder, ducts were normal caliber, spleen normal in size.  There was note of diverticulosis and atherosclerotic diseases as well as s/o emphysema.    He was able to establish with the psychiatry team at [facility], he feels comfortable with this treatment plan.    Says he sees cardiologist 5/4/2021.  Remains on Repatha and TriCor.    ROS  Otherwise unremarkable    No Known Allergies    Current medicines (including changes today)  Current Outpatient Medications   Medication Sig Dispense Refill   • multivitamin (THERAGRAN) Tab Take 1 tablet by mouth every day.     • asa/apap/caffeine (EXCEDRIN) 250-250-65 MG Tab Take 3 Tablets by mouth one time as needed for Headache.     • tamsulosin (FLOMAX) 0.4 MG  capsule Take 0.4 mg by mouth 2 (two) times a day.     • buPROPion (WELLBUTRIN XL) 300 MG XL tablet TAKE 1 TABLET BY MOUTH EVERY MORNING 90 Tab 0   • fluoxetine (PROZAC) 40 MG capsule Take 2 Caps by mouth every day for 90 days. 180 Cap 0   • busPIRone (BUSPAR) 30 MG tablet Take 1 Tab by mouth 2 times a day for 90 days. 180 Tab 0   • prazosin (MINIPRESS) 2 MG Cap Take 2 Caps by mouth every bedtime for 90 days. 180 Cap 0   • lisinopril (PRINIVIL) 40 MG tablet TAKE 1 TABLET BY MOUTH EVERY DAY 90 Tab 3   • fluticasone (FLONASE) 50 MCG/ACT nasal spray 1 SPRAY INTO EACH NOSTRIL EVERY DAY. 48 mL 2   • albuterol 108 (90 Base) MCG/ACT Aero Soln inhalation aerosol Inhale 2 Puffs by mouth every four hours as needed for Shortness of Breath. (Patient not taking: Reported on 3/1/2021) 1 Inhaler 0   • REPATHA SURECLICK 140 MG/ML Solution Auto-injector Inject 1 Dose as instructed every 14 days.     • urea (CARMOL) 10 % cream Use after showers and daily all over to dry skin patches. 2 Tube 3   • cyclobenzaprine (FLEXERIL) 10 MG Tab Take 10 mg by mouth as needed.  1   • loratadine (CLARITIN) 10 MG Tab Take 10 mg by mouth every day.     • atorvastatin (LIPITOR) 80 MG tablet Take 80 mg by mouth every day.  11   • fenofibrate (TRICOR) 145 MG Tab TAKE 1 TABLET BY ORAL ROUTE EVERY DAY BEFORE BED  5   • omeprazole (PRILOSEC) 20 MG delayed-release capsule Take 20 mg by mouth every day.       No current facility-administered medications for this visit.       Patient Active Problem List    Diagnosis Date Noted   • Panic disorder with agoraphobia and mild panic attacks 04/29/2019     Priority: Medium   • Essential hypertension 03/06/2019     Priority: Medium   • Dyslipidemia 03/06/2019     Priority: Low   • Atherosclerosis 04/14/2021   • History of CVA (cerebrovascular accident) 03/01/2021   • Prostate cancer (HCC) 02/23/2021   • Bilateral carpal tunnel syndrome 02/23/2021   • Marijuana user 05/11/2020   • PTSD (post-traumatic stress disorder)  "01/07/2020   • Diverticulosis 09/04/2019   • Cannabis dependence (HCC) 06/12/2019   • Nonalcoholic fatty liver disease 06/06/2019   • Gastroesophageal reflux disease without esophagitis 03/13/2019   • Elevated liver enzymes 03/13/2019   • Anxiety 03/06/2019   • Chronic bilateral low back pain with bilateral sciatica 03/06/2019   • COPD (chronic obstructive pulmonary disease) (HCC) 03/06/2019   • Tobacco dependence 03/06/2019   • Prediabetes 03/06/2019   • Mild episode of recurrent major depressive disorder (HCC) 03/06/2019   • Cerebrovascular accident (CVA) due to embolism of cerebral artery (HCC) 03/06/2019   • BMI 34.0-34.9,adult 03/06/2019   • Seasonal allergies 03/06/2019   • Obstructive sleep apnea syndrome 03/06/2019       Family History   Problem Relation Age of Onset   • Hypertension Mother    • Hyperlipidemia Mother    • Hypertension Father    • Lung Disease Father    • Cancer Child         non hodkins lymphoma   • Anxiety disorder Brother        He  has a past medical history of Anxiety, Arthritis, COPD (chronic obstructive pulmonary disease) (HCC), Depression, Hyperlipidemia, Hypertension, Panic disorder with agoraphobia and mild panic attacks (4/29/2019), PTSD (post-traumatic stress disorder), and Stroke ([facility]).  He  has a past surgical history that includes abdominal exploration and appendectomy.       Objective:   Ht 1.753 m (5' 9\")   Wt 92.5 kg (204 lb)   BMI 30.13 kg/m²     Physical Exam  No acute distress  Breathing comfortably no wheeze or cough  No appreciated rashes, focal swelling or abnormal limb movement  Alert and orientated  Assessment and Plan:   The following treatment plan was discussed:     1. Elevated liver enzymes  - GAMMA GT (GGT); Future  - Comp Metabolic Panel; Future    2. Hyponatremia  - Comp Metabolic Panel; Future    3. Essential hypertension  - Comp Metabolic Panel; Future    4. Prediabetes    5. Atherosclerosis    6. Nonalcoholic fatty liver disease    Plan to reassess liver " enzymes, potentially could have been elevated secondarily to the process that was causing the colitis around that time.  Could also consider with hepatomegaly potentially due to hepatic steatosis and he does have elevated BMI.  Healthy diet, exercise and weight loss discussed with patient.  He has no GI symptoms currently.  Imaging has shown hepatomegaly but no other signs of acute process to contribute to the transaminitis.  On review no substances such as excess Tylenol, etc. or alcohol contributing.    Suspect hyponatremia is drug effect of hydrochlorothiazide.  Plan to DC hydrochlorothiazide, monitor blood pressure at home and reassess electrolytes within the next few weeks.    By history sounds like blood pressure will be controlled on lisinopril and Flomax.  Noted also on prazosin.  DC hydrochlorothiazide as above.    Followed by vascular also cardiology clinic, remains on Repatha and TriCor for history of lipid disorder/atherosclerosis.    Plan to monitor prediabetes encourage healthy diet and exercise.    Follow-up: Return in about 1 month (around 5/14/2021).

## 2021-04-14 NOTE — OP THERAPY DAILY TREATMENT
Outpatient Physical Therapy  DAILY TREATMENT     [facility] Outpatient Physical Therapy [place]  [address]  [facility] [facility] [address]  Phone:  [phone]  Fax:  [phone]    Date: 04/14/2021    Patient: Luciano Silverman  YOB: 1970  MRN: [id number]     Time Calculation    Start time: 1000  Stop time: 1030 Time Calculation (min): 30 minutes         Chief Complaint: Back Problem and Neck Problem    Visit #: 4    SUBJECTIVE:  Patient reports that he feels his neck is a little stronger and notes slightly less pain.     OBJECTIVE:  Current objective measures:           Therapeutic Exercises (CPT 38508):     1. Rows, x20    2. UBE, x3/3    3. Wide rows, x20    4. Basic tra edu, x5min    5. Pulldowns, x20    6. Scap ret/dep, x20    7. Thoracic extension off chair , x10 every 2 hour trial      Time-based treatments/modalities:    Physical Therapy Timed Treatment Charges  Therapeutic exercise minutes (CPT 79578): 30 minutes      Pain rating (1-10) before treatment:  6  Pain rating (1-10) after treatment:  3    ASSESSMENT:   Response to treatment: Patient responded well to therapy with a slight decrease in symptoms with thoracic extension.     PLAN/RECOMMENDATIONS:   Plan for treatment: therapy treatment to continue next visit.  Planned interventions for next visit: continue with current treatment.

## 2021-04-16 ENCOUNTER — APPOINTMENT (OUTPATIENT)
Dept: PHYSICAL THERAPY | Facility: REHABILITATION | Age: 51
End: 2021-04-16
Attending: INTERNAL MEDICINE
Payer: COMMERCIAL

## 2021-04-19 ENCOUNTER — OFFICE VISIT (OUTPATIENT)
Dept: PHYSICAL MEDICINE AND REHAB | Facility: MEDICAL CENTER | Age: 51
End: 2021-04-19
Payer: COMMERCIAL

## 2021-04-19 VITALS
DIASTOLIC BLOOD PRESSURE: 72 MMHG | WEIGHT: 209.22 LBS | BODY MASS INDEX: 30.99 KG/M2 | HEART RATE: 71 BPM | SYSTOLIC BLOOD PRESSURE: 128 MMHG | HEIGHT: 69 IN | TEMPERATURE: 98 F | OXYGEN SATURATION: 94 %

## 2021-04-19 DIAGNOSIS — M65.331 TRIGGER FINGER, RIGHT MIDDLE FINGER: ICD-10-CM

## 2021-04-19 DIAGNOSIS — G89.29 CHRONIC NECK PAIN: ICD-10-CM

## 2021-04-19 DIAGNOSIS — M65.321 TRIGGER INDEX FINGER OF RIGHT HAND: ICD-10-CM

## 2021-04-19 DIAGNOSIS — M54.2 CHRONIC NECK PAIN: ICD-10-CM

## 2021-04-19 DIAGNOSIS — G56.03 BILATERAL CARPAL TUNNEL SYNDROME: ICD-10-CM

## 2021-04-19 PROCEDURE — 76942 ECHO GUIDE FOR BIOPSY: CPT | Performed by: PHYSICAL MEDICINE & REHABILITATION

## 2021-04-19 PROCEDURE — 99214 OFFICE O/P EST MOD 30 MIN: CPT | Mod: 25 | Performed by: PHYSICAL MEDICINE & REHABILITATION

## 2021-04-19 PROCEDURE — 20550 NJX 1 TENDON SHEATH/LIGAMENT: CPT | Mod: 59,F7 | Performed by: PHYSICAL MEDICINE & REHABILITATION

## 2021-04-19 RX ORDER — DEXAMETHASONE SODIUM PHOSPHATE 4 MG/ML
4 INJECTION, SOLUTION INTRA-ARTICULAR; INTRALESIONAL; INTRAMUSCULAR; INTRAVENOUS; SOFT TISSUE ONCE
Status: COMPLETED | OUTPATIENT
Start: 2021-04-19 | End: 2021-04-19

## 2021-04-19 RX ADMIN — DEXAMETHASONE SODIUM PHOSPHATE 4 MG: 4 INJECTION, SOLUTION INTRA-ARTICULAR; INTRALESIONAL; INTRAMUSCULAR; INTRAVENOUS; SOFT TISSUE at 17:12

## 2021-04-19 ASSESSMENT — PATIENT HEALTH QUESTIONNAIRE - PHQ9
5. POOR APPETITE OR OVEREATING: 1 - SEVERAL DAYS
CLINICAL INTERPRETATION OF PHQ2 SCORE: 5
SUM OF ALL RESPONSES TO PHQ QUESTIONS 1-9: 16

## 2021-04-19 ASSESSMENT — PAIN SCALES - GENERAL: PAINLEVEL: 6=MODERATE PAIN

## 2021-04-19 ASSESSMENT — FIBROSIS 4 INDEX: FIB4 SCORE: 1.13

## 2021-04-19 NOTE — PROCEDURES
Date of Service: 4/19/2021    Physician/s: Jaime Aponte MD    Pre-operative Diagnosis: RIGHT index, long digit trigger finger and pain    Post-operative Diagnosis: RIGHT index, long digit trigger finger and pain    Procedure: RIGHT index, long digit trigger finger injection    Description of procedure:    The risks, benefits, and alternatives of the procedure were reviewed and discussed with the patient.  Written informed consent was freely obtained. A pre-procedural time-out was conducted by the physician verifying patient’s identity, procedure to be performed, procedure site and side, and allergy verification. Appropriate equipment was determined to be in place for the procedure.     No sedation was used for this procedure.     In the office suite the patient was placed in a sitting position, and his RIGHT  hand/s were rested with the palm/s up on a sterile woodward stand, and the skin was prepped and draped in the usual sterile fashion on the whole palmar hand and fingers. A solution was prepared with 2mL of 1% lidocaine, 1mL of 4mg/mL of dexamethasone for a total of 3mL in a 3mL syringe with a 27g 1.5 inch needle.  The RIGHT  index flexor tendon for injection was identified at the proximal and distal joint line/s, and the targets for injection were marked. Under ultrasound guidance with an out of plane approach,  A 27g 1.5 inch needle was placed into skin and advanced adjacent to the tendon  proximal to the metacarpophalangeal joint line of the finger. Following negative aspiration, a total of 0.5cc's solution mixture was injected adjacent to the tendon. .       The RIGHT  long flexor tendon for injection was identified at the proximal and distal joint line/s, and the targets for injection were marked. Under ultrasound guidance with an out of plane approach,  A 27g 1.5 inch needle was placed into skin and advanced adjacent to the tendon  proximal to the metacarpophalangeal joint line of the finger. Following  negative aspiration, a total of 0.5mL of the above solution mixture was injected adjacent to the tendon. .         The patient's skin was wiped with a 4x4 gauze, the area was cleansed with alcohol prep, and a bandaid was applied. There were no complications noted.     The images were uploaded to our secure system for permanent storage.     Jaime Aponte MD  Physical Medicine and Rehabilitation  Interventional Spine and Sports Physiatry  [facility]

## 2021-04-19 NOTE — PROGRESS NOTES
Follow up patient note  Interventional spine and sports physiatry, Physical medicine rehabilitation      Chief complaint:   Chief Complaint   Patient presents with   • Follow-Up     Hand Pain          HISTORY    Please see new patient note by Dr Aponte,  for more details.     HPI  Patient identification: Luciano Silverman ,  1970,   With Diagnoses of Trigger index finger of right hand, Trigger finger, right middle finger, Bilateral carpal tunnel syndrome, and Chronic neck pain were pertinent to this visit.     The patient's carpal tunnel syndrome has improved significantly when he is using his neutral with splints at night.  Hand pain and numbness and tingling have improved dramatically and he reports functionally he is improving with improved  strength.  However he still has trigger finger which is been worsening of the right pointer and long finger.  He occasionally has to use the other hand to reduce the triggering.  This wakes him up at night with the triggering.  His numbness and tingling is no longer waking him up overnight.  The pain with trigger finger is 6 out of 10 in intensity intermittent worse with triggering anytime he makes a fist on the right hand.    Neck pain stable.       ROS Red Flags :   Fever, Chills, Sweats: Denies  Involuntary Weight Loss: Denies  Bowel/Bladder Incontinence: Denies  Saddle Anesthesia: Denies        PMHx:   Past Medical History:   Diagnosis Date   • Anxiety    • Arthritis    • COPD (chronic obstructive pulmonary disease) (HCC)    • Depression    • Hyperlipidemia    • Hypertension    • Panic disorder with agoraphobia and mild panic attacks 2019   • PTSD (post-traumatic stress disorder)    • Stroke (HCC)        PSHx:   Past Surgical History:   Procedure Laterality Date   • ABDOMINAL EXPLORATION     • APPENDECTOMY         Family history   Family History   Problem Relation Age of Onset   • Hypertension Mother    • Hyperlipidemia Mother    • Hypertension Father    •  Lung Disease Father    • Cancer Child         non hodkins lymphoma   • Anxiety disorder Brother          Medications:   Outpatient Medications Marked as Taking for the 4/19/21 encounter (Office Visit) with Jaime Aponte M.D.   Medication Sig Dispense Refill   • multivitamin (THERAGRAN) Tab Take 1 tablet by mouth every day.     • asa/apap/caffeine (EXCEDRIN) 250-250-65 MG Tab Take 3 Tablets by mouth one time as needed for Headache.     • tamsulosin (FLOMAX) 0.4 MG capsule Take 0.4 mg by mouth 2 (two) times a day.     • buPROPion (WELLBUTRIN XL) 300 MG XL tablet TAKE 1 TABLET BY MOUTH EVERY MORNING 90 Tab 0   • fluoxetine (PROZAC) 40 MG capsule Take 2 Caps by mouth every day for 90 days. 180 Cap 0   • busPIRone (BUSPAR) 30 MG tablet Take 1 Tab by mouth 2 times a day for 90 days. 180 Tab 0   • prazosin (MINIPRESS) 2 MG Cap Take 2 Caps by mouth every bedtime for 90 days. 180 Cap 0   • lisinopril (PRINIVIL) 40 MG tablet TAKE 1 TABLET BY MOUTH EVERY DAY 90 Tab 3   • fluticasone (FLONASE) 50 MCG/ACT nasal spray 1 SPRAY INTO EACH NOSTRIL EVERY DAY. 48 mL 2   • albuterol 108 (90 Base) MCG/ACT Aero Soln inhalation aerosol Inhale 2 Puffs by mouth every four hours as needed for Shortness of Breath. 1 Inhaler 0   • REPATHA SURECLICK 140 MG/ML Solution Auto-injector Inject 1 Dose as instructed every 14 days.     • urea (CARMOL) 10 % cream Use after showers and daily all over to dry skin patches. 2 Tube 3   • cyclobenzaprine (FLEXERIL) 10 MG Tab Take 10 mg by mouth as needed.  1   • loratadine (CLARITIN) 10 MG Tab Take 10 mg by mouth every day.     • atorvastatin (LIPITOR) 80 MG tablet Take 80 mg by mouth every day.  11   • fenofibrate (TRICOR) 145 MG Tab TAKE 1 TABLET BY ORAL ROUTE EVERY DAY BEFORE BED  5   • omeprazole (PRILOSEC) 20 MG delayed-release capsule Take 20 mg by mouth every day.       Current Facility-Administered Medications for the 4/19/21 encounter (Office Visit) with Jaime Aponte M.D.   Medication Dose  Route Frequency Provider Last Rate Last Admin   • dexamethasone (DECADRON) injection 4 mg  4 mg Injection Once Jaime Aponte M.D.            Current Outpatient Medications on File Prior to Visit   Medication Sig Dispense Refill   • multivitamin (THERAGRAN) Tab Take 1 tablet by mouth every day.     • asa/apap/caffeine (EXCEDRIN) 250-250-65 MG Tab Take 3 Tablets by mouth one time as needed for Headache.     • tamsulosin (FLOMAX) 0.4 MG capsule Take 0.4 mg by mouth 2 (two) times a day.     • buPROPion (WELLBUTRIN XL) 300 MG XL tablet TAKE 1 TABLET BY MOUTH EVERY MORNING 90 Tab 0   • fluoxetine (PROZAC) 40 MG capsule Take 2 Caps by mouth every day for 90 days. 180 Cap 0   • busPIRone (BUSPAR) 30 MG tablet Take 1 Tab by mouth 2 times a day for 90 days. 180 Tab 0   • prazosin (MINIPRESS) 2 MG Cap Take 2 Caps by mouth every bedtime for 90 days. 180 Cap 0   • lisinopril (PRINIVIL) 40 MG tablet TAKE 1 TABLET BY MOUTH EVERY DAY 90 Tab 3   • fluticasone (FLONASE) 50 MCG/ACT nasal spray 1 SPRAY INTO EACH NOSTRIL EVERY DAY. 48 mL 2   • albuterol 108 (90 Base) MCG/ACT Aero Soln inhalation aerosol Inhale 2 Puffs by mouth every four hours as needed for Shortness of Breath. 1 Inhaler 0   • REPATHA SURECLICK 140 MG/ML Solution Auto-injector Inject 1 Dose as instructed every 14 days.     • urea (CARMOL) 10 % cream Use after showers and daily all over to dry skin patches. 2 Tube 3   • cyclobenzaprine (FLEXERIL) 10 MG Tab Take 10 mg by mouth as needed.  1   • loratadine (CLARITIN) 10 MG Tab Take 10 mg by mouth every day.     • atorvastatin (LIPITOR) 80 MG tablet Take 80 mg by mouth every day.  11   • fenofibrate (TRICOR) 145 MG Tab TAKE 1 TABLET BY ORAL ROUTE EVERY DAY BEFORE BED  5   • omeprazole (PRILOSEC) 20 MG delayed-release capsule Take 20 mg by mouth every day.       No current facility-administered medications on file prior to visit.         Allergies:   No Known Allergies    Social Hx:   Social History     Socioeconomic  History   • Marital status:      Spouse name: Not on file   • Number of children: Not on file   • Years of education: Not on file   • Highest education level: GED or equivalent   Occupational History   • Not on file   Tobacco Use   • Smoking status: Former Smoker     Packs/day: 0.50     Years: 38.00     Pack years: 19.00     Quit date: 2020     Years since quittin.1   • Smokeless tobacco: Current User     Types: Chew   • Tobacco comment: down 5/cigarrettes a day or less   Substance and Sexual Activity   • Alcohol use: Yes     Comment: socially - usually 1 beer twice a year   • Drug use: Yes     Frequency: 7.0 times per week     Types: Marijuana     Comment: nightly to sleep   • Sexual activity: Not on file   Other Topics Concern   •  Service No   • Blood Transfusions No   • Caffeine Concern No   • Occupational Exposure No   • Hobby Hazards No   • Sleep Concern Yes   • Stress Concern Yes   • Weight Concern No   • Special Diet No   • Back Care No   • Exercise Yes   • Bike Helmet No   • Seat Belt Yes   • Self-Exams No   Social History Narrative   • Not on file     Social Determinants of Health     Financial Resource Strain: High Risk   • Difficulty of Paying Living Expenses: Very hard   Food Insecurity: Food Insecurity Present   • Worried About Running Out of Food in the Last Year: Sometimes true   • Ran Out of Food in the Last Year: Sometimes true   Transportation Needs: No Transportation Needs   • Lack of Transportation (Medical): No   • Lack of Transportation (Non-Medical): No   Physical Activity: Inactive   • Days of Exercise per Week: 0 days   • Minutes of Exercise per Session: 0 min   Stress: Stress Concern Present   • Feeling of Stress : Very much   Social Connections: Moderately Isolated   • Frequency of Communication with Friends and Family: Once a week   • Frequency of Social Gatherings with Friends and Family: Never   • Attends Jain Services: Never   • Active Member of Clubs or  "Organizations: No   • Attends Club or Organization Meetings: Never   • Marital Status:    Intimate Partner Violence:    • Fear of Current or Ex-Partner:    • Emotionally Abused:    • Physically Abused:    • Sexually Abused:          EXAMINATION     Physical Exam:   Vitals: /72 (BP Location: Left arm, Patient Position: Sitting, BP Cuff Size: Adult long)   Pulse 71   Temp 36.7 °C (98 °F) (Temporal)   Ht 1.753 m (5' 9\")   Wt 94.9 kg (209 lb 3.5 oz)   SpO2 94%     Constitutional:   Body Habitus: Body mass index is 30.9 kg/m².  Cooperation: Fully cooperates with exam  Appearance: Well-groomed no disheveled    Respiratory-  breathing comfortable on room air, no audible wheezing  Cardiovascular- capillary refills less than 2 seconds. No lower extremity edema is noted.   Psychiatric- alert and oriented ×3. Normal affect.    MSK and Neuro: -  Bilateral hands:   Inspection: No swelling,  Deformities or rashes. Symmetric appearing thenar and hyperthenar regions bilaterally.  Palpation no significant tenderness to palpation throughout the bilateral hands  Range of motion is within normal limits throughout bilateral hands, fingers and wrist.  Special tests:  Tinel's at the wrist over the median nerve negative bilaterally  Carpal tunnel compression: negative bilaterally  Phalen's test: negative bilaterally  Finkelstein's test: negative bilaterally  CMC grind test negative bilaterally   Triggering of the right pointer and long finger when he attempts to make a fist      MEDICAL DECISION MAKING    DATA    Labs:   Lab Results   Component Value Date/Time    SODIUM 129 (L) 03/12/2021 09:59 AM    POTASSIUM 4.2 03/12/2021 09:59 AM    CHLORIDE 92 (L) 03/12/2021 09:59 AM    CO2 25 03/12/2021 09:59 AM    GLUCOSE 148 (H) 03/12/2021 09:59 AM    BUN 15 03/12/2021 09:59 AM    CREATININE 0.99 03/12/2021 09:59 AM        No results found for: PROTHROMBTM, INR     Lab Results   Component Value Date/Time    WBC 4.8 03/12/2021 " 09:59 AM    RBC 4.54 (L) 2021 09:59 AM    HEMOGLOBIN 14.1 2021 09:59 AM    HEMATOCRIT 42.2 2021 09:59 AM    MCV 93.0 2021 09:59 AM    MCH 31.1 2021 09:59 AM    MCHC 33.4 (L) 2021 09:59 AM    MPV 10.2 2021 09:59 AM    NEUTSPOLYS 77.60 (H) 2021 02:27 AM    LYMPHOCYTES 11.80 (L) 2021 02:27 AM    MONOCYTES 9.60 2021 02:27 AM    EOSINOPHILS 0.40 2021 02:27 AM    BASOPHILS 0.20 2021 02:27 AM        Lab Results   Component Value Date/Time    HBA1C 5.8 (H) 2021 09:59 AM          Imaging:   I personally reviewed following images      I reviewed the following radiology reports                              Results for orders placed during the hospital encounter of 21   DX-CERVICAL SPINE-2 OR 3 VIEWS    Impression Negative cervical spine series.          Results for orders placed during the hospital encounter of 20   DX-CHEST-2 VIEWS    Impression No radiographic evidence of acute cardiopulmonary process.                            Results for orders placed during the hospital encounter of 21   DX-THORACIC SPINE-WITH SWIMMERS VIEW    Impression Unremarkable thoracic spine.      Results for orders placed in visit on 10/10/17   DX-THORACIC SPINE-2 VIEWS                 DIAGNOSIS   Visit Diagnoses     ICD-10-CM   1. Trigger index finger of right hand  M65.321   2. Trigger finger, right middle finger  M65.331   3. Bilateral carpal tunnel syndrome  G56.03   4. Chronic neck pain  M54.2    G89.29         ASSESSMENT and PLAN:     Luciano Silverman  1970 male      Luciano was seen today for follow-up.    Diagnoses and all orders for this visit:    Trigger index finger of right hand  -     dexamethasone (DECADRON) injection 4 mg    Trigger finger, right middle finger  -     dexamethasone (DECADRON) injection 4 mg    Bilateral carpal tunnel syndrome    Chronic neck pain      Continue neutral wrist months nightly.    Neck pain stable.  He can  continue Flexeril.  Continue home exercise program and wrist retches.    For the patient's trigger finger.  We decided to proceed with trigger finger injections in the area of pain.    Follow up: 2 month    Thank you for allowing me to participate in the care of this patient. If you have any questions please not hesitate to contact me.             Please note that this dictation was created using voice recognition software. I have made every reasonable attempt to correct obvious errors but there may be errors of grammar and content that I may have overlooked prior to finalization of this note.      Jaime Aponte MD  Interventional Spine and Sports Physiatry  Physical Medicine and Rehabilitation  [facility]

## 2021-04-20 ENCOUNTER — PHYSICAL THERAPY (OUTPATIENT)
Dept: PHYSICAL THERAPY | Facility: REHABILITATION | Age: 51
End: 2021-04-20
Attending: INTERNAL MEDICINE
Payer: COMMERCIAL

## 2021-04-20 DIAGNOSIS — M54.6 THORACIC SPINE PAIN: ICD-10-CM

## 2021-04-20 DIAGNOSIS — M54.2 NECK PAIN: ICD-10-CM

## 2021-04-20 PROCEDURE — 97110 THERAPEUTIC EXERCISES: CPT

## 2021-04-21 NOTE — OP THERAPY DAILY TREATMENT
Outpatient Physical Therapy  DAILY TREATMENT     [facility] [place]  [address], [address]  [place] [address]  Phone:  [phone]  Fax:  [phone]    Date: 04/20/2021    Patient: Luciano Silverman  YOB: 1970  MRN: [id number]     Time Calculation    Start time: 1530  Stop time: 1600 Time Calculation (min): 30 minutes         Chief Complaint: Back Problem and Neck Problem    Visit #: 5    SUBJECTIVE:  Patient reports that symptoms have been ok in his neck. Patient notes that he has a HA.     OBJECTIVE:  Current objective measures:           Therapeutic Exercises (CPT 66199):     1. Rows, x20    2. UBE, x3/3    3. Wide rows, x20    4. Basic tra edu, x5min    5. Pulldowns, x20    6. Scap ret/dep, x20    7. Supine on 1/2 faom roller, [id number]    8. SO self release, x5min    9. SO float x20, with 5x ret      Time-based treatments/modalities:    Physical Therapy Timed Treatment Charges  Therapeutic exercise minutes (CPT 15073): 30 minutes      Pain rating (1-10) before treatment:  4  Pain rating (1-10) after treatment:  1    ASSESSMENT:   Response to treatment: Patient responded well to therapy with an overall decrease in symptoms and improvement in function.     PLAN/RECOMMENDATIONS:   Plan for treatment: therapy treatment to continue next visit.  Planned interventions for next visit: continue with current treatment.

## 2021-04-22 ENCOUNTER — APPOINTMENT (OUTPATIENT)
Dept: PHYSICAL THERAPY | Facility: REHABILITATION | Age: 51
End: 2021-04-22
Attending: INTERNAL MEDICINE
Payer: COMMERCIAL

## 2021-04-28 ENCOUNTER — PHYSICAL THERAPY (OUTPATIENT)
Dept: PHYSICAL THERAPY | Facility: REHABILITATION | Age: 51
End: 2021-04-28
Attending: INTERNAL MEDICINE
Payer: COMMERCIAL

## 2021-04-28 ENCOUNTER — TELEPHONE (OUTPATIENT)
Dept: CARDIOLOGY | Facility: MEDICAL CENTER | Age: 51
End: 2021-04-28

## 2021-04-28 DIAGNOSIS — M54.6 THORACIC SPINE PAIN: ICD-10-CM

## 2021-04-28 DIAGNOSIS — M54.2 NECK PAIN: ICD-10-CM

## 2021-04-28 PROCEDURE — 97110 THERAPEUTIC EXERCISES: CPT

## 2021-04-28 NOTE — TELEPHONE ENCOUNTER
Spoke with pts wife, Ashley who stated pt was seen at [facility]. Records in chart. No recent testing or OV outside of [facility].    Appt time and date confirmed.

## 2021-04-28 NOTE — OP THERAPY DAILY TREATMENT
Outpatient Physical Therapy  DAILY TREATMENT     [facility] [place]  [address], [address]  [place] [address]  Phone:  [phone]  Fax:  [phone]    Date: 04/28/2021    Patient: Luciano Silverman  YOB: 1970  MRN: [id number]     Time Calculation    Start time: 0815  Stop time: 0845 Time Calculation (min): 30 minutes         Chief Complaint: Back Problem and Neck Problem    Visit #: 6    SUBJECTIVE:  Patient reports that his T spine only hurts during coughing, sneezing, and prolonged standing. Notes that he still has his HAs.     OBJECTIVE:  Current objective measures:   PT Functional Assessment Tool Used: NDI  PT Functional Assessment Score: 50       Therapeutic Exercises (CPT 39354):     1. Rows, x20    2. UBE, x3/3    3. Wide rows, x20    4. Basic tra edu, x5min    5. Pulldowns, x20    6. Scap ret/dep, x20    7. Thoracic extension off chair , x10 every 2 hour trial    8. Pulley, x5min    9. Supine dowel flexion, x20      Time-based treatments/modalities:    Physical Therapy Timed Treatment Charges  Therapeutic exercise minutes (CPT 33637): 30 minutes      Pain rating (1-10) before treatment:  2  Pain rating (1-10) after treatment:  2    ASSESSMENT:   Response to treatment: Patient's NDI score has worsened. Will attempt to change treatment direction if an increase in thoracic extension increases symptoms or no change occurs.     PLAN/RECOMMENDATIONS:   Plan for treatment: therapy treatment to continue next visit.  Planned interventions for next visit: continue with current treatment.

## 2021-04-30 ENCOUNTER — IMMUNIZATION (OUTPATIENT)
Dept: FAMILY PLANNING/WOMEN'S HEALTH CLINIC | Facility: IMMUNIZATION CENTER | Age: 51
End: 2021-04-30
Payer: COMMERCIAL

## 2021-04-30 DIAGNOSIS — Z23 ENCOUNTER FOR VACCINATION: Primary | ICD-10-CM

## 2021-04-30 PROCEDURE — 0002A PFIZER SARS-COV-2 VACCINE: CPT

## 2021-04-30 PROCEDURE — 91300 PFIZER SARS-COV-2 VACCINE: CPT

## 2021-05-04 ENCOUNTER — OFFICE VISIT (OUTPATIENT)
Dept: CARDIOLOGY | Facility: MEDICAL CENTER | Age: 51
End: 2021-05-04
Payer: COMMERCIAL

## 2021-05-04 VITALS
BODY MASS INDEX: 30.94 KG/M2 | HEIGHT: 69 IN | SYSTOLIC BLOOD PRESSURE: 112 MMHG | RESPIRATION RATE: 14 BRPM | OXYGEN SATURATION: 97 % | DIASTOLIC BLOOD PRESSURE: 78 MMHG | WEIGHT: 208.9 LBS | HEART RATE: 75 BPM

## 2021-05-04 DIAGNOSIS — I10 ESSENTIAL HYPERTENSION: ICD-10-CM

## 2021-05-04 DIAGNOSIS — I65.23 BILATERAL CAROTID ARTERY STENOSIS: ICD-10-CM

## 2021-05-04 DIAGNOSIS — E78.5 DYSLIPIDEMIA: ICD-10-CM

## 2021-05-04 DIAGNOSIS — I25.10 CORONARY ARTERY DISEASE INVOLVING NATIVE CORONARY ARTERY OF NATIVE HEART WITHOUT ANGINA PECTORIS: ICD-10-CM

## 2021-05-04 LAB — EKG IMPRESSION: NORMAL

## 2021-05-04 PROCEDURE — 93000 ELECTROCARDIOGRAM COMPLETE: CPT | Performed by: INTERNAL MEDICINE

## 2021-05-04 PROCEDURE — 99204 OFFICE O/P NEW MOD 45 MIN: CPT | Performed by: INTERNAL MEDICINE

## 2021-05-04 ASSESSMENT — ENCOUNTER SYMPTOMS
WEAKNESS: 0
GASTROINTESTINAL NEGATIVE: 1
PALPITATIONS: 0
FOCAL WEAKNESS: 0
ABDOMINAL PAIN: 0
RESPIRATORY NEGATIVE: 1
NAUSEA: 0
NERVOUS/ANXIOUS: 0
EYES NEGATIVE: 1
CONSTITUTIONAL NEGATIVE: 1
HEADACHES: 0
DIZZINESS: 0
COUGH: 0
BLURRED VISION: 0
CLAUDICATION: 0
WEIGHT LOSS: 0
BRUISES/BLEEDS EASILY: 0
CHILLS: 0
DOUBLE VISION: 0
VOMITING: 0
CARDIOVASCULAR NEGATIVE: 1
DEPRESSION: 0
SENSORY CHANGE: 1
MYALGIAS: 0
FEVER: 0
MUSCULOSKELETAL NEGATIVE: 1
MEMORY LOSS: 1
SHORTNESS OF BREATH: 0

## 2021-05-04 ASSESSMENT — FIBROSIS 4 INDEX: FIB4 SCORE: 1.13

## 2021-05-04 NOTE — PROGRESS NOTES
Chief Complaint   Patient presents with   • Hypertension     NP Dx: Essential hypertension       Subjective:   Luciano Silverman is a 50 y.o. male who presents today for new patient establishment for hypertension and hyperlipidemia.    Mr. Silverman is a 50 year old  with hypertension and hyperlipidemia, transient ischemic attack. He is clinically doing well. He admits to memory loss. He denies chest pain, shortness of breath, palpitations, nausea/vomiting or diaphoresis. He was previously seen at [facility]  By Luis Miguel Cabral.    Past Medical History:   Diagnosis Date   • Anxiety    • Arthritis    • COPD (chronic obstructive pulmonary disease) (HCC)    • Depression    • Hyperlipidemia    • Hypertension    • Panic disorder with agoraphobia and mild panic attacks 2019   • PTSD (post-traumatic stress disorder)    • Stroke (HCC)      Past Surgical History:   Procedure Laterality Date   • ABDOMINAL EXPLORATION     • APPENDECTOMY       Family History   Problem Relation Age of Onset   • Hypertension Mother    • Hyperlipidemia Mother    • Hypertension Father    • Lung Disease Father    • Cancer Child         non hodkins lymphoma   • Anxiety disorder Brother      Social History     Socioeconomic History   • Marital status:      Spouse name: Not on file   • Number of children: Not on file   • Years of education: Not on file   • Highest education level: GED or equivalent   Occupational History   • Not on file   Tobacco Use   • Smoking status: Former Smoker     Packs/day: 0.50     Years: 38.00     Pack years: 19.00     Quit date: 2020     Years since quittin.2   • Smokeless tobacco: Former User     Types: Chew   • Tobacco comment: down 5/cigarrettes a day or less   Substance and Sexual Activity   • Alcohol use: Yes     Comment: socially - usually 1 beer twice a year   • Drug use: Yes     Frequency: 7.0 times per week     Types: Marijuana     Comment: nightly to sleep   • Sexual activity:  Not on file   Other Topics Concern   •  Service No   • Blood Transfusions No   • Caffeine Concern No   • Occupational Exposure No   • Hobby Hazards No   • Sleep Concern Yes   • Stress Concern Yes   • Weight Concern No   • Special Diet No   • Back Care No   • Exercise Yes   • Bike Helmet No   • Seat Belt Yes   • Self-Exams No   Social History Narrative   • Not on file     Social Determinants of Health     Financial Resource Strain: High Risk   • Difficulty of Paying Living Expenses: Very hard   Food Insecurity: Food Insecurity Present   • Worried About Running Out of Food in the Last Year: Sometimes true   • Ran Out of Food in the Last Year: Sometimes true   Transportation Needs: No Transportation Needs   • Lack of Transportation (Medical): No   • Lack of Transportation (Non-Medical): No   Physical Activity: Inactive   • Days of Exercise per Week: 0 days   • Minutes of Exercise per Session: 0 min   Stress: Stress Concern Present   • Feeling of Stress : Very much   Social Connections: Moderately Isolated   • Frequency of Communication with Friends and Family: Once a week   • Frequency of Social Gatherings with Friends and Family: Never   • Attends Christian Services: Never   • Active Member of Clubs or Organizations: No   • Attends Club or Organization Meetings: Never   • Marital Status:    Intimate Partner Violence:    • Fear of Current or Ex-Partner:    • Emotionally Abused:    • Physically Abused:    • Sexually Abused:      No Known Allergies     (Medications reviewed.)  Outpatient Encounter Medications as of 5/4/2021   Medication Sig Dispense Refill   • multivitamin (THERAGRAN) Tab Take 1 tablet by mouth every day.     • asa/apap/caffeine (EXCEDRIN) 250-250-65 MG Tab Take 3 Tablets by mouth one time as needed for Headache.     • tamsulosin (FLOMAX) 0.4 MG capsule Take 0.4 mg by mouth 2 (two) times a day.     • buPROPion (WELLBUTRIN XL) 300 MG XL tablet TAKE 1 TABLET BY MOUTH EVERY MORNING 90 Tab 0    • fluoxetine (PROZAC) 40 MG capsule Take 2 Caps by mouth every day for 90 days. 180 Cap 0   • busPIRone (BUSPAR) 30 MG tablet Take 1 Tab by mouth 2 times a day for 90 days. 180 Tab 0   • prazosin (MINIPRESS) 2 MG Cap Take 2 Caps by mouth every bedtime for 90 days. 180 Cap 0   • lisinopril (PRINIVIL) 40 MG tablet TAKE 1 TABLET BY MOUTH EVERY DAY 90 Tab 3   • fluticasone (FLONASE) 50 MCG/ACT nasal spray 1 SPRAY INTO EACH NOSTRIL EVERY DAY. 48 mL 2   • albuterol 108 (90 Base) MCG/ACT Aero Soln inhalation aerosol Inhale 2 Puffs by mouth every four hours as needed for Shortness of Breath. 1 Inhaler 0   • REPATHA SURECLICK 140 MG/ML Solution Auto-injector Inject 1 Dose as instructed every 14 days.     • urea (CARMOL) 10 % cream Use after showers and daily all over to dry skin patches. 2 Tube 3   • cyclobenzaprine (FLEXERIL) 10 MG Tab Take 10 mg by mouth as needed.  1   • loratadine (CLARITIN) 10 MG Tab Take 10 mg by mouth every day.     • atorvastatin (LIPITOR) 80 MG tablet Take 80 mg by mouth every day.  11   • fenofibrate (TRICOR) 145 MG Tab TAKE 1 TABLET BY ORAL ROUTE EVERY DAY BEFORE BED  5   • omeprazole (PRILOSEC) 20 MG delayed-release capsule Take 20 mg by mouth every day.       No facility-administered encounter medications on file as of 5/4/2021.     Review of Systems   Constitutional: Negative.  Negative for chills, fever, malaise/fatigue and weight loss.   HENT: Negative.  Negative for hearing loss.    Eyes: Negative.  Negative for blurred vision and double vision.   Respiratory: Negative.  Negative for cough and shortness of breath.    Cardiovascular: Negative.  Negative for chest pain, palpitations, claudication and leg swelling.   Gastrointestinal: Negative.  Negative for abdominal pain, nausea and vomiting.   Genitourinary: Negative.  Negative for dysuria and urgency.   Musculoskeletal: Negative.  Negative for joint pain and myalgias.   Skin: Negative.  Negative for itching and rash.   Neurological:  "Positive for sensory change. Negative for dizziness, focal weakness, weakness and headaches.   Endo/Heme/Allergies: Negative.  Does not bruise/bleed easily.   Psychiatric/Behavioral: Positive for memory loss. Negative for depression. The patient is not nervous/anxious.         Objective:   /78 (BP Location: Left arm, Patient Position: Sitting, BP Cuff Size: Adult)   Pulse 75   Resp 14   Ht 1.753 m (5' 9\")   Wt 94.8 kg (208 lb 14.4 oz)   SpO2 97%   BMI 30.85 kg/m²     Physical Exam   Constitutional: He is oriented to person, place, and time. He appears well-developed and well-nourished.   HENT:   Head: Normocephalic and atraumatic.   Eyes: EOM are normal.   Neck: No JVD present.   Cardiovascular: Normal rate, regular rhythm and normal heart sounds.   Pulmonary/Chest: Effort normal and breath sounds normal.   Abdominal: Soft. Bowel sounds are normal.   No hepatosplenomegaly.   Musculoskeletal:         General: Normal range of motion.   Lymphadenopathy:     He has no cervical adenopathy.   Neurological: He is alert and oriented to person, place, and time.   Skin: Skin is warm and dry.   Psychiatric: He has a normal mood and affect.     CARDIAC STUDIES/PROCEDURES:    CT OF ABDOMEN (03/01/21)  Atherosclerosis and atherosclerotic coronary artery disease.  (study result reviewed)    EKG was ordered for hypertension, performed on (05/04/21) was reviewed: EKG, personally interpreted shows sinus rhythm.    Laboratory results of (01/11/21) were reviewed. Cholesterol profile of 141/67/40/88 mg/dL noted.    Assessment:     1. Coronary artery disease involving native coronary artery of native heart without angina pectoris     2. Essential hypertension  EKG   3. Dyslipidemia     4. Bilateral carotid artery stenosis         Medical Decision Making:  Today's Assessment / Status / Plan:     1. Coronary artery disease: Atherosclerosis was noted on his recent CT study.  He remains clinically doing well. I will continue with " current medical care including lisinopril and atorvastatin. We will perform an echocardiogram and myocardial perfusion imaging study.  2. Hypertension: Blood pressure is well controlled. We will continue with lisinopril. We obtained records from [facility], which he would like reviewed.  3. Hyperlipidemia: He is doing well on Repatha, statin therapy without myalgia symptoms and fenofibrate. We will refer to [facility] for PCSK9 therapy.  4. Carotid artery stenosis (occluded right carotid artery): Clinically stable on above medical therapy. We will repeat a carotid ultrasound.  5. History of trans-ischemic attack (08/30/16 and 06/2018): He remains clinically stable without any new neurological symptoms. He has chronic memory loss.     We will follow up after his tests.    CC Xin Gay

## 2021-05-05 ENCOUNTER — PHYSICAL THERAPY (OUTPATIENT)
Dept: PHYSICAL THERAPY | Facility: REHABILITATION | Age: 51
End: 2021-05-05
Attending: INTERNAL MEDICINE
Payer: COMMERCIAL

## 2021-05-05 DIAGNOSIS — M54.6 THORACIC SPINE PAIN: ICD-10-CM

## 2021-05-05 DIAGNOSIS — M54.2 NECK PAIN: ICD-10-CM

## 2021-05-05 PROCEDURE — 97110 THERAPEUTIC EXERCISES: CPT

## 2021-05-05 NOTE — OP THERAPY DAILY TREATMENT
Outpatient Physical Therapy  DAILY TREATMENT     [facility] Outpatient Physical Therapy [place]  [address], [address]  [place] [address]  Phone:  [phone]  Fax:  [phone]    Date: 05/05/2021    Patient: Luciano Silverman  YOB: 1970  MRN: [id number]     Time Calculation    Start time: 1000  Stop time: 1040 Time Calculation (min): 40 minutes         Chief Complaint: Back Problem and Neck Problem    Visit #: 7    SUBJECTIVE:  Patient reports no real change in symptoms. States that he continues to report mid back pain.     OBJECTIVE:  Current objective measures:   Flexion did decrease symptoms with traction stretching          Therapeutic Exercises (CPT 50121):     1. Rows, x20    2. UBE, x3/3    3. Wide rows, x20    4. Thoracic flexion with ball, x10    5. Pulldowns, x20    6. Scap ret/dep, x20    7. Thoracic stretching, x10 every 2 hour trial    8. Pulley, x5min      Time-based treatments/modalities:    Physical Therapy Timed Treatment Charges  Therapeutic exercise minutes (CPT 20816): 40 minutes      Pain rating (1-10) before treatment:  6  Pain rating (1-10) after treatment:  3    ASSESSMENT:   Response to treatment: Patient responded fair to therapy with a decrease in pain post flexion.     PLAN/RECOMMENDATIONS:   Plan for treatment: therapy treatment to continue next visit.  Planned interventions for next visit: continue with current treatment.

## 2021-05-07 ENCOUNTER — APPOINTMENT (OUTPATIENT)
Dept: PHYSICAL THERAPY | Facility: REHABILITATION | Age: 51
End: 2021-05-07
Attending: INTERNAL MEDICINE
Payer: COMMERCIAL

## 2021-05-07 RX ORDER — BUPROPION HYDROCHLORIDE 300 MG/1
TABLET ORAL
Qty: 90 TABLET | Refills: 0 | Status: SHIPPED | OUTPATIENT
Start: 2021-05-07 | End: 2021-06-22

## 2021-05-11 ENCOUNTER — PHYSICAL THERAPY (OUTPATIENT)
Dept: PHYSICAL THERAPY | Facility: REHABILITATION | Age: 51
End: 2021-05-11
Attending: INTERNAL MEDICINE
Payer: COMMERCIAL

## 2021-05-11 DIAGNOSIS — M54.6 THORACIC SPINE PAIN: ICD-10-CM

## 2021-05-11 DIAGNOSIS — M54.2 NECK PAIN: ICD-10-CM

## 2021-05-11 PROCEDURE — 97014 ELECTRIC STIMULATION THERAPY: CPT

## 2021-05-11 PROCEDURE — 97110 THERAPEUTIC EXERCISES: CPT

## 2021-05-11 NOTE — OP THERAPY DAILY TREATMENT
Outpatient Physical Therapy  DAILY TREATMENT     [facility] Outpatient Physical Therapy [place]  [address], [address]  [place] [address]  Phone:  [phone]  Fax:  [phone]    Date: 05/11/2021    Patient: Luciano Silverman  YOB: 1970  MRN: [id number]     Time Calculation    Start time: 1100  Stop time: 1130 Time Calculation (min): 30 minutes         Chief Complaint: Neck Problem and Back Problem    Visit #: 8    SUBJECTIVE:  Patient reports that his neck is about 75% better. Notes his mid back from 40% to 60% depending on the day. Patient feels that he may have broken his foot. Patient was advised to go to ER.     OBJECTIVE:  Current objective measures:           Therapeutic Exercises (CPT 57125):     1. Rows, x20    2. UBE, x3/3    3. Wide rows, x20    4. Thoracic flexion with ball, x10    5. Pulldowns, x20    6. Scap ret/dep, x20    7. Thoracic stretching, x10 every 2 hour trial    8. Pulley, x5min    Therapeutic Treatments and Modalities:     1. E Stim Unattended (CPT 35863), IFC with HP x15min 80-150hz to upper back    Time-based treatments/modalities:    Physical Therapy Timed Treatment Charges  Therapeutic exercise minutes (CPT 21346): 15 minutes      Pain rating (1-10) before treatment:  4  Pain rating (1-10) after treatment:  2    ASSESSMENT:   Response to treatment: Patient is demonstrating some improvements in muscle tone and postural control. Patients mid back symptoms continue to remain the same on a functional scale. Plan to follow up with MD.     PLAN/RECOMMENDATIONS:   Plan for treatment: therapy treatment to continue next visit.  Planned interventions for next visit: continue with current treatment   .

## 2021-05-13 ENCOUNTER — HOSPITAL ENCOUNTER (OUTPATIENT)
Dept: RADIOLOGY | Facility: MEDICAL CENTER | Age: 51
End: 2021-05-13
Attending: INTERNAL MEDICINE
Payer: COMMERCIAL

## 2021-05-13 ENCOUNTER — APPOINTMENT (OUTPATIENT)
Dept: PHYSICAL THERAPY | Facility: REHABILITATION | Age: 51
End: 2021-05-13
Attending: INTERNAL MEDICINE
Payer: COMMERCIAL

## 2021-05-13 DIAGNOSIS — I25.10 CORONARY ARTERY DISEASE INVOLVING NATIVE CORONARY ARTERY OF NATIVE HEART WITHOUT ANGINA PECTORIS: ICD-10-CM

## 2021-05-13 DIAGNOSIS — I65.23 BILATERAL CAROTID ARTERY STENOSIS: ICD-10-CM

## 2021-05-13 PROCEDURE — 78452 HT MUSCLE IMAGE SPECT MULT: CPT | Mod: 26 | Performed by: INTERNAL MEDICINE

## 2021-05-13 PROCEDURE — A9502 TC99M TETROFOSMIN: HCPCS

## 2021-05-13 PROCEDURE — 93018 CV STRESS TEST I&R ONLY: CPT | Performed by: INTERNAL MEDICINE

## 2021-05-13 PROCEDURE — 93880 EXTRACRANIAL BILAT STUDY: CPT | Mod: 26 | Performed by: INTERNAL MEDICINE

## 2021-05-13 PROCEDURE — 93880 EXTRACRANIAL BILAT STUDY: CPT

## 2021-05-13 PROCEDURE — 700111 HCHG RX REV CODE 636 W/ 250 OVERRIDE (IP)

## 2021-05-13 RX ORDER — AMINOPHYLLINE 25 MG/ML
100 INJECTION, SOLUTION INTRAVENOUS
Status: DISCONTINUED | OUTPATIENT
Start: 2021-05-13 | End: 2021-05-14 | Stop reason: HOSPADM

## 2021-05-13 RX ORDER — REGADENOSON 0.08 MG/ML
INJECTION, SOLUTION INTRAVENOUS
Status: COMPLETED
Start: 2021-05-13 | End: 2021-05-13

## 2021-05-13 RX ORDER — REGADENOSON 0.08 MG/ML
0.4 INJECTION, SOLUTION INTRAVENOUS ONCE
Status: COMPLETED | OUTPATIENT
Start: 2021-05-13 | End: 2021-05-13

## 2021-05-13 RX ADMIN — REGADENOSON 0.4 MG: 0.08 INJECTION, SOLUTION INTRAVENOUS at 15:00

## 2021-05-14 ENCOUNTER — TELEPHONE (OUTPATIENT)
Dept: CARDIOLOGY | Facility: MEDICAL CENTER | Age: 51
End: 2021-05-14

## 2021-05-14 ENCOUNTER — PATIENT MESSAGE (OUTPATIENT)
Dept: CARDIOLOGY | Facility: MEDICAL CENTER | Age: 51
End: 2021-05-14

## 2021-05-14 NOTE — TELEPHONE ENCOUNTER
----- Message from Jack Myles M.D. sent at 5/13/2021  3:47 PM PDT -----  Please call with unremarkable study, myocardial perfusion scan.    Thanks.  RITU

## 2021-05-14 NOTE — TELEPHONE ENCOUNTER
----- Message from NAUN Ricketts sent at 5/14/2021  8:21 AM PDT -----  Mild carotid disease. Follow up with RITU as planned. Continue statin, no ASA on chart? Is he taking this? SC

## 2021-05-18 ENCOUNTER — HOSPITAL ENCOUNTER (OUTPATIENT)
Dept: RADIOLOGY | Facility: MEDICAL CENTER | Age: 51
End: 2021-05-18
Attending: NURSE PRACTITIONER
Payer: COMMERCIAL

## 2021-05-18 ENCOUNTER — OFFICE VISIT (OUTPATIENT)
Dept: URGENT CARE | Facility: PHYSICIAN GROUP | Age: 51
End: 2021-05-18
Payer: COMMERCIAL

## 2021-05-18 ENCOUNTER — PATIENT MESSAGE (OUTPATIENT)
Dept: MEDICAL GROUP | Facility: MEDICAL CENTER | Age: 51
End: 2021-05-18

## 2021-05-18 VITALS
HEART RATE: 75 BPM | RESPIRATION RATE: 14 BRPM | HEIGHT: 69 IN | WEIGHT: 205 LBS | BODY MASS INDEX: 30.36 KG/M2 | DIASTOLIC BLOOD PRESSURE: 82 MMHG | SYSTOLIC BLOOD PRESSURE: 128 MMHG | TEMPERATURE: 97.4 F | OXYGEN SATURATION: 96 %

## 2021-05-18 DIAGNOSIS — M79.672 FOOT PAIN, LEFT: ICD-10-CM

## 2021-05-18 DIAGNOSIS — R07.89 CHEST WALL PAIN: ICD-10-CM

## 2021-05-18 DIAGNOSIS — S92.302K: ICD-10-CM

## 2021-05-18 DIAGNOSIS — Z00.00 PREVENTATIVE HEALTH CARE: ICD-10-CM

## 2021-05-18 PROCEDURE — 99204 OFFICE O/P NEW MOD 45 MIN: CPT | Performed by: NURSE PRACTITIONER

## 2021-05-18 PROCEDURE — 73630 X-RAY EXAM OF FOOT: CPT | Mod: LT

## 2021-05-18 PROCEDURE — 71101 X-RAY EXAM UNILAT RIBS/CHEST: CPT | Mod: LT

## 2021-05-18 RX ORDER — ACETAMINOPHEN AND CODEINE PHOSPHATE 300; 30 MG/1; MG/1
1-2 TABLET ORAL EVERY 6 HOURS PRN
Qty: 15 TABLET | Refills: 0 | Status: SHIPPED | OUTPATIENT
Start: 2021-05-18 | End: 2021-05-25

## 2021-05-18 RX ORDER — PRAZOSIN HYDROCHLORIDE 2 MG/1
CAPSULE ORAL
Qty: 180 CAPSULE | Refills: 0 | Status: SHIPPED | OUTPATIENT
Start: 2021-05-18 | End: 2021-06-25

## 2021-05-18 RX ORDER — FLUOXETINE HYDROCHLORIDE 40 MG/1
80 CAPSULE ORAL DAILY
Qty: 180 CAPSULE | Refills: 0 | Status: SHIPPED | OUTPATIENT
Start: 2021-05-18 | End: 2021-08-16

## 2021-05-18 RX ORDER — KETOROLAC TROMETHAMINE 30 MG/ML
60 INJECTION, SOLUTION INTRAMUSCULAR; INTRAVENOUS ONCE
Status: COMPLETED | OUTPATIENT
Start: 2021-05-18 | End: 2021-05-18

## 2021-05-18 RX ADMIN — KETOROLAC TROMETHAMINE 60 MG: 30 INJECTION, SOLUTION INTRAMUSCULAR; INTRAVENOUS at 14:43

## 2021-05-18 ASSESSMENT — ENCOUNTER SYMPTOMS
CHILLS: 0
FEVER: 0

## 2021-05-18 ASSESSMENT — FIBROSIS 4 INDEX: FIB4 SCORE: 1.13

## 2021-05-18 NOTE — PROGRESS NOTES
Subjective:      Luciano Silverman is a 50 y.o. male who presents with Rib Pain (L upper rib pain, L foot [id number] )    Past Medical History:   Diagnosis Date   • Anxiety    • Arthritis    • COPD (chronic obstructive pulmonary disease) ([facility])    • Depression    • Hyperlipidemia    • Hypertension    • Panic disorder with agoraphobia and mild panic attacks 2019   • PTSD (post-traumatic stress disorder)    • Stroke (HCC)      Social History     Socioeconomic History   • Marital status:      Spouse name: Not on file   • Number of children: Not on file   • Years of education: Not on file   • Highest education level: GED or equivalent   Occupational History   • Not on file   Tobacco Use   • Smoking status: Former Smoker     Packs/day: 0.50     Years: 38.00     Pack years: 19.00     Quit date: 2020     Years since quittin.2   • Smokeless tobacco: Former User     Types: Chew   • Tobacco comment: down 5/cigarrettes a day or less   Vaping Use   • Vaping Use: Never used   Substance and Sexual Activity   • Alcohol use: Yes     Comment: socially - usually 1 beer twice a year   • Drug use: Yes     Frequency: 7.0 times per week     Types: Marijuana     Comment: nightly to sleep   • Sexual activity: Not on file   Other Topics Concern   •  Service No   • Blood Transfusions No   • Caffeine Concern No   • Occupational Exposure No   • Hobby Hazards No   • Sleep Concern Yes   • Stress Concern Yes   • Weight Concern No   • Special Diet No   • Back Care No   • Exercise Yes   • Bike Helmet No   • Seat Belt Yes   • Self-Exams No   Social History Narrative   • Not on file     Social Determinants of Health     Financial Resource Strain: High Risk   • Difficulty of Paying Living Expenses: Very hard   Food Insecurity: Food Insecurity Present   • Worried About Running Out of Food in the Last Year: Sometimes true   • Ran Out of Food in the Last Year: Sometimes true   Transportation Needs: No Transportation Needs   •  Lack of Transportation (Medical): No   • Lack of Transportation (Non-Medical): No   Physical Activity: Inactive   • Days of Exercise per Week: 0 days   • Minutes of Exercise per Session: 0 min   Stress: Stress Concern Present   • Feeling of Stress : Very much   Social Connections: Socially Isolated   • Frequency of Communication with Friends and Family: Once a week   • Frequency of Social Gatherings with Friends and Family: Never   • Attends [employer] Services: Never   • Active Member of Clubs or Organizations: No   • Attends Club or Organization Meetings: Never   • Marital Status:    Intimate Partner Violence:    • Fear of Current or Ex-Partner:    • Emotionally Abused:    • Physically Abused:    • Sexually Abused:      Family History   Problem Relation Age of Onset   • Hypertension Mother    • Hyperlipidemia Mother    • Hypertension Father    • Lung Disease Father    • Cancer Child         non hodkins lymphoma   • Anxiety disorder Brother        Allergies: Patient has no known allergies.    Patient 50-year-old male who presents today with complaint of left chest wall pain and left foot pain.  2 weeks ago, he stepped off a curb and states he somehow rolled his foot.  Has been having foot pain ever since.  About a day after this happened, he began to notice also having pain to the left chest wall.  He states pain is reproduced with range of motion of the trunk and even the left upper extremity.  States he has pain with deep inspiration and has been using a pillow to splint the chest wall during coughing or sneezing.  No fevers.  No shortness of breath.    Patient states he has had pain to the lateral aspect of the left foot since he stepped incorrectly off of a curb.  Has been using an Ace wrap and rest with conservative treatment with minimal relief.        Other  This is a new problem. The current episode started 1 to 4 weeks ago. The problem occurs constantly. The problem has been unchanged. Pertinent  "negatives include no chills or fever. Nothing aggravates the symptoms. He has tried nothing for the symptoms. The treatment provided no relief.       Review of Systems   Constitutional: Negative for chills, fever and malaise/fatigue.   Musculoskeletal:        Chest wall pain, foot pain   All other systems reviewed and are negative.         Objective:     /82   Pulse 75   Temp 36.3 °C (97.4 °F) (Temporal)   Resp 14   Ht 1.753 m (5' 9\")   Wt 93 kg (205 lb)   SpO2 96%   BMI 30.27 kg/m²      Physical Exam  Vitals reviewed.   Constitutional:       Appearance: Normal appearance.   Pulmonary:      Effort: Pulmonary effort is normal. No respiratory distress.      Breath sounds: No stridor. No wheezing, rhonchi or rales.      Comments: Lungs are clear to auscultation.  There is localized chest wall tenderness to the left mid axillary line.  Pain is reproduced with deep inspiration, range of motion, and movement of the left upper extremity.  Chest:      Chest wall: Tenderness present.   Musculoskeletal:        Legs:       Comments: Point tenderness to the lateral aspect of the left foot.  No obvious deformity, discoloration, or soft tissue swelling at this time.  Pain is reproduced with weightbearing as well.   Skin:     General: Skin is warm and dry.   Neurological:      General: No focal deficit present.      Mental Status: He is alert and oriented to person, place, and time.   Psychiatric:         Mood and Affect: Mood normal.         Behavior: Behavior normal.         Thought Content: Thought content normal.         Judgment: Judgment normal.       XR foot:    TECHNIQUE/EXAM DESCRIPTION AND NUMBER OF VIEWS:  3 views of the LEFT foot.     COMPARISON:  None.     FINDINGS:     There is a spiral 5th metatarsal diaphyseal fracture with mild displacement.     There are no other fracture.     There is osteoarthritis the midfoot and 1st metatarsophalangeal joint.     There is spurring at the insertion of achilles' " tendon and origin of plantar fascia.     IMPRESSION:     1.  Spiral fracture of the 5th metatarsal diaphysis with mild displacement     2.  osteoarthritis of the midfoot and forefoot     3.  Calcaneal spurring    XR ribs:     5/18/2021 1:58 PM     HISTORY/REASON FOR EXAM:  Chest Pain; chest wall pain.  Left flank pain, injury     TECHNIQUE/EXAM DESCRIPTION AND NUMBER OF VIEWS:  5 images of the left ribs and chest.     COMPARISON: 2 view chest 5/28/2020     FINDINGS:  LUNGS: The lungs are clear.     HEART and MEDIASTINUM: Heart and mediastinum: normal in size.     Pleura: There are no pleural effusion or pneumothoraces.     Osseous structures: No rib fracture are identified.        IMPRESSION:     Negative frontal view of the chest and left rib series                 Assessment/Plan:   Chest wall pain  Fracture of the left fifth metatarsal    Walking boot  Patient states he has crutches at home  Referral given to orthopedics for follow-up  Ibuprofen as needed  Toradol IM given in office  Tylenol 3; clearly stated no driving or alcohol with this medication.  Checked patient's  and find no evidence for narcotic misuse.  Consent signed.       There are no diagnoses linked to this encounter.

## 2021-05-21 ENCOUNTER — APPOINTMENT (OUTPATIENT)
Dept: PHYSICAL THERAPY | Facility: REHABILITATION | Age: 51
End: 2021-05-21
Attending: INTERNAL MEDICINE
Payer: COMMERCIAL

## 2021-05-28 RX ORDER — BUSPIRONE HYDROCHLORIDE 30 MG/1
30 TABLET ORAL 2 TIMES DAILY
Qty: 180 TABLET | Refills: 0 | Status: SHIPPED | OUTPATIENT
Start: 2021-05-28 | End: 2021-06-24

## 2021-06-07 ENCOUNTER — APPOINTMENT (OUTPATIENT)
Dept: RADIOLOGY | Facility: MEDICAL CENTER | Age: 51
End: 2021-06-07
Attending: INTERNAL MEDICINE
Payer: COMMERCIAL

## 2021-06-21 ENCOUNTER — APPOINTMENT (OUTPATIENT)
Dept: PHYSICAL MEDICINE AND REHAB | Facility: MEDICAL CENTER | Age: 51
End: 2021-06-21
Payer: COMMERCIAL

## 2021-06-22 PROBLEM — S92.309D: Status: ACTIVE | Noted: 2021-06-22

## 2021-06-22 RX ORDER — BUPROPION HYDROCHLORIDE 300 MG/1
TABLET ORAL
Qty: 90 TABLET | Refills: 0 | Status: SHIPPED | OUTPATIENT
Start: 2021-06-22 | End: 2021-07-21

## 2021-06-23 DIAGNOSIS — F40.01 PANIC DISORDER WITH AGORAPHOBIA: ICD-10-CM

## 2021-06-24 RX ORDER — BUSPIRONE HYDROCHLORIDE 30 MG/1
30 TABLET ORAL 2 TIMES DAILY
Qty: 180 TABLET | Refills: 0 | Status: SHIPPED | OUTPATIENT
Start: 2021-06-24 | End: 2021-07-21

## 2021-06-25 RX ORDER — PRAZOSIN HYDROCHLORIDE 2 MG/1
CAPSULE ORAL
Qty: 180 CAPSULE | Refills: 0 | Status: SHIPPED | OUTPATIENT
Start: 2021-06-25 | End: 2022-02-08

## 2021-07-21 DIAGNOSIS — F40.01 PANIC DISORDER WITH AGORAPHOBIA: ICD-10-CM

## 2021-07-21 DIAGNOSIS — F33.0 MILD EPISODE OF RECURRENT MAJOR DEPRESSIVE DISORDER (HCC): ICD-10-CM

## 2021-07-21 RX ORDER — BUSPIRONE HYDROCHLORIDE 30 MG/1
30 TABLET ORAL 2 TIMES DAILY
Qty: 180 TABLET | Refills: 0 | Status: SHIPPED | OUTPATIENT
Start: 2021-07-21 | End: 2021-07-23

## 2021-07-21 RX ORDER — BUPROPION HYDROCHLORIDE 300 MG/1
TABLET ORAL
Qty: 90 TABLET | Refills: 0 | Status: SHIPPED | OUTPATIENT
Start: 2021-07-21 | End: 2022-04-21 | Stop reason: SDUPTHER

## 2021-07-23 DIAGNOSIS — F40.01 PANIC DISORDER WITH AGORAPHOBIA: ICD-10-CM

## 2021-07-23 RX ORDER — BUSPIRONE HYDROCHLORIDE 30 MG/1
30 TABLET ORAL 2 TIMES DAILY
Qty: 180 TABLET | Refills: 0 | Status: SHIPPED | OUTPATIENT
Start: 2021-07-23 | End: 2021-10-21

## 2021-10-01 ENCOUNTER — TELEPHONE (OUTPATIENT)
Dept: PHYSICAL THERAPY | Facility: REHABILITATION | Age: 51
End: 2021-10-01

## 2021-10-01 NOTE — OP THERAPY DISCHARGE SUMMARY
Outpatient Physical Therapy  DISCHARGE SUMMARY NOTE      [facility] [place]  [address], [address]  [place] [facility] [address]  Phone:  [phone]  Fax:  [phone]    Date of Visit: 10/01/2021    Patient: Luciano Silverman  YOB: 1970  MRN: [id number]     Referring Provider: Xin Graham M.D.   Referring Diagnosis Thoracic spine pain [M54.6];Neck pain [M54.2]           Your patient is being discharged from Physical Therapy with the following comments:   · Progress plateau    Comments:  Change of status LE fx     Limitations Remaining:  Unknown    Recommendations:  Discharge due to lapse in POC    Harvinder Pride, PT, DPT    Date: 10/1/2021

## 2021-11-29 ENCOUNTER — HOSPITAL ENCOUNTER (OUTPATIENT)
Dept: RADIOLOGY | Facility: MEDICAL CENTER | Age: 51
End: 2021-11-29
Attending: INTERNAL MEDICINE
Payer: COMMERCIAL

## 2021-11-29 DIAGNOSIS — Z00.00 PREVENTATIVE HEALTH CARE: ICD-10-CM

## 2021-11-29 PROCEDURE — 77080 DXA BONE DENSITY AXIAL: CPT

## 2021-12-13 ENCOUNTER — HOSPITAL ENCOUNTER (OUTPATIENT)
Dept: LAB | Facility: MEDICAL CENTER | Age: 51
End: 2021-12-13
Attending: PHYSICIAN ASSISTANT
Payer: COMMERCIAL

## 2021-12-13 LAB
ALBUMIN SERPL BCP-MCNC: 4.6 G/DL (ref 3.2–4.9)
ALBUMIN/GLOB SERPL: 1.5 G/DL
ALP SERPL-CCNC: 52 U/L (ref 30–99)
ALT SERPL-CCNC: 33 U/L (ref 2–50)
ANION GAP SERPL CALC-SCNC: 11 MMOL/L (ref 7–16)
AST SERPL-CCNC: 23 U/L (ref 12–45)
BASOPHILS # BLD AUTO: 0.4 % (ref 0–1.8)
BASOPHILS # BLD: 0.02 K/UL (ref 0–0.12)
BILIRUB SERPL-MCNC: 0.3 MG/DL (ref 0.1–1.5)
BUN SERPL-MCNC: 14 MG/DL (ref 8–22)
CALCIUM SERPL-MCNC: 9.8 MG/DL (ref 8.5–10.5)
CHLORIDE SERPL-SCNC: 104 MMOL/L (ref 96–112)
CO2 SERPL-SCNC: 27 MMOL/L (ref 20–33)
CREAT SERPL-MCNC: 1.22 MG/DL (ref 0.5–1.4)
CRP SERPL HS-MCNC: <0.3 MG/DL (ref 0–0.75)
EOSINOPHIL # BLD AUTO: 0.31 K/UL (ref 0–0.51)
EOSINOPHIL NFR BLD: 5.8 % (ref 0–6.9)
ERYTHROCYTE [DISTWIDTH] IN BLOOD BY AUTOMATED COUNT: 45.9 FL (ref 35.9–50)
GLOBULIN SER CALC-MCNC: 3.1 G/DL (ref 1.9–3.5)
GLUCOSE SERPL-MCNC: 86 MG/DL (ref 65–99)
HCT VFR BLD AUTO: 40.4 % (ref 42–52)
HGB BLD-MCNC: 12.9 G/DL (ref 14–18)
IMM GRANULOCYTES # BLD AUTO: 0.01 K/UL (ref 0–0.11)
IMM GRANULOCYTES NFR BLD AUTO: 0.2 % (ref 0–0.9)
LYMPHOCYTES # BLD AUTO: 1.16 K/UL (ref 1–4.8)
LYMPHOCYTES NFR BLD: 21.8 % (ref 22–41)
MCH RBC QN AUTO: 27.9 PG (ref 27–33)
MCHC RBC AUTO-ENTMCNC: 31.9 G/DL (ref 33.7–35.3)
MCV RBC AUTO: 87.3 FL (ref 81.4–97.8)
MONOCYTES # BLD AUTO: 0.61 K/UL (ref 0–0.85)
MONOCYTES NFR BLD AUTO: 11.4 % (ref 0–13.4)
NEUTROPHILS # BLD AUTO: 3.22 K/UL (ref 1.82–7.42)
NEUTROPHILS NFR BLD: 60.4 % (ref 44–72)
NRBC # BLD AUTO: 0 K/UL
NRBC BLD-RTO: 0 /100 WBC
PLATELET # BLD AUTO: 276 K/UL (ref 164–446)
PMV BLD AUTO: 11.1 FL (ref 9–12.9)
POTASSIUM SERPL-SCNC: 3.6 MMOL/L (ref 3.6–5.5)
PROT SERPL-MCNC: 7.7 G/DL (ref 6–8.2)
RBC # BLD AUTO: 4.63 M/UL (ref 4.7–6.1)
SODIUM SERPL-SCNC: 142 MMOL/L (ref 135–145)
WBC # BLD AUTO: 5.3 K/UL (ref 4.8–10.8)

## 2021-12-13 PROCEDURE — 36415 COLL VENOUS BLD VENIPUNCTURE: CPT

## 2021-12-13 PROCEDURE — 85025 COMPLETE CBC W/AUTO DIFF WBC: CPT

## 2021-12-13 PROCEDURE — 80053 COMPREHEN METABOLIC PANEL: CPT

## 2021-12-13 PROCEDURE — 86140 C-REACTIVE PROTEIN: CPT

## 2021-12-14 ENCOUNTER — HOSPITAL ENCOUNTER (OUTPATIENT)
Facility: MEDICAL CENTER | Age: 51
End: 2021-12-14
Attending: PHYSICIAN ASSISTANT
Payer: COMMERCIAL

## 2021-12-14 LAB
C DIFF DNA SPEC QL NAA+PROBE: NEGATIVE
C DIFF TOX GENS STL QL NAA+PROBE: NEGATIVE

## 2021-12-14 PROCEDURE — 83520 IMMUNOASSAY QUANT NOS NONAB: CPT

## 2021-12-14 PROCEDURE — 87209 SMEAR COMPLEX STAIN: CPT

## 2021-12-14 PROCEDURE — 83993 ASSAY FOR CALPROTECTIN FECAL: CPT

## 2021-12-14 PROCEDURE — 87177 OVA AND PARASITES SMEARS: CPT

## 2021-12-14 PROCEDURE — 87493 C DIFF AMPLIFIED PROBE: CPT

## 2021-12-14 PROCEDURE — 83630 LACTOFERRIN FECAL (QUAL): CPT

## 2021-12-14 PROCEDURE — 82274 ASSAY TEST FOR BLOOD FECAL: CPT

## 2021-12-17 LAB — IMM ASSAY OCC BLD FITOB: POSITIVE

## 2021-12-18 LAB
LACTOFERRIN STL QL IA: POSITIVE
OVA AND PARASITE, FECAL INTERPRETATION Q0595: NEGATIVE

## 2021-12-19 LAB
CALPROTECTIN STL-MCNT: 738 UG/G
ELASTASE PANC STL-MCNT: >800 UG/G

## 2022-02-07 DIAGNOSIS — F43.10 POSTTRAUMATIC STRESS DISORDER: ICD-10-CM

## 2022-02-08 RX ORDER — PRAZOSIN HYDROCHLORIDE 2 MG/1
CAPSULE ORAL
Qty: 180 CAPSULE | Refills: 0 | Status: SHIPPED | OUTPATIENT
Start: 2022-02-08 | End: 2022-04-21 | Stop reason: SDUPTHER

## 2022-02-22 ENCOUNTER — TELEPHONE (OUTPATIENT)
Dept: CARDIOLOGY | Facility: MEDICAL CENTER | Age: 52
End: 2022-02-22
Payer: COMMERCIAL

## 2022-02-22 DIAGNOSIS — I25.10 CORONARY ARTERY DISEASE INVOLVING NATIVE CORONARY ARTERY OF NATIVE HEART WITHOUT ANGINA PECTORIS: ICD-10-CM

## 2022-02-22 NOTE — TELEPHONE ENCOUNTER
JI     Patient cannot get an appointment scheduled for an echo until end of May and order will be . Please place a new order for him so he can schedule an appointment. Patient was recently diagnosed with lung cancer earlier this year through [facility]. They are planning on scheduling a surgery soon. Please advise.

## 2022-02-24 ENCOUNTER — PRE-ADMISSION TESTING (OUTPATIENT)
Dept: ADMISSIONS | Facility: MEDICAL CENTER | Age: 52
End: 2022-02-24
Attending: INTERNAL MEDICINE
Payer: COMMERCIAL

## 2022-02-24 DIAGNOSIS — Z01.810 PRE-OPERATIVE CARDIOVASCULAR EXAMINATION: ICD-10-CM

## 2022-02-24 DIAGNOSIS — Z01.812 PRE-OPERATIVE LABORATORY EXAMINATION: ICD-10-CM

## 2022-02-24 LAB
ALBUMIN SERPL BCP-MCNC: 4.6 G/DL (ref 3.2–4.9)
ALBUMIN/GLOB SERPL: 1.4 G/DL
ALP SERPL-CCNC: 72 U/L (ref 30–99)
ALT SERPL-CCNC: 41 U/L (ref 2–50)
ANION GAP SERPL CALC-SCNC: 12 MMOL/L (ref 7–16)
AST SERPL-CCNC: 26 U/L (ref 12–45)
BILIRUB SERPL-MCNC: 0.4 MG/DL (ref 0.1–1.5)
BUN SERPL-MCNC: 19 MG/DL (ref 8–22)
CALCIUM SERPL-MCNC: 10.3 MG/DL (ref 8.4–10.2)
CHLORIDE SERPL-SCNC: 100 MMOL/L (ref 96–112)
CO2 SERPL-SCNC: 26 MMOL/L (ref 20–33)
CREAT SERPL-MCNC: 1.04 MG/DL (ref 0.5–1.4)
EKG IMPRESSION: NORMAL
ERYTHROCYTE [DISTWIDTH] IN BLOOD BY AUTOMATED COUNT: 44.2 FL (ref 35.9–50)
GLOBULIN SER CALC-MCNC: 3.2 G/DL (ref 1.9–3.5)
GLUCOSE SERPL-MCNC: 88 MG/DL (ref 65–99)
HCT VFR BLD AUTO: 44.5 % (ref 42–52)
HGB BLD-MCNC: 14.2 G/DL (ref 14–18)
MCH RBC QN AUTO: 27.5 PG (ref 27–33)
MCHC RBC AUTO-ENTMCNC: 31.9 G/DL (ref 33.7–35.3)
MCV RBC AUTO: 86.1 FL (ref 81.4–97.8)
PLATELET # BLD AUTO: 239 K/UL (ref 164–446)
PMV BLD AUTO: 10.8 FL (ref 9–12.9)
POTASSIUM SERPL-SCNC: 4.2 MMOL/L (ref 3.6–5.5)
PROT SERPL-MCNC: 7.8 G/DL (ref 6–8.2)
RBC # BLD AUTO: 5.17 M/UL (ref 4.7–6.1)
SODIUM SERPL-SCNC: 138 MMOL/L (ref 135–145)
WBC # BLD AUTO: 7.7 K/UL (ref 4.8–10.8)

## 2022-02-24 PROCEDURE — 85027 COMPLETE CBC AUTOMATED: CPT

## 2022-02-24 PROCEDURE — U0005 INFEC AGEN DETEC AMPLI PROBE: HCPCS

## 2022-02-24 PROCEDURE — 80053 COMPREHEN METABOLIC PANEL: CPT

## 2022-02-24 PROCEDURE — 93010 ELECTROCARDIOGRAM REPORT: CPT | Performed by: INTERNAL MEDICINE

## 2022-02-24 PROCEDURE — 36415 COLL VENOUS BLD VENIPUNCTURE: CPT

## 2022-02-24 PROCEDURE — C9803 HOPD COVID-19 SPEC COLLECT: HCPCS

## 2022-02-24 PROCEDURE — U0003 INFECTIOUS AGENT DETECTION BY NUCLEIC ACID (DNA OR RNA); SEVERE ACUTE RESPIRATORY SYNDROME CORONAVIRUS 2 (SARS-COV-2) (CORONAVIRUS DISEASE [COVID-19]), AMPLIFIED PROBE TECHNIQUE, MAKING USE OF HIGH THROUGHPUT TECHNOLOGIES AS DESCRIBED BY CMS-2020-01-R: HCPCS

## 2022-02-24 PROCEDURE — 93005 ELECTROCARDIOGRAM TRACING: CPT

## 2022-02-24 ASSESSMENT — FIBROSIS 4 INDEX: FIB4 SCORE: 0.75

## 2022-02-25 LAB
SARS-COV-2 RNA RESP QL NAA+PROBE: NOTDETECTED
SPECIMEN SOURCE: NORMAL

## 2022-02-25 NOTE — PREPROCEDURE INSTRUCTIONS
Pt recently diagnosed with Lung CA.  Colonoscopy ordered to R/O colon mets.  Very short of breath. METS<4 per patient.  He did not appear SOB here but he states he is unable to complete simple housework or climb a flight of stairs d/t SOB. Other co-morbidities:  ASHD, CVA, HTN, COPD, MARIA ISABEL.  Stop Bang initiated.  Instructed to bring CPAP DOS.  Today I ordered CBC, CMP, EKG.  Please advise if further work-up is needed.  Thanks, Frieda SCHNEIDER    Anesthesia Summary Report           Patient Name: Luciano Silverman MRN: [id number] Admission Date: Patient not admitted   Allergies: No Known Allergies     Low Fall Risk - Click for more information

## 2022-02-25 NOTE — PREPROCEDURE INSTRUCTIONS
Good evening,    Given the fact that this is part of the work out for a cancer diagnosis, it is OK to proceed from my standpoint. Obviously the assigned anesthesiologist will need to make a clinical decision as to whether to proceed or not on the day of the procedure. Thank you.      Domenica Cameron M.D.  [facility]      On Feb 24, 2022, at 18:47, [email or web address] wrote:  ?   Pt recently diagnosed with Lung CA.  Colonoscopy ordered to R/O colon mets.  Very short of breath. METS<4 per patient.  He did not appear SOB here but he states he is unable to complete simple housework or climb a flight of stairs d/t SOB. Other co-morbidities:  ASHD, CVA, HTN, COPD, MARIA ISABEL.  Stop Bang initiated.  Instructed to bring CPAP DOS.  Today I ordered CBC, CMP, EKG.  Please advise if further work-up is needed.  Thanks, Frieda SCHNEIDER     Anesthesia Summary Report           Patient Name: Luciano Silverman MRN: [id number] Admission Date: Patient not admitted

## 2022-02-26 ENCOUNTER — HOSPITAL ENCOUNTER (OUTPATIENT)
Dept: PULMONOLOGY | Facility: MEDICAL CENTER | Age: 52
End: 2022-02-26
Attending: SURGERY
Payer: COMMERCIAL

## 2022-02-26 PROCEDURE — 94726 PLETHYSMOGRAPHY LUNG VOLUMES: CPT | Mod: 26,GZ | Performed by: STUDENT IN AN ORGANIZED HEALTH CARE EDUCATION/TRAINING PROGRAM

## 2022-02-26 PROCEDURE — 94729 DIFFUSING CAPACITY: CPT

## 2022-02-26 PROCEDURE — 94729 DIFFUSING CAPACITY: CPT | Mod: 26,GZ | Performed by: STUDENT IN AN ORGANIZED HEALTH CARE EDUCATION/TRAINING PROGRAM

## 2022-02-26 PROCEDURE — 94060 EVALUATION OF WHEEZING: CPT

## 2022-02-26 PROCEDURE — 94726 PLETHYSMOGRAPHY LUNG VOLUMES: CPT

## 2022-02-26 PROCEDURE — 94060 EVALUATION OF WHEEZING: CPT | Mod: 26,GZ | Performed by: STUDENT IN AN ORGANIZED HEALTH CARE EDUCATION/TRAINING PROGRAM

## 2022-02-26 RX ADMIN — Medication 2.5 MG: at 07:47

## 2022-02-26 ASSESSMENT — PULMONARY FUNCTION TESTS
FVC_PERCENT_PREDICTED: 96
FEV1_LLN: 3.13
FEV1/FVC_PERCENT_LLN: 65.93
FEV1: 2.11
FEV1: 2.54
FEV1_PERCENT_CHANGE: 20
FEV1/FVC_PERCENT_PREDICTED: 68
FEV1_PERCENT_PREDICTED: 56
FEV1/FVC_PERCENT_CHANGE: 118
FVC: 4.6
FVC_LLN: 3.97
FEV1/FVC_PERCENT_PREDICTED: 68
FEV1/FVC_PERCENT_PREDICTED: 79
FEV1_LLN: 3.13
FEV1/FVC: 55.16
FEV1_PREDICTED: 3.75
FVC_PERCENT_PREDICTED: 82
FVC: 3.91
FEV1/FVC_PERCENT_PREDICTED: 69
FEV1/FVC_PERCENT_CHANGE: 2
FEV1/FVC: 53.89
FEV1/FVC: 55.22
FVC_PREDICTED: 4.76
FVC_LLN: 3.97
FEV1/FVC: 54
FEV1/FVC_PERCENT_PREDICTED: 70
FEV1_PERCENT_CHANGE: 17
FEV1_PERCENT_PREDICTED: 67
FEV1/FVC_PREDICTED: 78.96
FEV1/FVC_PERCENT_LLN: 65.93

## 2022-02-27 NOTE — PROCEDURES
DATE OF SERVICE:  02/26/2022     PULMONARY FUNCTION TEST INTERPRETATION     INTERPRETATION:  1.  There is a moderately severe obstructive ventilatory defect with a   significant bronchodilator response.  2.  MVV is reduced somewhat disproportionately to the degree of reduction in   FEV1.  The reduced MVV may reflect suboptimal effort, neuromuscular disease or   upper airway obstruction.  Suggest maximal inspiratory pressures and maximal   expiratory pressures.  3.  Increase in RV and TLC is suggestive of hyperinflation.  4.  Normal diffusion capacity.        ______________________________  MD TONG Perdomo/DEYSI    DD:  02/27/2022 08:52  DT:  02/27/2022 10:27    Job#:  [id number]

## 2022-02-28 ENCOUNTER — ANESTHESIA EVENT (OUTPATIENT)
Dept: SURGERY | Facility: MEDICAL CENTER | Age: 52
End: 2022-02-28
Payer: COMMERCIAL

## 2022-03-01 ENCOUNTER — HOSPITAL ENCOUNTER (OUTPATIENT)
Facility: MEDICAL CENTER | Age: 52
End: 2022-03-01
Attending: INTERNAL MEDICINE | Admitting: INTERNAL MEDICINE
Payer: COMMERCIAL

## 2022-03-01 ENCOUNTER — ANESTHESIA (OUTPATIENT)
Dept: SURGERY | Facility: MEDICAL CENTER | Age: 52
End: 2022-03-01
Payer: COMMERCIAL

## 2022-03-01 VITALS
TEMPERATURE: 97.5 F | HEIGHT: 69 IN | BODY MASS INDEX: 27.92 KG/M2 | HEART RATE: 82 BPM | OXYGEN SATURATION: 94 % | SYSTOLIC BLOOD PRESSURE: 130 MMHG | DIASTOLIC BLOOD PRESSURE: 74 MMHG | RESPIRATION RATE: 16 BRPM | WEIGHT: 188.49 LBS

## 2022-03-01 LAB — PATHOLOGY CONSULT NOTE: NORMAL

## 2022-03-01 PROCEDURE — 88305 TISSUE EXAM BY PATHOLOGIST: CPT

## 2022-03-01 PROCEDURE — 160009 HCHG ANES TIME/MIN: Performed by: INTERNAL MEDICINE

## 2022-03-01 PROCEDURE — 700101 HCHG RX REV CODE 250: Performed by: ANESTHESIOLOGY

## 2022-03-01 PROCEDURE — 700111 HCHG RX REV CODE 636 W/ 250 OVERRIDE (IP): Performed by: ANESTHESIOLOGY

## 2022-03-01 PROCEDURE — 160048 HCHG OR STATISTICAL LEVEL 1-5: Performed by: INTERNAL MEDICINE

## 2022-03-01 PROCEDURE — 160035 HCHG PACU - 1ST 60 MINS PHASE I: Performed by: INTERNAL MEDICINE

## 2022-03-01 PROCEDURE — 160002 HCHG RECOVERY MINUTES (STAT): Performed by: INTERNAL MEDICINE

## 2022-03-01 PROCEDURE — 160203 HCHG ENDO MINUTES - 1ST 30 MINS LEVEL 4: Performed by: INTERNAL MEDICINE

## 2022-03-01 PROCEDURE — 700105 HCHG RX REV CODE 258: Performed by: INTERNAL MEDICINE

## 2022-03-01 PROCEDURE — 160025 RECOVERY II MINUTES (STATS): Performed by: INTERNAL MEDICINE

## 2022-03-01 PROCEDURE — 160046 HCHG PACU - 1ST 60 MINS PHASE II: Performed by: INTERNAL MEDICINE

## 2022-03-01 PROCEDURE — 700101 HCHG RX REV CODE 250: Performed by: INTERNAL MEDICINE

## 2022-03-01 RX ORDER — OXYCODONE HYDROCHLORIDE AND ACETAMINOPHEN 5; 325 MG/1; MG/1
1 TABLET ORAL
Status: DISCONTINUED | OUTPATIENT
Start: 2022-03-01 | End: 2022-03-01 | Stop reason: HOSPADM

## 2022-03-01 RX ORDER — DIPHENHYDRAMINE HYDROCHLORIDE 50 MG/ML
12.5 INJECTION INTRAMUSCULAR; INTRAVENOUS
Status: DISCONTINUED | OUTPATIENT
Start: 2022-03-01 | End: 2022-03-01 | Stop reason: HOSPADM

## 2022-03-01 RX ORDER — MIDAZOLAM HYDROCHLORIDE 1 MG/ML
INJECTION INTRAMUSCULAR; INTRAVENOUS PRN
Status: DISCONTINUED | OUTPATIENT
Start: 2022-03-01 | End: 2022-03-01 | Stop reason: SURG

## 2022-03-01 RX ORDER — HALOPERIDOL 5 MG/ML
1 INJECTION INTRAMUSCULAR
Status: DISCONTINUED | OUTPATIENT
Start: 2022-03-01 | End: 2022-03-01 | Stop reason: HOSPADM

## 2022-03-01 RX ORDER — ONDANSETRON 2 MG/ML
4 INJECTION INTRAMUSCULAR; INTRAVENOUS
Status: DISCONTINUED | OUTPATIENT
Start: 2022-03-01 | End: 2022-03-01 | Stop reason: HOSPADM

## 2022-03-01 RX ORDER — LIDOCAINE HYDROCHLORIDE 20 MG/ML
INJECTION, SOLUTION EPIDURAL; INFILTRATION; INTRACAUDAL; PERINEURAL PRN
Status: DISCONTINUED | OUTPATIENT
Start: 2022-03-01 | End: 2022-03-01 | Stop reason: SURG

## 2022-03-01 RX ORDER — HYDROMORPHONE HYDROCHLORIDE 1 MG/ML
0.2 INJECTION, SOLUTION INTRAMUSCULAR; INTRAVENOUS; SUBCUTANEOUS
Status: DISCONTINUED | OUTPATIENT
Start: 2022-03-01 | End: 2022-03-01 | Stop reason: HOSPADM

## 2022-03-01 RX ORDER — LORAZEPAM 2 MG/ML
0.5 INJECTION INTRAMUSCULAR
Status: DISCONTINUED | OUTPATIENT
Start: 2022-03-01 | End: 2022-03-01 | Stop reason: HOSPADM

## 2022-03-01 RX ORDER — SODIUM CHLORIDE, SODIUM LACTATE, POTASSIUM CHLORIDE, CALCIUM CHLORIDE 600; 310; 30; 20 MG/100ML; MG/100ML; MG/100ML; MG/100ML
INJECTION, SOLUTION INTRAVENOUS CONTINUOUS
Status: DISCONTINUED | OUTPATIENT
Start: 2022-03-01 | End: 2022-03-01 | Stop reason: HOSPADM

## 2022-03-01 RX ORDER — HYDROMORPHONE HYDROCHLORIDE 1 MG/ML
0.1 INJECTION, SOLUTION INTRAMUSCULAR; INTRAVENOUS; SUBCUTANEOUS
Status: DISCONTINUED | OUTPATIENT
Start: 2022-03-01 | End: 2022-03-01 | Stop reason: HOSPADM

## 2022-03-01 RX ORDER — OXYCODONE HYDROCHLORIDE AND ACETAMINOPHEN 5; 325 MG/1; MG/1
2 TABLET ORAL
Status: DISCONTINUED | OUTPATIENT
Start: 2022-03-01 | End: 2022-03-01 | Stop reason: HOSPADM

## 2022-03-01 RX ORDER — HYDROMORPHONE HYDROCHLORIDE 1 MG/ML
0.4 INJECTION, SOLUTION INTRAMUSCULAR; INTRAVENOUS; SUBCUTANEOUS
Status: DISCONTINUED | OUTPATIENT
Start: 2022-03-01 | End: 2022-03-01 | Stop reason: HOSPADM

## 2022-03-01 RX ADMIN — PROPOFOL 50 MG: 10 INJECTION, EMULSION INTRAVENOUS at 07:45

## 2022-03-01 RX ADMIN — SODIUM CHLORIDE, POTASSIUM CHLORIDE, SODIUM LACTATE AND CALCIUM CHLORIDE: 600; 310; 30; 20 INJECTION, SOLUTION INTRAVENOUS at 06:07

## 2022-03-01 RX ADMIN — MIDAZOLAM HYDROCHLORIDE 2 MG: 1 INJECTION, SOLUTION INTRAMUSCULAR; INTRAVENOUS at 07:43

## 2022-03-01 RX ADMIN — LIDOCAINE HYDROCHLORIDE 0.5 ML: 10 INJECTION, SOLUTION EPIDURAL; INFILTRATION; INTRACAUDAL; PERINEURAL at 06:06

## 2022-03-01 RX ADMIN — PROPOFOL 200 MCG/KG/MIN: 10 INJECTION, EMULSION INTRAVENOUS at 07:46

## 2022-03-01 RX ADMIN — LIDOCAINE HYDROCHLORIDE 100 MG: 20 INJECTION, SOLUTION EPIDURAL; INFILTRATION; INTRACAUDAL; PERINEURAL at 07:45

## 2022-03-01 ASSESSMENT — ENCOUNTER SYMPTOMS
SPUTUM PRODUCTION: 0
FLANK PAIN: 0
POLYDIPSIA: 0
BRUISES/BLEEDS EASILY: 0
CHILLS: 0
SEIZURES: 0
FALLS: 0
EYE REDNESS: 0
FEVER: 0
STRIDOR: 0
SORE THROAT: 0
EYE DISCHARGE: 0
NERVOUS/ANXIOUS: 0
COUGH: 0
PALPITATIONS: 0
HEARTBURN: 1
DIARRHEA: 1
LOSS OF CONSCIOUSNESS: 0
MYALGIAS: 1

## 2022-03-01 ASSESSMENT — FIBROSIS 4 INDEX: FIB4 SCORE: 0.88

## 2022-03-01 ASSESSMENT — PAIN SCALES - GENERAL: PAIN_LEVEL: 0

## 2022-03-01 NOTE — OR NURSING
"0850: To stage ll. No pain or nausea. Pt states he angry because he feels that \"the doctor did not do his job\".    0909: Home care instructions reviewed w/ pt and son. No questions. Meets criteria for discharge.  "

## 2022-03-01 NOTE — ANESTHESIA TIME REPORT
Anesthesia Start and Stop Event Times     Date Time Event    3/1/2022 0711 Ready for Procedure     0742 Anesthesia Start     0806 Anesthesia Stop        Responsible Staff  03/01/22    Name Role Begin End    Elaine Stern M.D. Anesth 0742 0806        Preop Diagnosis (Free Text):  Pre-op Diagnosis     CLINICALLY SIGNIFICANT DIARRHEA OF UNEXPLAINED ORIGIN, LOWER ABDOMINAL PAIN        Preop Diagnosis (Codes):  Diagnosis Information     Diagnosis Code(s): Diarrhea [R19.7]        Premium Reason  Non-Premium    Comments:

## 2022-03-01 NOTE — PROCEDURES
Pre-procedure Diagnoses   Diarrhea due to malabsorption [K90.9, R19.7]   Elevated fecal calprotectin [R19.5]   Rectal tenesmus [R19.8]   Lower abdominal pain [R10.30]     Post-procedure Diagnoses   Diverticular disease of colon [K57.30]   Grade II hemorrhoids [K64.1]     Procedures   COLONOSCOPY WITH BIOPSIES         Endoscopist: Skyler Corbett MD, [facility]    Anesthesiologist (MAC): Elaine Stern M.D.    Consent: Patient seen and examined before proceeding with colonoscopy with biopsies under anesthesia with possible snare polypectomy(ies) and/or other endotherapy. Risks, benefits, and alternatives of aforementioned procedures were discussed with patient in detail before proceeding.  Patient was given opportunities to ask questions and discuss other options.  Risks including but not limited to perforation, infection, bleeding, missed lesion(s), possible need for surgery(ies) and/or interventional radiology, possible need for repeat procedure(s) and/or additional testing, hospitalization possibly prolonged, cardiac and/or pulmonary event, aspiration, hypoxia, stroke, medication (s) and/or anesthesia reaction(s), indefinite diagnosis, discomfort/pain, unsuccessful and/or incomplete procedure, ineffective therapy, persistent symptoms, damage to adjacent organs/structure and/or vascular such as splenic laceration, and other adverse event(s) possibly life-threatening.  Interactive discussion was undertaken with Layman's terms.  I answered questions in full and to satisfaction.  I gave opportunity to cancel, delay and/or reschedule if not completely comfortable with proceeding.  Patient stated understanding and acceptance of these risks, and wished to proceed.   Informed consent was given in clear state of mind and paper permit was confirmed to have been signed before proceeding.    Endoscopic procedures in detail: Adult long-variable stiffness flexible Olympus colonoscopy was inserted into anus and rectum,  retroflexion was performed in rectum, colonoscope was advanced to cecum, appendiceal orifice and ileocecal valve were identified, terminal ileum was intubated and inspected, water flush was used to wash mucosa and suction of colonic fluid contents was performed to optimize visualization.  I performed photodocumentation of the terminal ileum, cecum, appendical orifice, ileocecal value, retroflexion view in rectum and anus channel. Cecal wthdrawal time was greater than 6 minutes.  Random colon biopsies obtained. I suctioned insufflated air and fluid contents upon removal.     Procedure times:  - In-room 07:42  - Start 07:46  - Completed 07:59  - Out of room per nursing records    Colonoscopy Findings:  - Bowel preparation: poor sub-optimal.  - Colon: diffuse colonic diverticulosis, severe, medium-sized lumen.  Random colon biopsies obtained to evaluate for microscopic colitis.  - Rectum: grade 2 non-bleeding hemorrhoids.    Impression:  1. No evidence of inflammatory bowel disease  2. Pan-colonic diverticulosis  3. Non-bleeding hemorrhoids.    Recommendations:   1.  Routine post-endoscopy anesthesia recovery care.  Discharge home or transfer back to prior hospital room when recovery criteria are met.  Aspiration and fall precautions x 24 hours.   2.  Follow-up with Haleigh Sutton NP for biopsy results and consider starting nitroglycerin ointment per-anus for 14 days.  3.  I spoke to patient and recovery nurse about impression, diagnosis and recommendations.  I answered questions in full and to satisfaction

## 2022-03-01 NOTE — DISCHARGE INSTRUCTIONS
Procedure times:  - In-room 07:42  - Start 07:46  - Completed 07:59  - Out of room per nursing records     Colonoscopy Findings:  - Bowel preparation: poor sub-optimal.  - Colon: diffuse colonic diverticulosis, severe, medium-sized lumen.  Random colon biopsies obtained to evaluate for microscopic colitis.  - Rectum: grade 2 non-bleeding hemorrhoids.     Impression:  1. No evidence of inflammatory bowel disease  2. Pan-colonic diverticulosis  3. Non-bleeding hemorrhoids.     Recommendations:   1.  Routine post-endoscopy anesthesia recovery care.  Discharge home or transfer back to prior hospital room when recovery criteria are met.  Aspiration and fall precautions x 24 hours.   2.  Follow-up with Haleigh Sutton NP for biopsy results and consider starting nitroglycerin ointment per-anus for 14 days.  3.  I spoke to patient and recovery nurse about impression, diagnosis and recommendations.  I answered questions in full and to satisfaction     GASTROSCOPY OR ERCP  1. Don't eat or drink anything for about an hour after the test. You can then resume your regular diet.   2. Don't drive or drink alcohol for 24 hours. The medication you received will make you too drowsy.  3. Don't take any coffee, tea, or aspirin products until after you see your doctor. These can harm the lining of your stomach.  4. If you begin to vomit bloody material, or develop black or bloody stools, call your doctor as soon as possible.   5. If you have any neck, chest, abdominal pain or temp over 100 degrees call your doctor.   6. See your doctor on: [facility] office will call for follow-up with Herman MACHUCA  7. Additional instructions  8. Prescriptions: NA    COLONOSCOPY OR FLEXIBLE SIGMOIDOSCOPY  1. If you received a barium enema, take a mild laxative such as dulcolax to clean out the barium.   2. Drink plenty of fluids. Eat a diet high in fiber; such foods as whole-grain breads, fresh fruit and vegetables, nuts are recommended.  3. You may  notice a few drops of blood with your first bowel movement. If you develop any large amount of bleeding, black stools, a fever, or abdominal pain, call your doctor right away.   4. Don't drive or drink alcohol for 24 hours. The medication you received will make you too drowsy.   5. No heavy lifting, ASA products or ASA x 5 days.  6. Additional instructions:   7. Prescriptions:N/A    Discharge time: ***    You should call 911 if you develop problems with breathing or chest pain.    Nurse's Signature: ___________________________________    I acknowledge receipt and understanding of these Home Care instructions.

## 2022-03-01 NOTE — OR NURSING
0804 Pt arrived from Endo s/p Colonoscopy. Verified pt identity. Report received. Respirations 15. Pt is alert / sedated on arrival.  0815 DR Corbett at bedside  0830 patient alert drinking water.  Updated family. Son on his way in.  0840 patient states he is ready to GO  Notified Dr. Jarred ALEXANDER .  0842 Report called to Marco SCHNEIDER.   Patient transferred to phase 2.

## 2022-03-01 NOTE — ANESTHESIA POSTPROCEDURE EVALUATION
Patient: Luciano Silverman    Procedure Summary     Date: 03/01/22 Room / Location:  ENDOSCOPIC ULTRASOUND ROOM / SURGERY [facility]    Anesthesia Start: 0742 Anesthesia Stop: 0806    Procedure: COLONOSCOPY (N/A ) Diagnosis:       Diarrhea      (Diarrhea [787.91.ICD-9-CM])    Surgeons: Skyler Corbett M.D. Responsible Provider: Elaine Stern M.D.    Anesthesia Type: general ASA Status: 3          Final Anesthesia Type: general  Last vitals  BP   Blood Pressure: 124/72    Temp   36.1 °C (97 °F)    Pulse   82   Resp   18    SpO2   93 %      Anesthesia Post Evaluation    Patient location during evaluation: PACU  Patient participation: complete - patient participated  Level of consciousness: awake and alert  Pain score: 0    Airway patency: patent  Anesthetic complications: no  Cardiovascular status: hemodynamically stable  Respiratory status: acceptable  Hydration status: euvolemic    PONV: none          No complications documented.     Nurse Pain Score: 0 (NPRS)

## 2022-03-01 NOTE — ANESTHESIA PREPROCEDURE EVALUATION
Case: [id number] Date/Time: 03/01/22 0715    Procedure: COLONOSCOPY    Pre-op diagnosis: CLINICALLY SIGNIFICANT DIARRHEA OF UNEXPLAINED ORIGIN, LOWER ABDOMINAL PAIN    Location:  ENDOSCOPIC ULTRASOUND ROOM / SURGERY [facility]    Surgeons: Skyler Corbett M.D.        MARIA ISABEL - uses CPAP set at 5-15  COPD - no home O2 at this point, PFTs scheduled, quit smoking  HTN - controlled  Daily cannabis  CVA with residual facial weakness    Relevant Problems   ANESTHESIA   (positive) Obstructive sleep apnea syndrome      PULMONARY   (positive) COPD (chronic obstructive pulmonary disease) (HCC)      NEURO   (positive) Cerebrovascular accident (CVA) due to embolism of cerebral artery (HCC)   (positive) History of CVA (cerebrovascular accident)      CARDIAC   (positive) Atherosclerosis   (positive) Bilateral carotid artery stenosis   (positive) Coronary artery disease involving native coronary artery without angina pectoris   (positive) Essential hypertension      GI   (positive) Gastroesophageal reflux disease without esophagitis         (positive) Hepatomegaly   (positive) Nonalcoholic fatty liver disease       Physical Exam    Airway   Mallampati: II  TM distance: >3 FB  Neck ROM: full       Cardiovascular - normal exam  Rhythm: regular  Rate: normal  (-) murmur     Dental   Comments: Multiple missing teeth, nothing reported to be loose    Very poor dentition   Pulmonary - normal exam  Breath sounds clear to auscultation     Abdominal    Neurological - normal exam                 Anesthesia Plan    ASA 3   ASA physical status 3 criteria: COPD and CVA or TIA - history (> 3 months)    Plan - general       Airway plan will be natural airway        Plan Factors:   Patient did not smoke on day of procedure.      Induction: intravenous      Pertinent diagnostic labs and testing reviewed    Informed Consent:    Anesthetic plan and risks discussed with patient.

## 2022-03-01 NOTE — H&P
Physician H&P    Patient ID:  Luciano Silverman  [id number]  51 y.o. male  1970    History:  Primary Diagnosis: diarrhea    HPI:  Elevated calprotectin. CT showed pancolitis. Denied further modifying factors, associated symptoms or timing issues.  Of note, I spoke to patient's nurse about patient to obtain further history.        Past Medical History:  has a past medical history of Adverse effect of anesthesia, Anxiety, Arthritis, Blood clotting disorder ([facility]), Bowel habit changes, Breath shortness, Cancer ([facility]), Carcinoma in situ of respiratory system, COPD (chronic obstructive pulmonary disease) ([facility]), Dental disorder, Depression, Emphysema of lung ([facility]), Heart burn, High cholesterol, Hyperlipidemia, Hypertension, Indigestion, Panic disorder with agoraphobia and mild panic attacks (4/29/2019), PTSD (post-traumatic stress disorder), Sleep apnea, Snoring, and Stroke ([facility]).  Past Surgical History:  has a past surgical history that includes abdominal exploration and appendectomy.  Past Social History:  reports that he quit smoking about 2 years ago. He has a 19.00 pack-year smoking history. He has quit using smokeless tobacco.  His smokeless tobacco use included chew. He reports current alcohol use. He reports current drug use. Frequency: 7.00 times per week. Drug: Marijuana.  Past Family History:   Family History   Problem Relation Age of Onset   • Hypertension Mother    • Hyperlipidemia Mother    • Hypertension Father    • Lung Disease Father    • Cancer Child         non hodkins lymphoma   • Anxiety disorder Brother      Allergies: Patient has no known allergies.    Current Medications:  Prior to Admission medications    Medication Sig Start Date End Date Taking? Authorizing Provider   prazosin (MINIPRESS) 2 MG Cap TAKE 2 CAPS BY MOUTH EVERY BEDTIME 2/8/22 5/9/22  Luis Miguel Merino M.D.   buPROPion (WELLBUTRIN XL) 300 MG XL tablet TAKE 1 TABLET BY MOUTH EVERY DAY IN THE MORNING 7/21/21   Luis Miguel Merino M.D.    multivitamin (THERAGRAN) Tab Take 1 tablet by mouth every day.    Physician Outpatient   asa/apap/caffeine (EXCEDRIN) 250-250-65 MG Tab Take 3 Tablets by mouth one time as needed for Headache.    Physician Outpatient   tamsulosin (FLOMAX) 0.4 MG capsule Take 0.4 mg by mouth 2 (two) times a day.  Patient not taking: No sig reported 12/3/20   Physician Outpatient   lisinopril (PRINIVIL) 40 MG tablet TAKE 1 TABLET BY MOUTH EVERY DAY 10/15/20   Xin Graham M.D.   fluticasone (FLONASE) 50 MCG/ACT nasal spray 1 SPRAY INTO EACH NOSTRIL EVERY DAY. 7/20/20   Xin Graham M.D.   albuterol 108 (90 Base) MCG/ACT Aero Soln inhalation aerosol Inhale 2 Puffs by mouth every four hours as needed for Shortness of Breath. 3/9/20   Xin Graham M.D.   urea (CARMOL) 10 % cream Use after showers and daily all over to dry skin patches. 6/10/19   Xin Graham M.D.   loratadine (CLARITIN) 10 MG Tab Take 10 mg by mouth every day.    Physician Outpatient   atorvastatin (LIPITOR) 80 MG tablet Take 80 mg by mouth every day. 1/30/19   Physician Outpatient   fenofibrate (TRICOR) 145 MG Tab TAKE 1 TABLET BY ORAL ROUTE EVERY DAY BEFORE BED  Patient not taking: No sig reported 2/22/19   Physician Outpatient   omeprazole (PRILOSEC) 20 MG delayed-release capsule Take 20 mg by mouth every day.    Physician Outpatient       Review of Systems:  Review of Systems   Constitutional: Negative for chills and fever.   HENT: Negative for sore throat.    Eyes: Negative for discharge and redness.   Respiratory: Negative for cough, sputum production and stridor.    Cardiovascular: Negative for chest pain and palpitations.   Gastrointestinal: Positive for diarrhea and heartburn.   Genitourinary: Negative for flank pain and hematuria.   Musculoskeletal: Positive for myalgias. Negative for falls.   Skin: Negative for itching and rash.   Neurological: Negative for seizures and loss of consciousness.   Endo/Heme/Allergies: Negative for  "polydipsia. Does not bruise/bleed easily.   Psychiatric/Behavioral: Negative for suicidal ideas. The patient is not nervous/anxious.      /72   Pulse 82   Temp 36.1 °C (97 °F) (Temporal)   Resp 18   Ht 1.753 m (5' 9\")   Wt 85.5 kg (188 lb 7.9 oz)   SpO2 93%     Physical Examination:  Physical Exam  Vitals and nursing note reviewed.   Constitutional:       General: He is not in acute distress.     Appearance: Normal appearance. He is normal weight. He is not toxic-appearing.   HENT:      Head: Normocephalic and atraumatic.      Nose: Nose normal. No rhinorrhea.      Mouth/Throat:      Mouth: Mucous membranes are moist.      Pharynx: Oropharynx is clear.   Eyes:      General: No scleral icterus.     Conjunctiva/sclera: Conjunctivae normal.      Pupils: Pupils are equal, round, and reactive to light.   Cardiovascular:      Rate and Rhythm: Normal rate and regular rhythm.   Pulmonary:      Effort: Pulmonary effort is normal. No respiratory distress.      Breath sounds: No stridor.   Abdominal:      General: Abdomen is flat. There is no distension.      Tenderness: There is no abdominal tenderness. There is no rebound.   Musculoskeletal:         General: Normal range of motion.      Cervical back: Normal range of motion. No rigidity.      Right lower leg: No edema.      Left lower leg: No edema.   Skin:     General: Skin is warm.      Coloration: Skin is not jaundiced.   Neurological:      General: No focal deficit present.      Mental Status: He is alert. Mental status is at baseline.   Psychiatric:         Mood and Affect: Mood normal.         Behavior: Behavior normal.         Impression:  Diarrhea  Elevated calprotectin  Rectal discomfort  Tenesmus  Lower abdominal pain  BMI of 27    Plan:  Patient seen and examined before proceeding with colonoscopy with biopsies under anesthesia with possible snare polypectomy(ies) and/or other endotherapy. Risks, benefits, and alternatives of aforementioned procedures " were discussed with patient in detail before proceeding.  Patient was given opportunities to ask questions and discuss other options.  Risks including but not limited to perforation, infection, bleeding, missed lesion(s), possible need for surgery(ies) and/or interventional radiology, possible need for repeat procedure(s) and/or additional testing, hospitalization possibly prolonged, cardiac and/or pulmonary event, aspiration, hypoxia, stroke, medication (s) and/or anesthesia reaction(s), indefinite diagnosis, discomfort/pain, unsuccessful and/or incomplete procedure, ineffective therapy, persistent symptoms, damage to adjacent organs/structure and/or vascular such as splenic laceration, and other adverse event(s) possibly life-threatening.  Interactive discussion was undertaken with Layman's terms.  I answered questions in full and to satisfaction.  I gave opportunity to cancel, delay and/or reschedule if not completely comfortable with proceeding.  Patient stated understanding and acceptance of these risks, and wished to proceed.   Informed consent was given in clear state of mind and paper permit was confirmed to have been signed before proceeding.        Skyler Corbett M.D.  3/1/2022

## 2022-03-17 ENCOUNTER — HOSPITAL ENCOUNTER (OUTPATIENT)
Dept: RADIATION ONCOLOGY | Facility: MEDICAL CENTER | Age: 52
End: 2022-03-31
Attending: RADIOLOGY
Payer: COMMERCIAL

## 2022-03-17 VITALS
OXYGEN SATURATION: 95 % | HEIGHT: 69 IN | SYSTOLIC BLOOD PRESSURE: 104 MMHG | WEIGHT: 192 LBS | DIASTOLIC BLOOD PRESSURE: 67 MMHG | BODY MASS INDEX: 28.44 KG/M2 | TEMPERATURE: 97.4 F | RESPIRATION RATE: 18 BRPM | HEART RATE: 81 BPM

## 2022-03-17 DIAGNOSIS — C34.12 MALIGNANT NEOPLASM OF UPPER LOBE OF LEFT LUNG (HCC): ICD-10-CM

## 2022-03-17 PROBLEM — F51.5 NIGHTMARE DISORDER: Status: ACTIVE | Noted: 2020-01-07

## 2022-03-17 PROCEDURE — 99214 OFFICE O/P EST MOD 30 MIN: CPT | Performed by: RADIOLOGY

## 2022-03-17 PROCEDURE — 99205 OFFICE O/P NEW HI 60 MIN: CPT | Performed by: RADIOLOGY

## 2022-03-17 RX ORDER — TIZANIDINE 4 MG/1
4 TABLET ORAL EVERY 6 HOURS PRN
Status: ON HOLD | COMMUNITY
End: 2022-04-06

## 2022-03-17 RX ORDER — ALPRAZOLAM 0.5 MG/1
0.5 TABLET ORAL NIGHTLY PRN
COMMUNITY
End: 2022-04-21

## 2022-03-17 RX ORDER — FLUVOXAMINE MALEATE 100 MG
100 TABLET ORAL NIGHTLY
COMMUNITY
End: 2022-04-21 | Stop reason: SDUPTHER

## 2022-03-17 RX ORDER — BUSPIRONE HYDROCHLORIDE 10 MG/1
30 TABLET ORAL 2 TIMES DAILY
Status: ON HOLD | COMMUNITY
End: 2022-04-06

## 2022-03-17 RX ORDER — EVOLOCUMAB 140 MG/ML
140 INJECTION, SOLUTION SUBCUTANEOUS
Status: ON HOLD | COMMUNITY
Start: 2021-11-12 | End: 2022-04-06

## 2022-03-17 RX ORDER — BUSPIRONE HYDROCHLORIDE 30 MG/1
30 TABLET ORAL 2 TIMES DAILY
COMMUNITY
Start: 2022-02-21 | End: 2022-04-21 | Stop reason: SDUPTHER

## 2022-03-17 ASSESSMENT — PAIN SCALES - GENERAL: PAINLEVEL: NO PAIN

## 2022-03-17 ASSESSMENT — FIBROSIS 4 INDEX: FIB4 SCORE: 0.88

## 2022-03-17 NOTE — CONSULTS
RADIATION ONCOLOGY CONSULT    Patient name:  Luciano Silverman    Primary Physician:  KOSTA Aguilar MRN: [id number]  [facility]: [id number]   Referring physician:  Bossman Viera M.D.  : 1970, 51 y.o.     DATE OF SERVICE: 3/17/2022    IDENTIFICATION: A 51 y.o. male with   Malignant neoplasm of upper lobe of left lung (HCC)  Staging form: Lung, AJCC 8th Edition  - Clinical stage from 3/17/2022: Stage IIIA (cT2a, cN2, cM0) - Signed by Pascual Salazar M.D. on 3/17/2022  Stage prefix: Initial diagnosis  Histologic grade (G): GX  Histologic grading system: 4 grade system    He is here at the kind request of Dr. Viera      HISTORY OF PRESENT ILLNESS:  Patient is a pleasant 51-year-old male who originally presented with sleep apnea work-up and had a screening CT chest done which showed an anterior mediastinal mass and this was followed up with a PET/CT on 2022 which showed uptake within 3 cm mediastinal mass with no uptake elsewhere within lymph nodes except mild activity in the rectum.  Patient did undergo colonoscopy with Dr. Corbett which did not show any inflammatory bowel disease or other masses.  Patient was presented at [place] on tumor board and consensus was surgical resection of PFTs are acceptable.  Patient had PFTs which showed FEV1 of 2.11 and FVC of 3.9.  FEV1 over FVC 68% predicted.  Normal DLCO.  Patient does have a history of prostate cancer treated with brachytherapy in .  Patient doing well with no significant symptoms. Does have 10-15lb wt loss last 6 months.    PROBLEM LIST:  Patient Active Problem List   Diagnosis   • Anxiety   • Chronic bilateral low back pain with bilateral sciatica   • Essential hypertension   • Dyslipidemia   • COPD (chronic obstructive pulmonary disease) (HCC)   • Tobacco dependence   • Prediabetes   • Mild episode of recurrent major depressive disorder (HCC)   • Cerebrovascular accident (CVA) due to embolism of cerebral artery (HCC)   • BMI  34.0-34.9,adult   • Seasonal allergies   • Obstructive sleep apnea of adult   • Gastroesophageal reflux disease without esophagitis   • Elevated liver enzymes   • Panic disorder with agoraphobia and mild panic attacks   • Nonalcoholic fatty liver disease   • Cannabis dependence (HCC)   • Diverticulosis   • Nightmare disorder   • Marijuana user   • Prostate cancer (HCC)   • Bilateral carpal tunnel syndrome   • History of stroke   • Atherosclerosis   • Hepatomegaly   • Coronary artery disease involving native coronary artery without angina pectoris   • Bilateral carotid artery stenosis   • Closed fracture of shaft of metatarsal bone with routine healing   • Malignant neoplasm of upper lobe of left lung (HCC)        PAST SURGICAL HISTORY:  Past Surgical History:   Procedure Laterality Date   • UT COLONOSCOPY,DIAGNOSTIC N/A 3/1/2022    Procedure: COLONOSCOPY;  Surgeon: Skyler Corbett M.D.;  Location: SURGERY [facility];  Service: Gastroenterology   • ABDOMINAL EXPLORATION     • APPENDECTOMY         CURRENT MEDICATIONS:  Current Outpatient Medications   Medication Sig Dispense Refill   • tizanidine (ZANAFLEX) 4 MG Tab Take 4 mg by mouth every 6 hours as needed.     • busPIRone (BUSPAR) 10 MG Tab tablet Take 30 mg by mouth 2 times a day.     • ALPRAZolam (XANAX) 0.5 MG Tab Take 0.5 mg by mouth at bedtime as needed for Sleep.     • fluvoxAMINE (LUVOX) 50 MG Tab Take 100 mg by mouth every evening.     • Evolocumab, REPATHA, (REPATHA SURECLICK) 140 MG/ML Solution Auto-injector Inject 140 mg under the skin.     • prazosin (MINIPRESS) 2 MG Cap TAKE 2 CAPS BY MOUTH EVERY BEDTIME 180 Capsule 0   • buPROPion (WELLBUTRIN XL) 300 MG XL tablet TAKE 1 TABLET BY MOUTH EVERY DAY IN THE MORNING 90 tablet 0   • multivitamin (THERAGRAN) Tab Take 1 tablet by mouth every day.     • asa/apap/caffeine (EXCEDRIN) 250-250-65 MG Tab Take 3 Tablets by mouth one time as needed for Headache.     • tamsulosin (FLOMAX) 0.4 MG capsule Take 0.4 mg  by mouth 2 (two) times a day.     • lisinopril (PRINIVIL) 40 MG tablet TAKE 1 TABLET BY MOUTH EVERY DAY 90 Tab 3   • fluticasone (FLONASE) 50 MCG/ACT nasal spray 1 SPRAY INTO EACH NOSTRIL EVERY DAY. 48 mL 2   • albuterol 108 (90 Base) MCG/ACT Aero Soln inhalation aerosol Inhale 2 Puffs by mouth every four hours as needed for Shortness of Breath. 1 Inhaler 0   • urea (CARMOL) 10 % cream Use after showers and daily all over to dry skin patches. 2 Tube 3   • loratadine (CLARITIN) 10 MG Tab Take 10 mg by mouth every day.     • atorvastatin (LIPITOR) 80 MG tablet Take 80 mg by mouth every day.  11   • fenofibrate (TRICOR) 145 MG Tab TAKE 1 TABLET BY ORAL ROUTE EVERY DAY BEFORE BED  5   • omeprazole (PRILOSEC) 20 MG delayed-release capsule Take 20 mg by mouth every day.     • busPIRone (BUSPAR) 30 MG tablet TAKE 1 TABLET BY MOUTH TWICE A DAY EVERY DAY       No current facility-administered medications for this encounter.       ALLERGIES:    Patient has no known allergies.    FAMILY HISTORY:    family history includes Anxiety disorder in his brother; Cancer in his child, daughter, and father; Hyperlipidemia in his mother; Hypertension in his father and mother; Lung Disease in his father.   Pt currently resides in [place] with his wife.  He is disabled.    SOCIAL HISTORY:     reports that he quit smoking about 12 months ago. He has a 19.00 pack-year smoking history. He has quit using smokeless tobacco.  His smokeless tobacco use included chew. He reports current alcohol use. He reports current drug use. Frequency: 7.00 times per week. Drug: Marijuana.    REVIEW OF SYSTEMS:    A complete review of systems taken. Pertinent items in HPI. All others negative.    PHYSICAL EXAM:    PERFORMANCE STATUS:  ECOG Performance Review 3/17/2022   ECOG Performance Status Ambulatory and capable of all selfcare but unable to carry out any work activities.  Up and about more than 50% of waking hours   Some recent data might be hidden  "    Karnofsky Score 3/17/2022   Karnofsky Score 70   Some recent data might be hidden     /67   Pulse 81   Temp 36.3 °C (97.4 °F)   Resp 18   Ht 1.753 m (5' 9\")   Wt 87.1 kg (192 lb)   SpO2 95%   BMI 28.35 kg/m²   Physical Exam  Vitals reviewed.   HENT:      Head: Normocephalic.      Mouth/Throat:      Mouth: Mucous membranes are moist.   Eyes:      Pupils: Pupils are equal, round, and reactive to light.   Cardiovascular:      Rate and Rhythm: Normal rate.   Pulmonary:      Effort: Pulmonary effort is normal.   Abdominal:      General: Abdomen is flat.   Musculoskeletal:         General: Normal range of motion.      Cervical back: Normal range of motion.   Neurological:      General: No focal deficit present.      Mental Status: He is alert.   Psychiatric:         Mood and Affect: Mood normal.          LABORATORY DATA:   Lab Results   Component Value Date/Time    WBC 7.7 02/24/2022 03:13 PM    RBC 5.17 02/24/2022 03:13 PM    HEMOGLOBIN 14.2 02/24/2022 03:13 PM    HEMATOCRIT 44.5 02/24/2022 03:13 PM    MCV 86.1 02/24/2022 03:13 PM    MCH 27.5 02/24/2022 03:13 PM    MCHC 31.9 (L) 02/24/2022 03:13 PM    RDW 44.2 02/24/2022 03:13 PM    PLATELETCT 239 02/24/2022 03:13 PM    MPV 10.8 02/24/2022 03:13 PM    NEUTSPOLYS 60.40 12/13/2021 02:47 PM    LYMPHOCYTES 21.80 (L) 12/13/2021 02:47 PM    MONOCYTES 11.40 12/13/2021 02:47 PM    EOSINOPHILS 5.80 12/13/2021 02:47 PM    BASOPHILS 0.40 12/13/2021 02:47 PM      Lab Results   Component Value Date/Time    SODIUM 138 02/24/2022 03:13 PM    POTASSIUM 4.2 02/24/2022 03:13 PM    CHLORIDE 100 02/24/2022 03:13 PM    CO2 26 02/24/2022 03:13 PM    GLUCOSE 88 02/24/2022 03:13 PM    BUN 19 02/24/2022 03:13 PM    CREATININE 1.04 02/24/2022 03:13 PM           RADIOLOGY DATA:  No results found.    IMPRESSION:    A 51 y.o. with  Malignant neoplasm of upper lobe of left lung (HCC)  Staging form: Lung, AJCC 8th Edition  - Clinical stage from 3/17/2022: Stage IIIA (cT2a, cN2, cM0) - " Signed by Pascual Salazar M.D. on 3/17/2022  Stage prefix: Initial diagnosis  Histologic grade (G): GX  Histologic grading system: 4 grade system    RECOMMENDATIONS:   I reviewed with patient the little unclear of a staging either he has a small primary left apical mass with mediastinal lymphadenopathy which would be stage III or an apical mass growing into the mediastinum.  Either way I did discuss that given single level N2 disease surgical resection would be reasonable upfront or definitive intent chemoradiation therapy especially in the context if this is unresectable.  Other possibilities would be neoadjuvant nivolumab and platinum per recent phase 3 checkpoint 816 trial.  I did review his PFTs which seen like he has some moderate obstructive symptoms but unlikely would preclude him from a surgical resection.  Patient will see Dr. Humphrey next week to finalize plan.  I explained that we would call next Thursday once I have talked to Dr. Humphrey and Dr. Viera.    Thank you for the opportunity to participate in his care.  If any questions or comments, please do not hesitate in calling.    Orders Placed This Encounter   • Referral to Oncology Psychosocial Screening for Distress   • tizanidine (ZANAFLEX) 4 MG Tab   • busPIRone (BUSPAR) 10 MG Tab tablet   • ALPRAZolam (XANAX) 0.5 MG Tab   • fluvoxAMINE (LUVOX) 50 MG Tab   • Evolocumab, REPATHA, (REPATHA SURECLICK) 140 MG/ML Solution Auto-injector   • busPIRone (BUSPAR) 30 MG tablet     CC: Dr. Humphrey, Dr. Viera

## 2022-03-17 NOTE — NON-PROVIDER
"Patient was seen today in clinic with Dr. Salazar for consult.  Vitals signs and weight were obtained and pain assessment was completed.  Allergies and medications were reviewed with the patient.    Vitals/Pain:  Vitals:    03/17/22 1402   BP: 104/67   Pulse: 81   Resp: 18   Temp: 36.3 °C (97.4 °F)   SpO2: 95%   Weight: 87.1 kg (192 lb)   Height: 1.753 m (5' 9\")   Pain Score: No pain (has chronic back pain)        Allergies:   Patient has no known allergies.    Current Medications:  Current Outpatient Medications   Medication Sig Dispense Refill   • tizanidine (ZANAFLEX) 4 MG Tab Take 4 mg by mouth every 6 hours as needed.     • busPIRone (BUSPAR) 10 MG Tab tablet Take 30 mg by mouth 2 times a day.     • ALPRAZolam (XANAX) 0.5 MG Tab Take 0.5 mg by mouth at bedtime as needed for Sleep.     • fluvoxAMINE (LUVOX) 50 MG Tab Take 100 mg by mouth every evening.     • prazosin (MINIPRESS) 2 MG Cap TAKE 2 CAPS BY MOUTH EVERY BEDTIME 180 Capsule 0   • buPROPion (WELLBUTRIN XL) 300 MG XL tablet TAKE 1 TABLET BY MOUTH EVERY DAY IN THE MORNING 90 tablet 0   • multivitamin (THERAGRAN) Tab Take 1 tablet by mouth every day.     • asa/apap/caffeine (EXCEDRIN) 250-250-65 MG Tab Take 3 Tablets by mouth one time as needed for Headache.     • tamsulosin (FLOMAX) 0.4 MG capsule Take 0.4 mg by mouth 2 (two) times a day.     • lisinopril (PRINIVIL) 40 MG tablet TAKE 1 TABLET BY MOUTH EVERY DAY 90 Tab 3   • fluticasone (FLONASE) 50 MCG/ACT nasal spray 1 SPRAY INTO EACH NOSTRIL EVERY DAY. 48 mL 2   • albuterol 108 (90 Base) MCG/ACT Aero Soln inhalation aerosol Inhale 2 Puffs by mouth every four hours as needed for Shortness of Breath. 1 Inhaler 0   • urea (CARMOL) 10 % cream Use after showers and daily all over to dry skin patches. 2 Tube 3   • loratadine (CLARITIN) 10 MG Tab Take 10 mg by mouth every day.     • atorvastatin (LIPITOR) 80 MG tablet Take 80 mg by mouth every day.  11   • fenofibrate (TRICOR) 145 MG Tab TAKE 1 TABLET BY ORAL " ROUTE EVERY DAY BEFORE BED  5   • omeprazole (PRILOSEC) 20 MG delayed-release capsule Take 20 mg by mouth every day.       No current facility-administered medications for this encounter.         PCP:  Fanny Ann R.N.

## 2022-03-18 ENCOUNTER — PATIENT OUTREACH (OUTPATIENT)
Dept: OTHER | Facility: MEDICAL CENTER | Age: 52
End: 2022-03-18
Payer: COMMERCIAL

## 2022-03-18 NOTE — PROGRESS NOTES
On 3-18-22, Oncology Social Worker, Viviana Garibay, contacted pt via phone. KENZIE Garibay introduced self as part of pt's social support team at [facility]. KENZIE Garibay inquired on pt's and/or families current needs and addressed distress score of 7, relating to tx decisions and worry.   Pt reports, having a large medical team working on finding the best route of care for pt. Pt reports an appt with Dr. Humphrey to discuss surgery. KENZIE Garibay inquired on pt's finances. Pt reports he has not worked since 2018, at which time he filed for disability and has been fighting to get disability every since. Pt reports [employer] are his legal advisors and reports they failed to file that pt has COPD and other medical conditions and his case had to be re-opened in August 2020. KENZIE Garibay thanked pt for the update and provided resources to T[facility] and [facility] for support. Pt agreeable.  KENZIE Garibay inquired on pt's BH and offered resources to BH and group/peer support. Pt declined and reports not being open enough to discuss because he is the one who takes care of everyone else. KENZIE Garibay encouraged pt to care for self equally. Pt declined at this time.  KENZIE Garibay inquired on other potential barriers to care. Pt denied.   KENZIE Garibay thanked pt for his time, provided direct contact information, and encouraged pt to contact.

## 2022-03-18 NOTE — PROGRESS NOTES
Cancer navigation introduction letter sent.  Pt will be seeing Dr Humphrey next week and physicians will finalize plan after collaborating.  Will follow up with patient once tx plan established.  Available as cancer support resource.

## 2022-03-18 NOTE — LETTER
[facility]   [address]  [place] [address]  Phone: [phone] - Fax: [phone]                 Date: 03/18/22    Dear Luciano,    I am a Cancer Nurse Navigator, a certified oncology nurse. My role is to assess any needs you may have with education, guidance and support. I am available to you and your family through your treatment at [facility].       I am available to address your needs during your journey with the following services:     Care Coordination  I can assist you in facilitating communication between your cancer care treatment team to ensure timely treatment and follow-up.  I can also assist with transition of care back to your primary care provider, or other specialist, as needed.  My goal is to bridge gaps for you throughout the course of your active treatment.       Education Services  Understanding the recommended treatment course by your physician is key. I can provide educational resources personalized to your cancer diagnosis to help you understand your diagnosis and treatment. Please let me know if you would like to receive information about your diagnosis and treatment plan.  I am here to help.     Support Services/Resource Information  [facility] we offer a full scope of support services.  I can assist you with referral information to:  · Cancer Clinical Trials & Research  · Nutrition counseling  · Support groups  · Complementary Therapies such as Mind-Body Techniques Meditation  · Patient Financial Advocates  ·   · [facility], an [employer] affiliate office, our volunteers can assist you with accessing our [employer] library, support services information, head coverings and comfort items  · Community and national resources, included eligibility based tahir assistance and pharmaceutical access programs, if you are in need of additional information.     [facility] offers services that  include:  · Behavioral Health  · Genetic counseling & testing  · Acupuncture  · Lymphedema prevention/treatment program     I hope you have an excellent patient experience.  Please feel free to share with me your comments regarding the care you have received- we value your feedback.    Sincerely,     Viviana Alcala R.N.  Cancer Nurse Navigator    Office: [phone]  Main:  [phone]  Email:  [email or web address]

## 2022-03-24 ENCOUNTER — TELEPHONE (OUTPATIENT)
Dept: RADIATION ONCOLOGY | Facility: MEDICAL CENTER | Age: 52
End: 2022-03-24
Payer: COMMERCIAL

## 2022-03-24 PROCEDURE — 99441 PR PHYSICIAN TELEPHONE EVALUATION 5-10 MIN: CPT | Mod: 95 | Performed by: RADIOLOGY

## 2022-03-24 NOTE — TELEPHONE ENCOUNTER
Telephone Appointment Visit   This telephone visit was initiated by the patient and they verbally consented.    Reason for Call:  Lab Follow-up    HPI:    Patient is a pleasant 51-year-old male who originally presented with sleep apnea work-up and had a screening CT chest done which showed an anterior mediastinal mass and this was followed up with a PET/CT on January 19, 2022 which showed uptake within 3 cm mediastinal mass with no uptake elsewhere within lymph nodes except mild activity in the rectum.  Patient did undergo colonoscopy with Dr. Corbett which did not show any inflammatory bowel disease or other masses.  Patient was presented at [place] on tumor board and consensus was surgical resection of PFTs are acceptable.  Patient had PFTs which showed FEV1 of 2.11 and FVC of 3.9.  FEV1 over FVC 68% predicted.  Normal DLCO.  Patient does have a history of prostate cancer treated with brachytherapy in 2020.  Patient doing well with no significant symptoms. Does have 10-15lb wt loss last 6 months.    Interval hx  Seeing dr. quintanilla today for discussion on surgery    Labs / Images Reviewed:   CT chest 1/17/22  3cm anterior mediastinal mass    Assessment and Plan:       Follow-up: post surgery pathology  I discussed case with Dr. Quintanilla and his PFTs look amenable to resection. He will discuss this today. Plan with be upfront surgery followed by possible postop radiation only in the context of +margins, otherwise systemic therapy will be decided by Dr. Viera    Total Time Spent (mins): 5    Pascaul Salazar M.D.

## 2022-03-29 ENCOUNTER — PATIENT OUTREACH (OUTPATIENT)
Dept: OTHER | Facility: MEDICAL CENTER | Age: 52
End: 2022-03-29
Payer: COMMERCIAL

## 2022-03-29 NOTE — PROGRESS NOTES
Follow up call placed to patient.  Pt reports did see surgeon, medical and radiation oncologists.  He has pre-admit appointment this week and surgery scheduled for next Wed.  Pt reports uses [employer] frequently.  Reviewed role of navigator and provided contact information.  Pt reporting has a lot of family who are a support for him.  He stated that normally he is their support, but feels confident that they can provide support for him and has kept them updated.  Pt denies any current questions or needs.  Encouraged to call if questions, concerns or barriers to care come up.  Continue to follow.

## 2022-04-01 ENCOUNTER — PRE-ADMISSION TESTING (OUTPATIENT)
Dept: ADMISSIONS | Facility: MEDICAL CENTER | Age: 52
DRG: 168 | End: 2022-04-01
Attending: SURGERY
Payer: COMMERCIAL

## 2022-04-01 DIAGNOSIS — Z01.810 PRE-OPERATIVE CARDIOVASCULAR EXAMINATION: ICD-10-CM

## 2022-04-01 DIAGNOSIS — Z01.812 PRE-OPERATIVE LABORATORY EXAMINATION: ICD-10-CM

## 2022-04-01 LAB
ABO GROUP BLD: NORMAL
ALBUMIN SERPL BCP-MCNC: 4.8 G/DL (ref 3.2–4.9)
ALBUMIN/GLOB SERPL: 1.6 G/DL
ALP SERPL-CCNC: 53 U/L (ref 30–99)
ALT SERPL-CCNC: 27 U/L (ref 2–50)
ANION GAP SERPL CALC-SCNC: 13 MMOL/L (ref 7–16)
AST SERPL-CCNC: 27 U/L (ref 12–45)
BASOPHILS # BLD AUTO: 0.3 % (ref 0–1.8)
BASOPHILS # BLD: 0.02 K/UL (ref 0–0.12)
BILIRUB SERPL-MCNC: 0.4 MG/DL (ref 0.1–1.5)
BLD GP AB SCN SERPL QL: NORMAL
BUN SERPL-MCNC: 14 MG/DL (ref 8–22)
CALCIUM SERPL-MCNC: 10.5 MG/DL (ref 8.5–10.5)
CHLORIDE SERPL-SCNC: 101 MMOL/L (ref 96–112)
CO2 SERPL-SCNC: 24 MMOL/L (ref 20–33)
CREAT SERPL-MCNC: 0.98 MG/DL (ref 0.5–1.4)
EOSINOPHIL # BLD AUTO: 0.08 K/UL (ref 0–0.51)
EOSINOPHIL NFR BLD: 1.2 % (ref 0–6.9)
ERYTHROCYTE [DISTWIDTH] IN BLOOD BY AUTOMATED COUNT: 45.4 FL (ref 35.9–50)
GFR SERPLBLD CREATININE-BSD FMLA CKD-EPI: 92 ML/MIN/1.73 M 2
GLOBULIN SER CALC-MCNC: 3 G/DL (ref 1.9–3.5)
GLUCOSE SERPL-MCNC: 101 MG/DL (ref 65–99)
HCT VFR BLD AUTO: 43.4 % (ref 42–52)
HGB BLD-MCNC: 14.1 G/DL (ref 14–18)
IMM GRANULOCYTES # BLD AUTO: 0.02 K/UL (ref 0–0.11)
IMM GRANULOCYTES NFR BLD AUTO: 0.3 % (ref 0–0.9)
INR PPP: 1.09 (ref 0.87–1.13)
LYMPHOCYTES # BLD AUTO: 1.26 K/UL (ref 1–4.8)
LYMPHOCYTES NFR BLD: 19.3 % (ref 22–41)
MCH RBC QN AUTO: 27.6 PG (ref 27–33)
MCHC RBC AUTO-ENTMCNC: 32.5 G/DL (ref 33.7–35.3)
MCV RBC AUTO: 84.9 FL (ref 81.4–97.8)
MONOCYTES # BLD AUTO: 0.59 K/UL (ref 0–0.85)
MONOCYTES NFR BLD AUTO: 9 % (ref 0–13.4)
NEUTROPHILS # BLD AUTO: 4.55 K/UL (ref 1.82–7.42)
NEUTROPHILS NFR BLD: 69.9 % (ref 44–72)
NRBC # BLD AUTO: 0 K/UL
NRBC BLD-RTO: 0 /100 WBC
PLATELET # BLD AUTO: 299 K/UL (ref 164–446)
PMV BLD AUTO: 10.4 FL (ref 9–12.9)
POTASSIUM SERPL-SCNC: 4.2 MMOL/L (ref 3.6–5.5)
PROT SERPL-MCNC: 7.8 G/DL (ref 6–8.2)
PROTHROMBIN TIME: 13.8 SEC (ref 12–14.6)
RBC # BLD AUTO: 5.11 M/UL (ref 4.7–6.1)
RH BLD: NORMAL
SARS-COV-2 RNA RESP QL NAA+PROBE: NOTDETECTED
SODIUM SERPL-SCNC: 138 MMOL/L (ref 135–145)
SPECIMEN SOURCE: NORMAL
WBC # BLD AUTO: 6.5 K/UL (ref 4.8–10.8)

## 2022-04-01 PROCEDURE — U0003 INFECTIOUS AGENT DETECTION BY NUCLEIC ACID (DNA OR RNA); SEVERE ACUTE RESPIRATORY SYNDROME CORONAVIRUS 2 (SARS-COV-2) (CORONAVIRUS DISEASE [COVID-19]), AMPLIFIED PROBE TECHNIQUE, MAKING USE OF HIGH THROUGHPUT TECHNOLOGIES AS DESCRIBED BY CMS-2020-01-R: HCPCS

## 2022-04-01 PROCEDURE — 86850 RBC ANTIBODY SCREEN: CPT

## 2022-04-01 PROCEDURE — C9803 HOPD COVID-19 SPEC COLLECT: HCPCS

## 2022-04-01 PROCEDURE — U0005 INFEC AGEN DETEC AMPLI PROBE: HCPCS

## 2022-04-01 PROCEDURE — 86901 BLOOD TYPING SEROLOGIC RH(D): CPT

## 2022-04-01 PROCEDURE — 36415 COLL VENOUS BLD VENIPUNCTURE: CPT

## 2022-04-01 PROCEDURE — 80053 COMPREHEN METABOLIC PANEL: CPT

## 2022-04-01 PROCEDURE — 85610 PROTHROMBIN TIME: CPT

## 2022-04-01 PROCEDURE — 85025 COMPLETE CBC W/AUTO DIFF WBC: CPT

## 2022-04-01 PROCEDURE — 86900 BLOOD TYPING SEROLOGIC ABO: CPT

## 2022-04-01 ASSESSMENT — FIBROSIS 4 INDEX: FIB4 SCORE: 0.88

## 2022-04-06 ENCOUNTER — ANESTHESIA EVENT (OUTPATIENT)
Dept: SURGERY | Facility: MEDICAL CENTER | Age: 52
DRG: 168 | End: 2022-04-06
Payer: COMMERCIAL

## 2022-04-06 ENCOUNTER — ANESTHESIA (OUTPATIENT)
Dept: SURGERY | Facility: MEDICAL CENTER | Age: 52
DRG: 168 | End: 2022-04-06
Payer: COMMERCIAL

## 2022-04-06 ENCOUNTER — HOSPITAL ENCOUNTER (INPATIENT)
Facility: MEDICAL CENTER | Age: 52
LOS: 1 days | DRG: 168 | End: 2022-04-07
Attending: SURGERY | Admitting: SURGERY
Payer: COMMERCIAL

## 2022-04-06 ENCOUNTER — APPOINTMENT (OUTPATIENT)
Dept: RADIOLOGY | Facility: MEDICAL CENTER | Age: 52
DRG: 168 | End: 2022-04-06
Attending: SURGERY
Payer: COMMERCIAL

## 2022-04-06 DIAGNOSIS — G89.18 ACUTE POSTOPERATIVE PAIN: ICD-10-CM

## 2022-04-06 PROBLEM — C34.92 ADENOCARCINOMA, LUNG, LEFT (HCC): Status: ACTIVE | Noted: 2022-04-06

## 2022-04-06 LAB
ABO + RH BLD: NORMAL
ANION GAP SERPL CALC-SCNC: 12 MMOL/L (ref 7–16)
BASOPHILS # BLD AUTO: 0.1 % (ref 0–1.8)
BASOPHILS # BLD: 0.01 K/UL (ref 0–0.12)
BUN SERPL-MCNC: 19 MG/DL (ref 8–22)
CALCIUM SERPL-MCNC: 9 MG/DL (ref 8.5–10.5)
CHLORIDE SERPL-SCNC: 104 MMOL/L (ref 96–112)
CO2 SERPL-SCNC: 21 MMOL/L (ref 20–33)
CREAT SERPL-MCNC: 0.92 MG/DL (ref 0.5–1.4)
EOSINOPHIL # BLD AUTO: 0.09 K/UL (ref 0–0.51)
EOSINOPHIL NFR BLD: 0.8 % (ref 0–6.9)
ERYTHROCYTE [DISTWIDTH] IN BLOOD BY AUTOMATED COUNT: 45.2 FL (ref 35.9–50)
GFR SERPLBLD CREATININE-BSD FMLA CKD-EPI: 100 ML/MIN/1.73 M 2
GLUCOSE SERPL-MCNC: 148 MG/DL (ref 65–99)
HCT VFR BLD AUTO: 37.7 % (ref 42–52)
HGB BLD-MCNC: 12.3 G/DL (ref 14–18)
IMM GRANULOCYTES # BLD AUTO: 0.03 K/UL (ref 0–0.11)
IMM GRANULOCYTES NFR BLD AUTO: 0.3 % (ref 0–0.9)
LYMPHOCYTES # BLD AUTO: 0.75 K/UL (ref 1–4.8)
LYMPHOCYTES NFR BLD: 6.9 % (ref 22–41)
MCH RBC QN AUTO: 28 PG (ref 27–33)
MCHC RBC AUTO-ENTMCNC: 32.6 G/DL (ref 33.7–35.3)
MCV RBC AUTO: 85.7 FL (ref 81.4–97.8)
MONOCYTES # BLD AUTO: 0.5 K/UL (ref 0–0.85)
MONOCYTES NFR BLD AUTO: 4.6 % (ref 0–13.4)
NEUTROPHILS # BLD AUTO: 9.56 K/UL (ref 1.82–7.42)
NEUTROPHILS NFR BLD: 87.3 % (ref 44–72)
NRBC # BLD AUTO: 0 K/UL
NRBC BLD-RTO: 0 /100 WBC
PATHOLOGY CONSULT NOTE: NORMAL
PLATELET # BLD AUTO: 233 K/UL (ref 164–446)
PMV BLD AUTO: 10 FL (ref 9–12.9)
POTASSIUM SERPL-SCNC: 4 MMOL/L (ref 3.6–5.5)
RBC # BLD AUTO: 4.4 M/UL (ref 4.7–6.1)
SODIUM SERPL-SCNC: 137 MMOL/L (ref 135–145)
WBC # BLD AUTO: 10.9 K/UL (ref 4.8–10.8)

## 2022-04-06 PROCEDURE — 700102 HCHG RX REV CODE 250 W/ 637 OVERRIDE(OP): Performed by: ANESTHESIOLOGY

## 2022-04-06 PROCEDURE — 500512 HCHG ENDO PEANUT: Performed by: SURGERY

## 2022-04-06 PROCEDURE — 160041 HCHG SURGERY MINUTES - EA ADDL 1 MIN LEVEL 4: Performed by: SURGERY

## 2022-04-06 PROCEDURE — 700105 HCHG RX REV CODE 258: Performed by: SURGERY

## 2022-04-06 PROCEDURE — 700111 HCHG RX REV CODE 636 W/ 250 OVERRIDE (IP): Performed by: SURGERY

## 2022-04-06 PROCEDURE — A9270 NON-COVERED ITEM OR SERVICE: HCPCS | Performed by: ANESTHESIOLOGY

## 2022-04-06 PROCEDURE — 85025 COMPLETE CBC W/AUTO DIFF WBC: CPT

## 2022-04-06 PROCEDURE — 700101 HCHG RX REV CODE 250: Performed by: SURGERY

## 2022-04-06 PROCEDURE — A6402 STERILE GAUZE <= 16 SQ IN: HCPCS | Performed by: SURGERY

## 2022-04-06 PROCEDURE — 160048 HCHG OR STATISTICAL LEVEL 1-5: Performed by: SURGERY

## 2022-04-06 PROCEDURE — 3E0T3BZ INTRODUCTION OF ANESTHETIC AGENT INTO PERIPHERAL NERVES AND PLEXI, PERCUTANEOUS APPROACH: ICD-10-PCS | Performed by: SURGERY

## 2022-04-06 PROCEDURE — 71045 X-RAY EXAM CHEST 1 VIEW: CPT

## 2022-04-06 PROCEDURE — 160035 HCHG PACU - 1ST 60 MINS PHASE I: Performed by: SURGERY

## 2022-04-06 PROCEDURE — 160029 HCHG SURGERY MINUTES - 1ST 30 MINS LEVEL 4: Performed by: SURGERY

## 2022-04-06 PROCEDURE — 502703 HCHG DEVICE, LIGASURE V SEALER: Performed by: SURGERY

## 2022-04-06 PROCEDURE — 36415 COLL VENOUS BLD VENIPUNCTURE: CPT

## 2022-04-06 PROCEDURE — 88331 PATH CONSLTJ SURG 1 BLK 1SPC: CPT

## 2022-04-06 PROCEDURE — 500002 HCHG ADHESIVE, DERMABOND: Performed by: SURGERY

## 2022-04-06 PROCEDURE — 700111 HCHG RX REV CODE 636 W/ 250 OVERRIDE (IP): Performed by: ANESTHESIOLOGY

## 2022-04-06 PROCEDURE — 501581 HCHG TROCAR: Performed by: SURGERY

## 2022-04-06 PROCEDURE — 501570 HCHG TROCAR, SEPARATOR: Performed by: SURGERY

## 2022-04-06 PROCEDURE — C1725 CATH, TRANSLUMIN NON-LASER: HCPCS | Performed by: SURGERY

## 2022-04-06 PROCEDURE — 700101 HCHG RX REV CODE 250: Performed by: ANESTHESIOLOGY

## 2022-04-06 PROCEDURE — 770001 HCHG ROOM/CARE - MED/SURG/GYN PRIV*

## 2022-04-06 PROCEDURE — 160002 HCHG RECOVERY MINUTES (STAT): Performed by: SURGERY

## 2022-04-06 PROCEDURE — 160009 HCHG ANES TIME/MIN: Performed by: SURGERY

## 2022-04-06 PROCEDURE — 88341 IMHCHEM/IMCYTCHM EA ADD ANTB: CPT | Mod: 91

## 2022-04-06 PROCEDURE — 00522 ANES CLSD CH PX NDL BX PLEUR: CPT | Performed by: ANESTHESIOLOGY

## 2022-04-06 PROCEDURE — 0BBP4ZX EXCISION OF LEFT PLEURA, PERCUTANEOUS ENDOSCOPIC APPROACH, DIAGNOSTIC: ICD-10-PCS | Performed by: SURGERY

## 2022-04-06 PROCEDURE — 88342 IMHCHEM/IMCYTCHM 1ST ANTB: CPT

## 2022-04-06 PROCEDURE — 700102 HCHG RX REV CODE 250 W/ 637 OVERRIDE(OP): Performed by: SURGERY

## 2022-04-06 PROCEDURE — 80048 BASIC METABOLIC PNL TOTAL CA: CPT

## 2022-04-06 PROCEDURE — 64999 UNLISTED PX NERVOUS SYSTEM: CPT | Mod: 59 | Performed by: ANESTHESIOLOGY

## 2022-04-06 PROCEDURE — 501838 HCHG SUTURE GENERAL: Performed by: SURGERY

## 2022-04-06 PROCEDURE — 500378 HCHG DRAIN, J-VAC ROUND 19FR: Performed by: SURGERY

## 2022-04-06 PROCEDURE — 88305 TISSUE EXAM BY PATHOLOGIST: CPT

## 2022-04-06 PROCEDURE — A9270 NON-COVERED ITEM OR SERVICE: HCPCS | Performed by: SURGERY

## 2022-04-06 RX ORDER — DEXAMETHASONE SODIUM PHOSPHATE 4 MG/ML
4 INJECTION, SOLUTION INTRA-ARTICULAR; INTRALESIONAL; INTRAMUSCULAR; INTRAVENOUS; SOFT TISSUE
Status: DISCONTINUED | OUTPATIENT
Start: 2022-04-06 | End: 2022-04-07 | Stop reason: HOSPADM

## 2022-04-06 RX ORDER — BUSPIRONE HYDROCHLORIDE 10 MG/1
30 TABLET ORAL 2 TIMES DAILY
Status: DISCONTINUED | OUTPATIENT
Start: 2022-04-06 | End: 2022-04-07 | Stop reason: HOSPADM

## 2022-04-06 RX ORDER — OXYCODONE HCL 5 MG/5 ML
5 SOLUTION, ORAL ORAL
Status: COMPLETED | OUTPATIENT
Start: 2022-04-06 | End: 2022-04-06

## 2022-04-06 RX ORDER — CELECOXIB 200 MG/1
200 CAPSULE ORAL ONCE
Status: COMPLETED | OUTPATIENT
Start: 2022-04-06 | End: 2022-04-06

## 2022-04-06 RX ORDER — ROCURONIUM BROMIDE 10 MG/ML
INJECTION, SOLUTION INTRAVENOUS PRN
Status: DISCONTINUED | OUTPATIENT
Start: 2022-04-06 | End: 2022-04-06 | Stop reason: SURG

## 2022-04-06 RX ORDER — HALOPERIDOL 5 MG/ML
1 INJECTION INTRAMUSCULAR EVERY 6 HOURS PRN
Status: DISCONTINUED | OUTPATIENT
Start: 2022-04-06 | End: 2022-04-07 | Stop reason: HOSPADM

## 2022-04-06 RX ORDER — IBUPROFEN 800 MG/1
800 TABLET ORAL 3 TIMES DAILY
Status: DISCONTINUED | OUTPATIENT
Start: 2022-04-06 | End: 2022-04-07 | Stop reason: HOSPADM

## 2022-04-06 RX ORDER — FLUVOXAMINE MALEATE 50 MG/1
100 TABLET, COATED ORAL NIGHTLY
Status: DISCONTINUED | OUTPATIENT
Start: 2022-04-06 | End: 2022-04-07 | Stop reason: HOSPADM

## 2022-04-06 RX ORDER — ONDANSETRON 2 MG/ML
INJECTION INTRAMUSCULAR; INTRAVENOUS PRN
Status: DISCONTINUED | OUTPATIENT
Start: 2022-04-06 | End: 2022-04-06 | Stop reason: SURG

## 2022-04-06 RX ORDER — DIPHENHYDRAMINE HYDROCHLORIDE 50 MG/ML
25 INJECTION INTRAMUSCULAR; INTRAVENOUS EVERY 6 HOURS PRN
Status: DISCONTINUED | OUTPATIENT
Start: 2022-04-06 | End: 2022-04-07 | Stop reason: HOSPADM

## 2022-04-06 RX ORDER — ACETAMINOPHEN 500 MG
1000 TABLET ORAL EVERY 6 HOURS
Status: DISCONTINUED | OUTPATIENT
Start: 2022-04-06 | End: 2022-04-07 | Stop reason: HOSPADM

## 2022-04-06 RX ORDER — DEXTROSE MONOHYDRATE, SODIUM CHLORIDE, AND POTASSIUM CHLORIDE 50; 1.49; 4.5 G/1000ML; G/1000ML; G/1000ML
INJECTION, SOLUTION INTRAVENOUS CONTINUOUS
Status: DISCONTINUED | OUTPATIENT
Start: 2022-04-06 | End: 2022-04-07 | Stop reason: HOSPADM

## 2022-04-06 RX ORDER — OXYCODONE HYDROCHLORIDE 10 MG/1
10 TABLET ORAL
Status: DISCONTINUED | OUTPATIENT
Start: 2022-04-06 | End: 2022-04-07 | Stop reason: HOSPADM

## 2022-04-06 RX ORDER — SCOLOPAMINE TRANSDERMAL SYSTEM 1 MG/1
1 PATCH, EXTENDED RELEASE TRANSDERMAL
Status: DISCONTINUED | OUTPATIENT
Start: 2022-04-06 | End: 2022-04-07 | Stop reason: HOSPADM

## 2022-04-06 RX ORDER — DEXMEDETOMIDINE HYDROCHLORIDE 100 UG/ML
INJECTION, SOLUTION INTRAVENOUS PRN
Status: DISCONTINUED | OUTPATIENT
Start: 2022-04-06 | End: 2022-04-06 | Stop reason: SURG

## 2022-04-06 RX ORDER — OXYCODONE HCL 5 MG/5 ML
10 SOLUTION, ORAL ORAL
Status: COMPLETED | OUTPATIENT
Start: 2022-04-06 | End: 2022-04-06

## 2022-04-06 RX ORDER — DEXAMETHASONE SODIUM PHOSPHATE 4 MG/ML
INJECTION, SOLUTION INTRA-ARTICULAR; INTRALESIONAL; INTRAMUSCULAR; INTRAVENOUS; SOFT TISSUE PRN
Status: DISCONTINUED | OUTPATIENT
Start: 2022-04-06 | End: 2022-04-06 | Stop reason: SURG

## 2022-04-06 RX ORDER — ACETAMINOPHEN 500 MG
1000 TABLET ORAL EVERY 6 HOURS PRN
Status: DISCONTINUED | OUTPATIENT
Start: 2022-04-11 | End: 2022-04-07 | Stop reason: HOSPADM

## 2022-04-06 RX ORDER — ALBUTEROL SULFATE 90 UG/1
2 AEROSOL, METERED RESPIRATORY (INHALATION) EVERY 4 HOURS PRN
Status: DISCONTINUED | OUTPATIENT
Start: 2022-04-06 | End: 2022-04-07 | Stop reason: HOSPADM

## 2022-04-06 RX ORDER — TAMSULOSIN HYDROCHLORIDE 0.4 MG/1
0.4 CAPSULE ORAL 2 TIMES DAILY
Status: DISCONTINUED | OUTPATIENT
Start: 2022-04-06 | End: 2022-04-07 | Stop reason: HOSPADM

## 2022-04-06 RX ORDER — CEFAZOLIN SODIUM 1 G/3ML
INJECTION, POWDER, FOR SOLUTION INTRAMUSCULAR; INTRAVENOUS PRN
Status: DISCONTINUED | OUTPATIENT
Start: 2022-04-06 | End: 2022-04-06 | Stop reason: SURG

## 2022-04-06 RX ORDER — LIDOCAINE HYDROCHLORIDE 40 MG/ML
SOLUTION TOPICAL PRN
Status: DISCONTINUED | OUTPATIENT
Start: 2022-04-06 | End: 2022-04-06 | Stop reason: SURG

## 2022-04-06 RX ORDER — OXYCODONE HYDROCHLORIDE 5 MG/1
5 TABLET ORAL
Status: DISCONTINUED | OUTPATIENT
Start: 2022-04-06 | End: 2022-04-07 | Stop reason: HOSPADM

## 2022-04-06 RX ORDER — ACETAMINOPHEN 500 MG
1000 TABLET ORAL ONCE
Status: COMPLETED | OUTPATIENT
Start: 2022-04-06 | End: 2022-04-06

## 2022-04-06 RX ORDER — IBUPROFEN 800 MG/1
800 TABLET ORAL 3 TIMES DAILY PRN
Status: DISCONTINUED | OUTPATIENT
Start: 2022-04-11 | End: 2022-04-07 | Stop reason: HOSPADM

## 2022-04-06 RX ORDER — BUPROPION HYDROCHLORIDE 150 MG/1
150 TABLET, EXTENDED RELEASE ORAL 2 TIMES DAILY
Status: DISCONTINUED | OUTPATIENT
Start: 2022-04-06 | End: 2022-04-07 | Stop reason: HOSPADM

## 2022-04-06 RX ORDER — HYDROMORPHONE HYDROCHLORIDE 1 MG/ML
0.5 INJECTION, SOLUTION INTRAMUSCULAR; INTRAVENOUS; SUBCUTANEOUS
Status: DISCONTINUED | OUTPATIENT
Start: 2022-04-06 | End: 2022-04-07 | Stop reason: HOSPADM

## 2022-04-06 RX ORDER — FENOFIBRATE 67 MG/1
134 CAPSULE ORAL EVERY EVENING
Status: DISCONTINUED | OUTPATIENT
Start: 2022-04-06 | End: 2022-04-07 | Stop reason: HOSPADM

## 2022-04-06 RX ORDER — BUPIVACAINE HYDROCHLORIDE AND EPINEPHRINE 5; 5 MG/ML; UG/ML
INJECTION, SOLUTION EPIDURAL; INTRACAUDAL; PERINEURAL
Status: DISCONTINUED | OUTPATIENT
Start: 2022-04-06 | End: 2022-04-06 | Stop reason: HOSPADM

## 2022-04-06 RX ORDER — ONDANSETRON 2 MG/ML
4 INJECTION INTRAMUSCULAR; INTRAVENOUS EVERY 4 HOURS PRN
Status: DISCONTINUED | OUTPATIENT
Start: 2022-04-06 | End: 2022-04-07 | Stop reason: HOSPADM

## 2022-04-06 RX ORDER — SODIUM CHLORIDE, SODIUM LACTATE, POTASSIUM CHLORIDE, CALCIUM CHLORIDE 600; 310; 30; 20 MG/100ML; MG/100ML; MG/100ML; MG/100ML
INJECTION, SOLUTION INTRAVENOUS CONTINUOUS
Status: ACTIVE | OUTPATIENT
Start: 2022-04-06 | End: 2022-04-06

## 2022-04-06 RX ORDER — ALPRAZOLAM 0.5 MG/1
0.5 TABLET ORAL NIGHTLY PRN
Status: DISCONTINUED | OUTPATIENT
Start: 2022-04-06 | End: 2022-04-07 | Stop reason: HOSPADM

## 2022-04-06 RX ORDER — HALOPERIDOL 5 MG/ML
1 INJECTION INTRAMUSCULAR
Status: DISCONTINUED | OUTPATIENT
Start: 2022-04-06 | End: 2022-04-06

## 2022-04-06 RX ORDER — ONDANSETRON 2 MG/ML
4 INJECTION INTRAMUSCULAR; INTRAVENOUS
Status: DISCONTINUED | OUTPATIENT
Start: 2022-04-06 | End: 2022-04-06

## 2022-04-06 RX ORDER — OMEPRAZOLE 20 MG/1
20 CAPSULE, DELAYED RELEASE ORAL EVERY EVENING
Status: DISCONTINUED | OUTPATIENT
Start: 2022-04-06 | End: 2022-04-07 | Stop reason: HOSPADM

## 2022-04-06 RX ORDER — DIPHENHYDRAMINE HYDROCHLORIDE 50 MG/ML
12.5 INJECTION INTRAMUSCULAR; INTRAVENOUS
Status: DISCONTINUED | OUTPATIENT
Start: 2022-04-06 | End: 2022-04-06

## 2022-04-06 RX ORDER — MIDAZOLAM HYDROCHLORIDE 1 MG/ML
INJECTION INTRAMUSCULAR; INTRAVENOUS PRN
Status: DISCONTINUED | OUTPATIENT
Start: 2022-04-06 | End: 2022-04-06 | Stop reason: SURG

## 2022-04-06 RX ADMIN — DEXAMETHASONE SODIUM PHOSPHATE 8 MG: 4 INJECTION, SOLUTION INTRA-ARTICULAR; INTRALESIONAL; INTRAMUSCULAR; INTRAVENOUS; SOFT TISSUE at 12:08

## 2022-04-06 RX ADMIN — CEFAZOLIN 2 G: 330 INJECTION, POWDER, FOR SOLUTION INTRAMUSCULAR; INTRAVENOUS at 11:48

## 2022-04-06 RX ADMIN — FENTANYL CITRATE 50 MCG: 50 INJECTION, SOLUTION INTRAMUSCULAR; INTRAVENOUS at 15:01

## 2022-04-06 RX ADMIN — FENTANYL CITRATE 50 MCG: 50 INJECTION, SOLUTION INTRAMUSCULAR; INTRAVENOUS at 13:15

## 2022-04-06 RX ADMIN — FLUVOXAMINE MALEATE 100 MG: 50 TABLET, COATED ORAL at 22:29

## 2022-04-06 RX ADMIN — PROPOFOL 50 MG: 10 INJECTION, EMULSION INTRAVENOUS at 12:29

## 2022-04-06 RX ADMIN — MIDAZOLAM HYDROCHLORIDE 1 MG: 1 INJECTION, SOLUTION INTRAMUSCULAR; INTRAVENOUS at 11:45

## 2022-04-06 RX ADMIN — FENTANYL CITRATE 50 MCG: 50 INJECTION, SOLUTION INTRAMUSCULAR; INTRAVENOUS at 11:43

## 2022-04-06 RX ADMIN — MIDAZOLAM HYDROCHLORIDE 1 MG: 1 INJECTION, SOLUTION INTRAMUSCULAR; INTRAVENOUS at 11:43

## 2022-04-06 RX ADMIN — OXYCODONE HYDROCHLORIDE 10 MG: 10 TABLET ORAL at 19:52

## 2022-04-06 RX ADMIN — FENTANYL CITRATE 50 MCG: 50 INJECTION, SOLUTION INTRAMUSCULAR; INTRAVENOUS at 12:56

## 2022-04-06 RX ADMIN — ROCURONIUM BROMIDE 50 MG: 10 INJECTION, SOLUTION INTRAVENOUS at 11:45

## 2022-04-06 RX ADMIN — ACETAMINOPHEN 1000 MG: 500 TABLET ORAL at 18:51

## 2022-04-06 RX ADMIN — DEXMEDETOMIDINE 20 MCG: 200 INJECTION, SOLUTION INTRAVENOUS at 12:20

## 2022-04-06 RX ADMIN — HYDROMORPHONE HYDROCHLORIDE 0.5 MG: 1 INJECTION, SOLUTION INTRAMUSCULAR; INTRAVENOUS; SUBCUTANEOUS at 16:51

## 2022-04-06 RX ADMIN — OMEPRAZOLE 20 MG: 20 CAPSULE, DELAYED RELEASE ORAL at 21:33

## 2022-04-06 RX ADMIN — PROPOFOL 200 MG: 10 INJECTION, EMULSION INTRAVENOUS at 11:45

## 2022-04-06 RX ADMIN — FENTANYL CITRATE 50 MCG: 50 INJECTION, SOLUTION INTRAMUSCULAR; INTRAVENOUS at 15:17

## 2022-04-06 RX ADMIN — ACETAMINOPHEN 1000 MG: 500 TABLET ORAL at 11:06

## 2022-04-06 RX ADMIN — FENOFIBRATE 134 MG: 67 CAPSULE ORAL at 18:50

## 2022-04-06 RX ADMIN — SODIUM CHLORIDE, POTASSIUM CHLORIDE, SODIUM LACTATE AND CALCIUM CHLORIDE: 600; 310; 30; 20 INJECTION, SOLUTION INTRAVENOUS at 11:06

## 2022-04-06 RX ADMIN — DEXMEDETOMIDINE 10 MCG: 200 INJECTION, SOLUTION INTRAVENOUS at 12:11

## 2022-04-06 RX ADMIN — OXYCODONE HYDROCHLORIDE 10 MG: 5 SOLUTION ORAL at 13:14

## 2022-04-06 RX ADMIN — BUSPIRONE HYDROCHLORIDE 30 MG: 10 TABLET ORAL at 18:50

## 2022-04-06 RX ADMIN — LIDOCAINE HYDROCHLORIDE 4 ML: 40 SOLUTION TOPICAL at 11:46

## 2022-04-06 RX ADMIN — TAMSULOSIN HYDROCHLORIDE 0.4 MG: 0.4 CAPSULE ORAL at 18:51

## 2022-04-06 RX ADMIN — SUGAMMADEX 200 MG: 100 INJECTION, SOLUTION INTRAVENOUS at 12:33

## 2022-04-06 RX ADMIN — ONDANSETRON 4 MG: 2 INJECTION INTRAMUSCULAR; INTRAVENOUS at 12:29

## 2022-04-06 RX ADMIN — IBUPROFEN 800 MG: 800 TABLET, FILM COATED ORAL at 18:50

## 2022-04-06 RX ADMIN — FENTANYL CITRATE 50 MCG: 50 INJECTION, SOLUTION INTRAMUSCULAR; INTRAVENOUS at 11:45

## 2022-04-06 RX ADMIN — CELECOXIB 200 MG: 200 CAPSULE ORAL at 11:06

## 2022-04-06 RX ADMIN — BUPROPION HYDROCHLORIDE 150 MG: 150 TABLET, EXTENDED RELEASE ORAL at 18:51

## 2022-04-06 ASSESSMENT — LIFESTYLE VARIABLES
CONSUMPTION TOTAL: NEGATIVE
DOES PATIENT WANT TO STOP DRINKING: NO
EVER HAD A DRINK FIRST THING IN THE MORNING TO STEADY YOUR NERVES TO GET RID OF A HANGOVER: NO
TOTAL SCORE: 0
ON A TYPICAL DAY WHEN YOU DRINK ALCOHOL HOW MANY DRINKS DO YOU HAVE: 0
HAVE PEOPLE ANNOYED YOU BY CRITICIZING YOUR DRINKING: NO
ALCOHOL_USE: YES
HOW MANY TIMES IN THE PAST YEAR HAVE YOU HAD 5 OR MORE DRINKS IN A DAY: 0
AVERAGE NUMBER OF DAYS PER WEEK YOU HAVE A DRINK CONTAINING ALCOHOL: 0
TOTAL SCORE: 0
EVER FELT BAD OR GUILTY ABOUT YOUR DRINKING: NO
TOTAL SCORE: 0
HAVE YOU EVER FELT YOU SHOULD CUT DOWN ON YOUR DRINKING: NO

## 2022-04-06 ASSESSMENT — COGNITIVE AND FUNCTIONAL STATUS - GENERAL
MOVING FROM LYING ON BACK TO SITTING ON SIDE OF FLAT BED: A LITTLE
DAILY ACTIVITIY SCORE: 20
DRESSING REGULAR UPPER BODY CLOTHING: A LITTLE
HELP NEEDED FOR BATHING: A LITTLE
CLIMB 3 TO 5 STEPS WITH RAILING: A LITTLE
DRESSING REGULAR LOWER BODY CLOTHING: A LITTLE
MOVING TO AND FROM BED TO CHAIR: A LITTLE
SUGGESTED CMS G CODE MODIFIER DAILY ACTIVITY: CJ
TURNING FROM BACK TO SIDE WHILE IN FLAT BAD: A LITTLE
MOBILITY SCORE: 18
STANDING UP FROM CHAIR USING ARMS: A LITTLE
TOILETING: A LITTLE
SUGGESTED CMS G CODE MODIFIER MOBILITY: CK
WALKING IN HOSPITAL ROOM: A LITTLE

## 2022-04-06 ASSESSMENT — PATIENT HEALTH QUESTIONNAIRE - PHQ9
SUM OF ALL RESPONSES TO PHQ9 QUESTIONS 1 AND 2: 0
2. FEELING DOWN, DEPRESSED, IRRITABLE, OR HOPELESS: NOT AT ALL
1. LITTLE INTEREST OR PLEASURE IN DOING THINGS: NOT AT ALL

## 2022-04-06 ASSESSMENT — PAIN DESCRIPTION - PAIN TYPE
TYPE: SURGICAL PAIN
TYPE: SURGICAL PAIN;REFERRED PAIN
TYPE: SURGICAL PAIN

## 2022-04-06 ASSESSMENT — FIBROSIS 4 INDEX: FIB4 SCORE: 0.9

## 2022-04-06 ASSESSMENT — PAIN SCALES - WONG BAKER
WONGBAKER_NUMERICALRESPONSE: HURTS A WHOLE LOT
WONGBAKER_NUMERICALRESPONSE: HURTS A WHOLE LOT

## 2022-04-06 ASSESSMENT — PAIN SCALES - GENERAL: PAIN_LEVEL: 2

## 2022-04-06 NOTE — OR NURSING
Arrived to PACU in stable condition. Medicated for pain with IV and oral pain medications. Chest tube to left side intact and set 20cm . Labs and xray completed and wife has been updated. He is taking po well and denies nausea. Ambulated to his bed and tolerated it well. Minimal drainage in the chest tube.

## 2022-04-06 NOTE — ANESTHESIA PROCEDURE NOTES
Airway    Date/Time: 4/6/2022 11:46 AM  Performed by: Arthur Ruano M.D.  Authorized by: Arthur Ruano M.D.     Location:  OR  Urgency:  Elective  Indications for Airway Management:  Anesthesia      Spontaneous Ventilation: absent    Sedation Level:  Deep  Preoxygenated: Yes    Patient Position:  Sniffing  Final Airway Type:  Endotracheal airway  Final Endotracheal Airway:  ETT - double lumen left with ONE LUNG VENTILATION  Cuffed: Yes    Technique Used for Successful ETT Placement:  Direct laryngoscopy    Insertion Site:  Oral  Blade Type:  [place]  Laryngoscope Blade/Videolaryngoscope Blade Size:  3  ETT Double Lumen (fr):  39  Measured from:  Teeth  ETT to Teeth (cm):  0  Placement Verified by: auscultation and capnometry    Cormack-Lehane Classification:  Grade I - full view of glottis  Number of Attempts at Approach:  1

## 2022-04-06 NOTE — ANESTHESIA PROCEDURE NOTES
Arterial Line  Performed by: Arthur Ruano M.D.  Authorized by: Arthur Ruano M.D.     Start Time:  4/6/2022 11:43 AM  Localization: surface landmarks    Patient Location:  OR  Indication: continuous blood pressure monitoring        Catheter Size:  20 G  Seldinger Technique?: Yes    Laterality:  Right  Site:  Radial artery  Line Secured:  Antimicrobial disc, tape and transparent dressing  Events: patient tolerated procedure well with no complications

## 2022-04-06 NOTE — ANESTHESIA TIME REPORT
Anesthesia Start and Stop Event Times     Date Time Event    4/6/2022 1138 Ready for Procedure     1138 Anesthesia Start     1243 Anesthesia Stop        Responsible Staff  04/06/22    Name Role Begin End    Arthur Ruano M.D. Anesth 1138 1243        Overtime Reason:  no overtime (within assigned shift)    Comments:

## 2022-04-06 NOTE — OP REPORT
Operative Report    Date: 4/6/2022    PreOp Diagnosis:   1.  Adenocarcinoma, left upper lobe, invading mediastinum    PostOp Diagnosis:   1.  Adenocarcinoma, left upper lobe, invading mediastinum, widely disseminated pleural disease      Procedure(s):  1.  Left THORACOSCOPY - VIDEO ASSISTED, with PLEURAL BIOPSY - Wound Class: Clean with Drain  2.  Posterior rib blocks, multiple    Surgeon(s):  Sarwat Humphrey M.D.    Assistant:   JERZY Mcdonough  (a surgical first assistant was required for the entire procedure for help with patient positioning, placement of incisions, management of the thoracoscopic camera, retraction of critical structures, and closure of incisions)    Anesthesiologist/Type of Anesthesia:  Anesthesiologist: Arthur Ruano M.D./General    Surgical Staff:  Circulator: Lenore Galeano R.N.  Relief Scrub: Kaycee Bui  Scrub Person: Chapo Judd    Specimens removed if any:  ID Type Source Tests Collected by Time Destination   A : Left Pleural Plaque Tissue Lung PATHOLOGY SPECIMEN Sarwat Humphrey M.D. 4/6/2022 12:14 PM    B : Left Pleural Plaque Tissue Lung PATHOLOGY SPECIMEN Sarwat Humphrey M.D. 4/6/2022 12:24 PM      Drains: 19 Malawian Kali drain, left pleural space    Estimated Blood Loss: Minimal    Findings: No endobronchial lesions noted.  Widely disseminated pleural plaques consistent with adenocarcinoma on frozen section.  Primary left upper lobe lung lesion seen invading the mediastinum    Complications: None noted    Outcome: Transferred to PACU in stable condition    Indications:  51-year-old male with a history of a left upper lobe lung mass of approximately 2.5 cm which is biopsy-proven adenocarcinoma invading the mediastinum.  PET scan was performed which showed uptake at the lesion only.  This case was presented at multidisciplinary tumor board, who felt that surgical evaluation/management would be the most appropriate step.  Left thoracoscopy with sublobar resection  and possible mediastinal lymph node sampling was planned and discussed with him in detail including the risk, benefits, alternatives, and he wished to proceed.    Procedure in detail:   The patient was taken to the operating room placed in the operating table in supine position.  General endotracheal anesthesia was induced.  Flexible bronchoscopy was performed which did not reveal any endobronchial lesions.  The patient was then placed in right lateral decubitus position exposing the left chest.  Care was taken to pad all pressure points.  A sterile prep and drape was performed in the usual fashion over the left chest.  A timeout was performed.    Following injection of local anesthetic, a 5 mm incision was made in the posterior axillary line approximately the eighth intercostal space.  A 5 mm thoracoscopic port was introduced followed by a 5 mm 30 degree thoracoscope.  The left pleural space was examined.  There is no obvious pleural effusion, but there were multiple sites of suspicious appearing pleural plaques throughout the left parietal pleural space.  The primary lesion was seen in the left upper lobe medially invading the mediastinum and was left alone.  2 more 5 mm ports were placed; one in the anterior axillary line at approximately the fourth intercostal space and one in the anterior axillary line at approximately the sixth intercostal space.  One of the pleural plaques was harvested using hook electrocautery and blunt dissection, and sent for frozen specimen.  This returned as adenocarcinoma.  Another portion of the pleural plaque was sent for permanent pathology.  Hemostasis was ensured.  A 19 Urdu Kali drain was then placed in the left pleural space and brought out through the inferior 5 mm port.  This was sutured in place using 0 silk in the standard fashion.  The lung was allowed to reinflate under direct vision and came up well.  All ports were then removed and the incisions closed in with 4-0  Vicryl and Dermabond.  An appropriate chest tube dressing was applied.  The chest tube was placed to Pleur-evac suction.  Multiple posterior rib blocks were then performed in the usual fashion.  This concluded the procedure.  The patient was allowed to emerge from general anesthesia was extubated taken back in stable condition.  Sponge, instrument, needle counts were correct x2.      Sarwat Humphrey M.D.  [facility]  [phone]      4/6/2022 12:32 PM Sarwat Humphrey M.D.

## 2022-04-06 NOTE — ANESTHESIA POSTPROCEDURE EVALUATION
Patient: Luciano Silverman    Procedure Summary     Date: 04/06/22 Room / Location: [place] [address] / SURGERY [facility]    Anesthesia Start: 1138 Anesthesia Stop: 1243    Procedure: THORACOSCOPY - VIDEO ASSISTED, PLEURAL BIOPSY (Left Chest) Diagnosis: (NON-SMALL CELL LUNG CANCER)    Surgeons: Sarwat Humphrey M.D. Responsible Provider: Arthur Ruano M.D.    Anesthesia Type: general, peripheral nerve block ASA Status: 3          Final Anesthesia Type: general, peripheral nerve block  Last vitals  BP   Blood Pressure: 145/68    Temp   36.2 °C (97.1 °F)    Pulse   66   Resp   16    SpO2   96 %      Anesthesia Post Evaluation    Patient location during evaluation: PACU  Patient participation: complete - patient participated  Level of consciousness: awake and alert  Pain score: 2    Airway patency: patent  Anesthetic complications: no  Cardiovascular status: hemodynamically stable  Respiratory status: acceptable  Hydration status: euvolemic    PONV: none          No complications documented.     Nurse Pain Score: 5 (NPRS)

## 2022-04-06 NOTE — ANESTHESIA PREPROCEDURE EVALUATION
Case: [id number] Date/Time: 04/06/22 1145    Procedures:       THORACOSCOPY - VIDEO ASSISTED, SUBLOBAR RESECTION UPPER LOBE, MEDIASTINAL LYMPH NODE SAMPLING, POSSIBLE UPPER LOBECTOMY (Left )      THORACOTOMY - POSSIBLE    Pre-op diagnosis: NON-SMALL CELL LUNG CANCER    Location: [facility] OR 11 / SURGERY [facility]    Surgeons: Sarwat Humphrey M.D.          Relevant Problems   ANESTHESIA   (positive) Obstructive sleep apnea of adult      PULMONARY   (positive) COPD (chronic obstructive pulmonary disease) (HCC)      NEURO   (positive) Cerebrovascular accident (CVA) due to embolism of cerebral artery (HCC)   (positive) History of stroke      CARDIAC   (positive) Atherosclerosis   (positive) Bilateral carotid artery stenosis   (positive) Coronary artery disease involving native coronary artery without angina pectoris   (positive) Essential hypertension      GI   (positive) Gastroesophageal reflux disease without esophagitis         (positive) Hepatomegaly   (positive) Nonalcoholic fatty liver disease       Physical Exam    Anesthesia Plan    ASA 3   ASA physical status 3 criteria: COPD    Plan - general and peripheral nerve block     Peripheral nerve block will be post-op pain control  Airway plan will be ETT          Induction: intravenous    Postoperative Plan: Postoperative administration of opioids is intended.    Pertinent diagnostic labs and testing reviewed    Informed Consent:    Anesthetic plan and risks discussed with patient.    Use of blood products discussed with: patient whom consented to blood products.

## 2022-04-07 ENCOUNTER — PATIENT OUTREACH (OUTPATIENT)
Dept: HEALTH INFORMATION MANAGEMENT | Facility: OTHER | Age: 52
End: 2022-04-07
Payer: COMMERCIAL

## 2022-04-07 VITALS
DIASTOLIC BLOOD PRESSURE: 72 MMHG | SYSTOLIC BLOOD PRESSURE: 135 MMHG | WEIGHT: 186.29 LBS | HEIGHT: 69 IN | HEART RATE: 64 BPM | OXYGEN SATURATION: 97 % | TEMPERATURE: 98.1 F | BODY MASS INDEX: 27.59 KG/M2 | RESPIRATION RATE: 17 BRPM

## 2022-04-07 PROCEDURE — 700111 HCHG RX REV CODE 636 W/ 250 OVERRIDE (IP): Performed by: SURGERY

## 2022-04-07 PROCEDURE — A9270 NON-COVERED ITEM OR SERVICE: HCPCS | Performed by: SURGERY

## 2022-04-07 PROCEDURE — 700102 HCHG RX REV CODE 250 W/ 637 OVERRIDE(OP): Performed by: SURGERY

## 2022-04-07 PROCEDURE — C1729 CATH, DRAINAGE: HCPCS | Performed by: SURGERY

## 2022-04-07 RX ORDER — OXYCODONE HYDROCHLORIDE 5 MG/1
5 TABLET ORAL EVERY 6 HOURS PRN
Qty: 12 TABLET | Refills: 0 | Status: SHIPPED | OUTPATIENT
Start: 2022-04-07 | End: 2022-04-10

## 2022-04-07 RX ORDER — IBUPROFEN 800 MG/1
800 TABLET ORAL
Qty: 21 TABLET | Refills: 0 | Status: SHIPPED | OUTPATIENT
Start: 2022-04-07 | End: 2022-04-14

## 2022-04-07 RX ORDER — ACETAMINOPHEN 500 MG
1000 TABLET ORAL EVERY 6 HOURS
Qty: 56 TABLET | Refills: 0 | Status: SHIPPED | OUTPATIENT
Start: 2022-04-07 | End: 2022-04-14

## 2022-04-07 RX ADMIN — BUPROPION HYDROCHLORIDE 150 MG: 150 TABLET, EXTENDED RELEASE ORAL at 05:26

## 2022-04-07 RX ADMIN — OXYCODONE HYDROCHLORIDE 10 MG: 10 TABLET ORAL at 07:57

## 2022-04-07 RX ADMIN — TAMSULOSIN HYDROCHLORIDE 0.4 MG: 0.4 CAPSULE ORAL at 05:27

## 2022-04-07 RX ADMIN — ENOXAPARIN SODIUM 40 MG: 40 INJECTION SUBCUTANEOUS at 05:26

## 2022-04-07 RX ADMIN — BUSPIRONE HYDROCHLORIDE 30 MG: 10 TABLET ORAL at 05:27

## 2022-04-07 RX ADMIN — IBUPROFEN 800 MG: 800 TABLET, FILM COATED ORAL at 05:31

## 2022-04-07 RX ADMIN — OXYCODONE HYDROCHLORIDE 10 MG: 10 TABLET ORAL at 03:19

## 2022-04-07 RX ADMIN — ACETAMINOPHEN 1000 MG: 500 TABLET ORAL at 05:26

## 2022-04-07 ASSESSMENT — PAIN DESCRIPTION - PAIN TYPE
TYPE: SURGICAL PAIN
TYPE: ACUTE PAIN
TYPE: ACUTE PAIN
TYPE: SURGICAL PAIN

## 2022-04-07 NOTE — PROGRESS NOTES
Received report from WYATT Pandey  Pt is AAOx4  LOVELL  VSS  Pain 7/10; oxy 10 given  Denies SOB. Pt on 1L of O2; will attempt to wean  L side CT to -20 of suction. Found Pleurovac tipped over this am; replaced. No leaks or subQ air noted around site  +BS +Flatus Last BM pta  Pt up 1x assist w/ cane.  POC discussed  Bed locked and in the lowest position  Call light w/in reach  Hourly rounding in place

## 2022-04-07 NOTE — CARE PLAN
Problem: Pain - Standard  Goal: Alleviation of pain or a reduction in pain to the patient’s comfort goal  Outcome: Progressing     Problem: Knowledge Deficit - Standard  Goal: Patient and family/care givers will demonstrate understanding of plan of care, disease process/condition, diagnostic tests and medications  Outcome: Progressing     Problem: Fall Risk  Goal: Patient will remain free from falls  Outcome: Progressing     The patient is Stable - Low risk of patient condition declining or worsening    Shift Goals  Clinical Goals: pain control, rest, ct managment  Patient Goals: pain management, rest  Family Goals: no family present    Progress made toward(s) clinical / shift goals:  Patient reporting mild to high pain. Pain well controlled by PRN pain medication. Patient educated on the use of the call light and calling appropriately.     Patient is not progressing towards the following goals:

## 2022-04-07 NOTE — DISCHARGE INSTRUCTIONS
Discharge Instructions    Discharged to home by car with relative. Discharged via wheelchair, hospital escort: Yes.  Special equipment needed: Not Applicable    Be sure to schedule a follow-up appointment with your primary care doctor or any specialists as instructed.     Discharge Plan:        I understand that a diet low in cholesterol, fat, and sodium is recommended for good health. Unless I have been given specific instructions below for another diet, I accept this instruction as my diet prescription.   Other diet: Heart healthy diet    Special Instructions:    D/C  instructions:    1. DIET: Upon discharge from the hospital you may continue regular diet as tolerated    2. ACTIVITIES: After discharge from the hospital, you may resume full routine activities, as tolerated.    3. DRIVING: You may drive whenever you are off pain medications and are able to perform the activities needed to drive, i.e. turning, bending, twisting, etc.    4. BATHING: You may get the wound wet at any time after leaving the hospital. You may shower, but do not submerge in a bath for at least a week.    5. BOWEL FUNCTION: A few patients, after this operation, will develop either frequent or loose stools after meals. This usually corrects itself after a few days, to a few weeks. If this occurs, do not worry; it is not unusual and will resolve. Much more common than loose stools, is constipation. The combination of pain medication and decreased activity level can cause constipation in otherwise normal patients. If you feel this is occurring, take a laxative (Milk of Magnesia, Ex-Lax, Senokot, etc.) until the problem has resolved.    6. PAIN MEDICATION: You will be given a prescription for pain medication at discharge. Please take these as directed. It is important to remember not to take medications on an empty stomach as this may cause nausea.    7. CALL IF YOU HAVE: (1) Fevers to more than 101.5 degrees F, shortness of breath (2) Unusual  chest or leg pain, (3) Drainage or fluid from incision that may be foul smelling, increased tenderness or soreness at the wound or the wound edges are no longer together, redness or swelling at the incision site. Please do not hesitate to call with any other questions.     8. APPOINTMENT: Contact our office at [phone] for a follow-up appointment in 1 to 2 weeks following your procedure.    If you have any additional questions, please do not hesitate to call the office and speak to either myself or the physician on call.    Office address:  [address] [place], [place] [address]e [facility] [address], [place] [address]      Sarwat Humphrey M.D.  [facility]  [phone]        · Is patient discharged on Warfarin / Coumadin?   No     Depression / Suicide Risk    As you are discharged from this [facility], it is important to learn how to keep safe from harming yourself.    Recognize the warning signs:  · Abrupt changes in personality, positive or negative- including increase in energy   · Giving away possessions  · Change in eating patterns- significant weight changes-  positive or negative  · Change in sleeping patterns- unable to sleep or sleeping all the time   · Unwillingness or inability to communicate  · Depression  · Unusual sadness, discouragement and loneliness  · Talk of wanting to die  · Neglect of personal appearance   · Rebelliousness- reckless behavior  · Withdrawal from people/activities they love  · Confusion- inability to concentrate     If you or a loved one observes any of these behaviors or has concerns about self-harm, here's what you can do:  · Talk about it- your feelings and reasons for harming yourself  · Remove any means that you might use to hurt yourself (examples: pills, rope, extension cords, firearm)  · Get professional help from the community (Mental Health, Substance Abuse, psychological counseling)  · Do not be alone:Call your Safe Contact- someone whom you trust who will be there for you.  · Call  your local CRISIS HOTLINE 476-4334 or 181-071-3345  · Call your local Children's Mobile Crisis Response Team [place] (636) 125-1833 or www.[employer]  · Call the toll free National Suicide Prevention Hotlines   · National Suicide Prevention Lifeline 714-506-GTSI (4088)  · National Hope Line Network 800-SUICIDE (142-0640)              Thoracoscopy, Care After  This sheet gives you information about how to care for yourself after your procedure. Your health care provider may also give you more specific instructions. If you have problems or questions, contact your health care provider.  What can I expect after the procedure?  After the procedure, it is common to have pain and soreness in the surgical area.  Follow these instructions at home:  Incision care    · Follow instructions from your health care provider about how to take care of your incision. Make sure you:  ? Wash your hands with soap and water before you change your bandage (dressing). If soap and water are not available, use hand .  ? Change your dressing as told by your health care provider.  ? Leave stitches (sutures), skin glue, or adhesive strips in place. These skin closures may need to stay in place for 2 weeks or longer. If adhesive strip edges start to loosen and curl up, you may trim the loose edges. Do not remove adhesive strips completely unless your health care provider tells you to do that.  · Check your incision areas every day for signs of infection. Check for:  ? Redness, swelling, or pain.  ? Fluid or blood.  ? Warmth.  ? Pus or a bad smell.  · Do not take baths, swim, or use a hot tub until your health care provider approves. You may take showers.  Medicines  · Take over-the-counter and prescription medicines only as told by your health care provider.  · If you were prescribed an antibiotic medicine, take it as told by your health care provider. Do not stop taking the antibiotic even if you start to feel better.  · Do  not drive or use heavy machinery while taking prescription pain medicine.  · If you are taking prescription pain medicine, take actions to prevent or treat constipation. Your health care provider may recommend that you:  ? Drink enough fluid to keep your urine pale yellow.  ? Eat foods that are high in fiber, such as fresh fruits and vegetables, whole grains, and beans.  ? Limit foods that are high in fat and processed sugars, such as fried and sweet foods.  ? Take an over-the-counter or prescription medicine for constipation.  Managing pain, stiffness, and swelling    · If directed, put ice on the affected area:  ? Put ice in a plastic bag.  ? Place a towel between your skin and the bag.  ? Leave the ice on for 20 minutes, 2-3 times a day.  Preventing lung infection  · To prevent pneumonia and to keep your lungs healthy:  ? Try to cough often. If it hurts to cough, hold a pillow against your chest as you cough.  ? Take deep breaths or do breathing exercises as instructed by your health care provider.  ? If you were given an incentive spirometer, use it as directed by your health care provider.  General instructions  · Do not lift anything that is heavier than 10 lb (4.5 kg), or the limit that you are told, until your health care provider says that it is safe.  · Do not use any products that contain nicotine or tobacco, such as cigarettes and e-cigarettes. These can delay healing after surgery. If you need help quitting, ask your health care provider.  · Avoid driving until your health care provider approves.  · If you have a chest drainage tube, care for it as instructed by your health care provider. Do not travel by airplane after the chest drainage tube is removed until your health care provider approves.  · Keep all follow-up visits as told by your health care provider. This is important.  Contact a health care provider if:  · You have a fever.  · Pain medicines do not ease your pain.  · You have redness,  swelling, or increasing pain in your incision area.  · You develop a cough that does not go away, or you are coughing up mucus that is yellow or green.  Get help right away if:  · You have fluid, blood, or pus coming from your incision.  · There is a bad smell coming from your incision or dressing.  · You develop a rash.  · You cough up blood.  · You develop light-headedness, or you feel faint.  · You have difficulty breathing.  · You develop chest pain.  · Your heartbeat feels irregular or very fast.  These symptoms may represent a serious problem that is an emergency. Do not wait to see if the symptoms will go away. Get medical help right away. Call your local emergency services (911 in the [place] Do not drive yourself to the hospital.  Summary  · Follow instructions from your health care provider about how to take care of your incision.  · Do not drive or use heavy machinery while taking prescription pain medicine.  · Leave stitches (sutures), skin glue, or adhesive strips in place.  · Check your incision areas every day for signs of infection.  This information is not intended to replace advice given to you by your health care provider. Make sure you discuss any questions you have with your health care provider.  Document Released: 07/07/2006 Document Revised: 11/30/2018 Document Reviewed: 11/27/2018  Elsevier Patient Education © 2020 [employer]

## 2022-04-20 NOTE — PROGRESS NOTES
"                                  [facility] PSYCHI[facility]RIC FOLLOW-UP NOTE    This provider informed the patient their medical records are totally confidential except for the use by other providers involved in their care, or if the patient signs a release, or to report instances of child or elder abuse, or if it is determined they are an immediate risk to harm themselves or others.  To avoid spread of covid-19 virus this follow up appointment was conducted face-to-face wearing masks and a face shield.    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  Having depressed mood, panic attacks, and traumatic recollections.  HISTORY OF PRESENT ILLNESS       The patient is a  49yo W male who has been disabled by 2 strokes in 2016 and 2018 and he lives with his 3rd wife and off of her income.  He is followed by this provider for recurrent Major Depression, Panic Disorder with Agoraphobia, and Posttraumatic Stress Disorder.  He has had depressive episodes since he had a cerebrovascular accident in Aug 2016.  He had (L) sided weakness and his depression had a typical pattern of anhedonia, decreased sleep and appetite, feeling worthless and hopeless, and having recurrent passive suicidal ideation that \"he'd be better off dead\".  He denies ever having an overt suicidal plan, intent, or attempt.       He has had Panic Disorder with Agoraphobia since his strokes and his panic attacks are triggered when he goes out into public.  He has some symptoms of PTSD with no clear Criterion A but \"he grew up in the streets\" and he saw somebody kill themselves, intrusive trauma recollections and dreams, avoidance and hypervigilance.  His anxiety has been elevated because of the corona virus pandemic and he has isolated himself at home except for driving his 21yo son to work.       He smokes cannabis every " night.  He has some neurocognitive impairment since his strokes and has difficulty with dysarthria, word finding, recent memory and weakness in his (L) hand.       He has been treated with radiation seeds for his prostate cancer.         With his recent lung cancer diagnosis, his anxiety and panic attacks have increased and both he and his wife are depressed.  Discussed with the patient, he shouldn't jump the gun, his lung cancer is stage 4, and treatment options need to be defined.  PSYCHOSOCIAL CHANGES SINCE PREVIOUS CONTACT       The patient's wife has had a total hysterectomy and part of her colon removed because of endometrial polyps.  RESPONSE TO TREATMENT       Having increased anxiety and depression because of recent cancer diagnosis.  PAST PSYCHIATRIC MEDICATIONS       Fluoxetine, buspirone, clonazepam, prazosin, bupropion XL.  MEDICATION SIDE EFFECTS       None    MEDICAL REVIEW OF SYSTEMS:   Constitutional positive - obesity   Eyes negative   Ears/Nose/Mouth/Throat negative   Cardiovascular positive - hyprtension   Respiratory positive - COPD, MARIA ISABEL with CPAP, adenocarcinoma of OSIEL   Gastrointestinal positive - GERD, fatty liver,  diverticulitis   Genitourinary negative   Muscular negative   Integumentary negative   Neurological positive - CVA in 2016 and 2018   Endocrine positive - hyperlipidemia   Hematologic/Lymphatic positive - iron deficiency anemia        MENTAL STATUS EVALUATION  There were no vitals taken for this visit.  Participation: Active verbal participation  Grooming:Neat  Orientation: Fully Oriented  Eye contact: Good  Behavior:Calm   Mood: Depressed and Anxious  Affect: Full range  Thought process: Logical  Thought content:  Within normal limits  Speech: Rate within normal limits and Volume within normal limits  Perception:  Within normal limits  Memory:  No gross evidence of memory deficits  Insight: Good  Judgment: Good  Family/couple interaction observations:   Other:  NARA-7  Questionnaire    Feeling nervous, anxious, or on edge:  3  Not being able to stop or control worryin  Worrying too much about different things:  2  Trouble relaxing:  3  Being so restless that it's hard to sit still:  2  Becoming easily annoyed or irritable: 3    Feeling afraid as if something awful might happen:  2  Total:  17    Interpretation of NARA 7 Total Score   Score Severity :  0-4 No Anxiety   5-9 Mild Anxiety  10-14 Moderate Anxiety  15-21 Severe Anxiety    Depression Screening    Little interest or pleasure in doing things?    2  Feeling down, depressed , or hopeless?   2  Trouble falling or staying asleep, or sleeping too much?    3  Feeling tired or having little energy?    3  Poor appetite or overeating?    2  Feeling bad about yourself - or that you are a failure or have let yourself or your family down?   2  Trouble concentrating on things, such as reading the newspaper or watching television?   3  Moving or speaking so slowly that other people could have noticed.  Or the opposite - being so fidgety or restless that you have been moving around a lot more than usual?    2  Thoughts that you would be better off dead, or of hurting yourself?   2   Patient Health Questionnaire Score:   21      If depressive symptoms identified deferred to follow up visit unless specifically addressed in assesment and plan.    Interpretation of PHQ-9 Total Score   Score Severity   1-4 No Depression   5-9 Mild Depression   10-14 Moderate Depression   15-19 Moderately Severe Depression   20-27 Severe Depression    Current risk:    Suicide: Low   Homicide: Not applicable   Self-harm: Low  Relapse: Moderate  Other:   Crisis Safety Plan reviewed?No  If evidence of imminent risk is present, intervention/plan:    Medical Records/Labs/Diagnostic Tests Reviewed: yes    Medical Records/Labs/Diagnostic Tests Ordered: no    DIAGNOSTIC IMPRESSIONS       (1) Major Depression, Recurrent, Moderate (F33.1)       (2) Panic Disorder  with Agoraphobia (F40.01)       (3) Postraumatic Stress Disorder (F43.10)       (4) Cannabis Dependence (F12.20)    ASSESSMENT AND PLAN       Having better alleviation of depression on an increased [place] of bupropion XL 300mg po QAM.       Continue bupropion XL 300mg po QAM.       Increase fluvoxamine dosage to 100mg po BID.X 1 week, then increase to 100mg po TID thereafter.       Continue buspirone 30mg po BID.       Continue prazosin 4mg po QHS.       Continue clonazepam 0.5mg po BID as needed for panic symptoms.       RTC to  Clinic in 3 months for 30 min follow up with this provider    Time at end of session:  8:30 AM    Greater than 16 minutes spent in psychotherapy exclusive of my E&M.  Topics addressed in psychotherapy include:  His recent lung cancer diagnosis has turned his life upside down.  His concerns with PTSD have faded into the background.  Discussed ways to sustain hope and to see himself as a cancer survivor.

## 2022-04-21 ENCOUNTER — OFFICE VISIT (OUTPATIENT)
Dept: BEHAVIORAL HEALTH | Facility: CLINIC | Age: 52
End: 2022-04-21
Payer: COMMERCIAL

## 2022-04-21 DIAGNOSIS — F40.01 PANIC DISORDER WITH AGORAPHOBIA AND MILD PANIC ATTACKS: ICD-10-CM

## 2022-04-21 DIAGNOSIS — F33.0 MILD EPISODE OF RECURRENT MAJOR DEPRESSIVE DISORDER (HCC): ICD-10-CM

## 2022-04-21 DIAGNOSIS — C34.92 ADENOCARCINOMA, LUNG, LEFT (HCC): ICD-10-CM

## 2022-04-21 DIAGNOSIS — F43.10 POSTTRAUMATIC STRESS DISORDER: ICD-10-CM

## 2022-04-21 DIAGNOSIS — F33.1 MODERATE RECURRENT MAJOR DEPRESSION (HCC): ICD-10-CM

## 2022-04-21 PROCEDURE — 90833 PSYTX W PT W E/M 30 MIN: CPT | Performed by: PSYCHIATRY & NEUROLOGY

## 2022-04-21 PROCEDURE — 99214 OFFICE O/P EST MOD 30 MIN: CPT | Performed by: PSYCHIATRY & NEUROLOGY

## 2022-04-21 RX ORDER — BUPROPION HYDROCHLORIDE 300 MG/1
300 TABLET ORAL EVERY MORNING
Qty: 90 TABLET | Refills: 0 | Status: SHIPPED | OUTPATIENT
Start: 2022-04-21 | End: 2022-06-27 | Stop reason: SDUPTHER

## 2022-04-21 RX ORDER — CLONAZEPAM 0.5 MG/1
0.5 TABLET ORAL 2 TIMES DAILY
Qty: 60 TABLET | Refills: 0 | Status: SHIPPED | OUTPATIENT
Start: 2022-04-21 | End: 2022-04-21 | Stop reason: SDUPTHER

## 2022-04-21 RX ORDER — CLONAZEPAM 0.5 MG/1
0.5 TABLET ORAL 2 TIMES DAILY
Qty: 60 TABLET | Refills: 0 | Status: SHIPPED | OUTPATIENT
Start: 2022-04-21 | End: 2022-05-27

## 2022-04-21 RX ORDER — BUPROPION HYDROCHLORIDE 300 MG/1
300 TABLET ORAL EVERY MORNING
Qty: 90 TABLET | Refills: 0 | Status: SHIPPED | OUTPATIENT
Start: 2022-04-21 | End: 2022-04-21 | Stop reason: SDUPTHER

## 2022-04-21 RX ORDER — FLUVOXAMINE MALEATE 100 MG
TABLET ORAL
Qty: 270 TABLET | Refills: 0 | Status: SHIPPED | OUTPATIENT
Start: 2022-04-21 | End: 2022-04-21 | Stop reason: SDUPTHER

## 2022-04-21 RX ORDER — PRAZOSIN HYDROCHLORIDE 2 MG/1
4 CAPSULE ORAL NIGHTLY
Qty: 180 CAPSULE | Refills: 0 | Status: SHIPPED | OUTPATIENT
Start: 2022-04-21 | End: 2022-06-27 | Stop reason: SDUPTHER

## 2022-04-21 RX ORDER — BUSPIRONE HYDROCHLORIDE 30 MG/1
30 TABLET ORAL 2 TIMES DAILY
Qty: 180 TABLET | Refills: 0 | Status: SHIPPED | OUTPATIENT
Start: 2022-04-21 | End: 2022-06-27 | Stop reason: SDUPTHER

## 2022-04-21 RX ORDER — BUSPIRONE HYDROCHLORIDE 30 MG/1
30 TABLET ORAL 2 TIMES DAILY
Qty: 180 TABLET | Refills: 0 | Status: SHIPPED | OUTPATIENT
Start: 2022-04-21 | End: 2022-04-21 | Stop reason: SDUPTHER

## 2022-04-21 RX ORDER — FLUVOXAMINE MALEATE 100 MG
TABLET ORAL
Qty: 270 TABLET | Refills: 0 | Status: SHIPPED | OUTPATIENT
Start: 2022-04-21 | End: 2022-06-27 | Stop reason: SDUPTHER

## 2022-04-21 RX ORDER — PRAZOSIN HYDROCHLORIDE 2 MG/1
4 CAPSULE ORAL NIGHTLY
Qty: 180 CAPSULE | Refills: 0 | Status: SHIPPED | OUTPATIENT
Start: 2022-04-21 | End: 2022-04-21 | Stop reason: SDUPTHER

## 2022-04-21 NOTE — TELEPHONE ENCOUNTER
Patient was seen today but his prescriptions got sent to the wrong pharmacy I have updated his pharmacy with the updated one please resend thank you    Received request via: Patient    Was the patient seen in the last year in this department? Yes    Does the patient have an active prescription (recently filled or refills available) for medication(s) requested? No

## 2022-04-29 ENCOUNTER — HOSPITAL ENCOUNTER (OUTPATIENT)
Facility: MEDICAL CENTER | Age: 52
End: 2022-04-29
Attending: NURSE PRACTITIONER
Payer: COMMERCIAL

## 2022-04-29 PROCEDURE — 85025 COMPLETE CBC W/AUTO DIFF WBC: CPT

## 2022-04-29 PROCEDURE — 87340 HEPATITIS B SURFACE AG IA: CPT

## 2022-04-29 PROCEDURE — 84443 ASSAY THYROID STIM HORMONE: CPT

## 2022-04-29 PROCEDURE — 80053 COMPREHEN METABOLIC PANEL: CPT

## 2022-04-29 PROCEDURE — 86704 HEP B CORE ANTIBODY TOTAL: CPT

## 2022-04-30 LAB
ALBUMIN SERPL BCP-MCNC: 5 G/DL (ref 3.2–4.9)
ALBUMIN/GLOB SERPL: 1.9 G/DL
ALP SERPL-CCNC: 64 U/L (ref 30–99)
ALT SERPL-CCNC: 42 U/L (ref 2–50)
ANION GAP SERPL CALC-SCNC: 14 MMOL/L (ref 7–16)
AST SERPL-CCNC: 36 U/L (ref 12–45)
BASOPHILS # BLD AUTO: 0.3 % (ref 0–1.8)
BASOPHILS # BLD: 0.02 K/UL (ref 0–0.12)
BILIRUB SERPL-MCNC: 0.2 MG/DL (ref 0.1–1.5)
BUN SERPL-MCNC: 26 MG/DL (ref 8–22)
CALCIUM SERPL-MCNC: 9.7 MG/DL (ref 8.5–10.5)
CHLORIDE SERPL-SCNC: 102 MMOL/L (ref 96–112)
CO2 SERPL-SCNC: 23 MMOL/L (ref 20–33)
CREAT SERPL-MCNC: 1.11 MG/DL (ref 0.5–1.4)
EOSINOPHIL # BLD AUTO: 0.05 K/UL (ref 0–0.51)
EOSINOPHIL NFR BLD: 0.8 % (ref 0–6.9)
ERYTHROCYTE [DISTWIDTH] IN BLOOD BY AUTOMATED COUNT: 45.6 FL (ref 35.9–50)
GFR SERPLBLD CREATININE-BSD FMLA CKD-EPI: 79 ML/MIN/1.73 M 2
GLOBULIN SER CALC-MCNC: 2.6 G/DL (ref 1.9–3.5)
GLUCOSE SERPL-MCNC: 114 MG/DL (ref 65–99)
HBV CORE AB SERPL QL IA: NONREACTIVE
HBV SURFACE AG SER QL: NORMAL
HCT VFR BLD AUTO: 44.8 % (ref 42–52)
HGB BLD-MCNC: 14.3 G/DL (ref 14–18)
IMM GRANULOCYTES # BLD AUTO: 0.02 K/UL (ref 0–0.11)
IMM GRANULOCYTES NFR BLD AUTO: 0.3 % (ref 0–0.9)
LYMPHOCYTES # BLD AUTO: 1.44 K/UL (ref 1–4.8)
LYMPHOCYTES NFR BLD: 22.6 % (ref 22–41)
MCH RBC QN AUTO: 27.7 PG (ref 27–33)
MCHC RBC AUTO-ENTMCNC: 31.9 G/DL (ref 33.7–35.3)
MCV RBC AUTO: 86.7 FL (ref 81.4–97.8)
MONOCYTES # BLD AUTO: 0.61 K/UL (ref 0–0.85)
MONOCYTES NFR BLD AUTO: 9.6 % (ref 0–13.4)
NEUTROPHILS # BLD AUTO: 4.23 K/UL (ref 1.82–7.42)
NEUTROPHILS NFR BLD: 66.4 % (ref 44–72)
NRBC # BLD AUTO: 0 K/UL
NRBC BLD-RTO: 0 /100 WBC
PLATELET # BLD AUTO: 317 K/UL (ref 164–446)
PMV BLD AUTO: 10.4 FL (ref 9–12.9)
POTASSIUM SERPL-SCNC: 3.7 MMOL/L (ref 3.6–5.5)
PROT SERPL-MCNC: 7.6 G/DL (ref 6–8.2)
RBC # BLD AUTO: 5.17 M/UL (ref 4.7–6.1)
SODIUM SERPL-SCNC: 139 MMOL/L (ref 135–145)
TSH SERPL DL<=0.005 MIU/L-ACNC: 5.07 UIU/ML (ref 0.38–5.33)
WBC # BLD AUTO: 6.4 K/UL (ref 4.8–10.8)

## 2022-05-17 ENCOUNTER — HOSPITAL ENCOUNTER (OUTPATIENT)
Facility: MEDICAL CENTER | Age: 52
End: 2022-05-17
Attending: INTERNAL MEDICINE | Admitting: STUDENT IN AN ORGANIZED HEALTH CARE EDUCATION/TRAINING PROGRAM
Payer: COMMERCIAL

## 2022-05-17 ENCOUNTER — APPOINTMENT (OUTPATIENT)
Dept: RADIOLOGY | Facility: MEDICAL CENTER | Age: 52
End: 2022-05-17
Attending: INTERNAL MEDICINE
Payer: COMMERCIAL

## 2022-05-17 VITALS
BODY MASS INDEX: 27.55 KG/M2 | RESPIRATION RATE: 16 BRPM | HEART RATE: 75 BPM | DIASTOLIC BLOOD PRESSURE: 66 MMHG | HEIGHT: 69 IN | OXYGEN SATURATION: 98 % | WEIGHT: 186 LBS | SYSTOLIC BLOOD PRESSURE: 127 MMHG

## 2022-05-17 DIAGNOSIS — C34.02 MALIGNANT NEOPLASM OF LEFT MAIN BRONCHUS (HCC): ICD-10-CM

## 2022-05-17 PROCEDURE — 700111 HCHG RX REV CODE 636 W/ 250 OVERRIDE (IP)

## 2022-05-17 PROCEDURE — 77001 FLUOROGUIDE FOR VEIN DEVICE: CPT

## 2022-05-17 PROCEDURE — 700101 HCHG RX REV CODE 250

## 2022-05-17 PROCEDURE — 76937 US GUIDE VASCULAR ACCESS: CPT

## 2022-05-17 PROCEDURE — 99153 MOD SED SAME PHYS/QHP EA: CPT

## 2022-05-17 PROCEDURE — 700111 HCHG RX REV CODE 636 W/ 250 OVERRIDE (IP): Performed by: RADIOLOGY

## 2022-05-17 RX ORDER — MIDAZOLAM HYDROCHLORIDE 1 MG/ML
.5-2 INJECTION INTRAMUSCULAR; INTRAVENOUS PRN
Status: DISCONTINUED | OUTPATIENT
Start: 2022-05-17 | End: 2022-05-17 | Stop reason: HOSPADM

## 2022-05-17 RX ORDER — CEFAZOLIN SODIUM 2 G/100ML
2 INJECTION, SOLUTION INTRAVENOUS ONCE
Status: DISCONTINUED | OUTPATIENT
Start: 2022-05-17 | End: 2022-05-17 | Stop reason: HOSPADM

## 2022-05-17 RX ORDER — SODIUM CHLORIDE 9 MG/ML
500 INJECTION, SOLUTION INTRAVENOUS
Status: DISCONTINUED | OUTPATIENT
Start: 2022-05-17 | End: 2022-05-17 | Stop reason: HOSPADM

## 2022-05-17 RX ORDER — ONDANSETRON 2 MG/ML
4 INJECTION INTRAMUSCULAR; INTRAVENOUS EVERY 6 HOURS PRN
Status: DISCONTINUED | OUTPATIENT
Start: 2022-05-17 | End: 2022-05-17 | Stop reason: HOSPADM

## 2022-05-17 RX ORDER — ONDANSETRON 8 MG/1
8 TABLET, ORALLY DISINTEGRATING ORAL EVERY 8 HOURS PRN
COMMUNITY

## 2022-05-17 RX ORDER — MIDAZOLAM HYDROCHLORIDE 1 MG/ML
INJECTION INTRAMUSCULAR; INTRAVENOUS
Status: COMPLETED
Start: 2022-05-17 | End: 2022-05-17

## 2022-05-17 RX ORDER — FOLIC ACID 1 MG/1
1 TABLET ORAL DAILY
COMMUNITY

## 2022-05-17 RX ORDER — ONDANSETRON 2 MG/ML
INJECTION INTRAMUSCULAR; INTRAVENOUS
Status: COMPLETED
Start: 2022-05-17 | End: 2022-05-17

## 2022-05-17 RX ORDER — LIDOCAINE HYDROCHLORIDE AND EPINEPHRINE BITARTRATE 20; .01 MG/ML; MG/ML
INJECTION, SOLUTION SUBCUTANEOUS
Status: COMPLETED
Start: 2022-05-17 | End: 2022-05-17

## 2022-05-17 RX ORDER — HEPARIN SODIUM (PORCINE) LOCK FLUSH IV SOLN 100 UNIT/ML 100 UNIT/ML
SOLUTION INTRAVENOUS
Status: COMPLETED
Start: 2022-05-17 | End: 2022-05-17

## 2022-05-17 RX ORDER — CEFAZOLIN SODIUM 1 G/3ML
INJECTION, POWDER, FOR SOLUTION INTRAMUSCULAR; INTRAVENOUS
Status: COMPLETED
Start: 2022-05-17 | End: 2022-05-17

## 2022-05-17 RX ORDER — LIDOCAINE HYDROCHLORIDE 20 MG/ML
INJECTION, SOLUTION INFILTRATION; PERINEURAL
Status: DISCONTINUED
Start: 2022-05-17 | End: 2022-05-17 | Stop reason: HOSPADM

## 2022-05-17 RX ADMIN — FENTANYL CITRATE 50 MCG: 50 INJECTION, SOLUTION INTRAMUSCULAR; INTRAVENOUS at 10:23

## 2022-05-17 RX ADMIN — FENTANYL CITRATE 50 MCG: 50 INJECTION, SOLUTION INTRAMUSCULAR; INTRAVENOUS at 10:04

## 2022-05-17 RX ADMIN — FENTANYL CITRATE 50 MCG: 50 INJECTION, SOLUTION INTRAMUSCULAR; INTRAVENOUS at 10:10

## 2022-05-17 RX ADMIN — FENTANYL CITRATE 50 MCG: 50 INJECTION, SOLUTION INTRAMUSCULAR; INTRAVENOUS at 10:18

## 2022-05-17 RX ADMIN — ONDANSETRON 4 MG: 2 INJECTION INTRAMUSCULAR; INTRAVENOUS at 09:34

## 2022-05-17 RX ADMIN — LIDOCAINE HYDROCHLORIDE AND EPINEPHRINE 10 ML: 20; 10 INJECTION, SOLUTION INFILTRATION; PERINEURAL at 10:05

## 2022-05-17 RX ADMIN — MIDAZOLAM HYDROCHLORIDE 1 MG: 1 INJECTION, SOLUTION INTRAMUSCULAR; INTRAVENOUS at 10:04

## 2022-05-17 RX ADMIN — HEPARIN 5 ML: 100 SYRINGE at 10:19

## 2022-05-17 RX ADMIN — MIDAZOLAM HYDROCHLORIDE 1 MG: 1 INJECTION, SOLUTION INTRAMUSCULAR; INTRAVENOUS at 10:18

## 2022-05-17 RX ADMIN — CEFAZOLIN 2000 MG: 330 INJECTION, POWDER, FOR SOLUTION INTRAMUSCULAR; INTRAVENOUS at 10:10

## 2022-05-17 RX ADMIN — MIDAZOLAM HYDROCHLORIDE 1 MG: 1 INJECTION, SOLUTION INTRAMUSCULAR; INTRAVENOUS at 10:23

## 2022-05-17 RX ADMIN — MIDAZOLAM HYDROCHLORIDE 1 MG: 1 INJECTION, SOLUTION INTRAMUSCULAR; INTRAVENOUS at 10:10

## 2022-05-17 RX ADMIN — MIDAZOLAM 1 MG: 1 INJECTION, SOLUTION INTRAMUSCULAR; INTRAVENOUS at 10:04

## 2022-05-17 ASSESSMENT — FIBROSIS 4 INDEX: FIB4 SCORE: 0.91

## 2022-05-17 NOTE — CONSULTS
Patient underwent a powerport placement with moderate sedation by Dr. López. Procedure site was marked by MD and verified using imaging guidance. Patient was placed in a supine position. Patient received Ancef 2g per Dr. López prior to procedure. Vitals were taken every 5 minutes and remained stable during procedure (see doc flow sheet for results). CO2 waveform capnography was monitored and remained stable throughout procedure. A gauze and tegaderm  dressing was placed over surgical site. Patient to [facility] recovery area.

## 2022-05-17 NOTE — OR SURGEON
Immediate Post- Operative Note        Findings: lung cancer      Procedure(s): chest port      Estimated Blood Loss: Less than 5 ml        Complications: None            5/17/2022     10:31 AM     Mayank López M.D.

## 2022-05-17 NOTE — PROGRESS NOTES
Provided patient discharge instructions and copies of discharge instructions. All questions answered and patient agreed to understanding. IV discontinued and removed. Patient escorted to lobby by RN and patient son present.

## 2022-05-27 DIAGNOSIS — F43.10 POSTTRAUMATIC STRESS DISORDER: ICD-10-CM

## 2022-05-27 RX ORDER — CLONAZEPAM 0.5 MG/1
TABLET ORAL
Qty: 60 TABLET | Refills: 0 | Status: SHIPPED | OUTPATIENT
Start: 2022-05-27 | End: 2022-06-26

## 2022-06-26 NOTE — PROGRESS NOTES
"                                  [facility] FOLLOW-UP NOTE    This provider informed the patient their medical records are totally confidential except for the use by other providers involved in their care, or if the patient signs a release, or to report instances of child or elder abuse, or if it is determined they are an immediate risk to harm themselves or others.  In order to avoid spread of covid-19 virus this appointment was conducted by telehealth.  The patient gave consent to have this follow up session by video telehealth.  The visit was in the home setting.    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  Having depressed mood, panic attacks, and traumatic recollections.  HISTORY OF PRESENT ILLNESS       The patient is a  51yo W male who has been disabled by 2 strokes in 2016 and 2018 and he lives with his 3rd wife and off of her income.  He is followed by this provider for recurrent Major Depression, Panic Disorder with Agoraphobia, and Posttraumatic Stress Disorder.  He has had depressive episodes since he had a cerebrovascular accident in Aug 2016.  He had (L) sided weakness and his depression had a typical pattern of anhedonia, decreased sleep and appetite, feeling worthless and hopeless, and having recurrent passive suicidal ideation that \"he'd be better off dead\".  He denies ever having an overt suicidal plan, intent, or attempt.       He has had Panic Disorder with Agoraphobia since his strokes and his panic attacks are triggered when he goes out into public.  He has some symptoms of PTSD with no clear Criterion A but \"he grew up in the streets\" and he saw somebody kill themselves, intrusive trauma recollections and dreams, avoidance and hypervigilance.  His anxiety has been elevated because of the corona virus pandemic and he has isolated himself at " home except for driving his 23yo son to work.       He smokes cannabis every night.  He has some neurocognitive impairment since his strokes and has difficulty with dysarthria, word finding, recent memory and weakness in his (L) hand.       He has been treated with radiation seeds for his prostate cancer.         With his recent lung cancer diagnosis, his anxiety and panic attacks have increased and both he and his wife are depressed.  Discussed with the patient, he shouldn't jump the gun, his lung cancer is stage 4, and treatment options need to be defined.  He decided to do chemotherapy and aminotherapy.  PSYCHOSOCIAL CHANGES SINCE PREVIOUS CONTACT       He has 2 grandbabies and his son and daughter-in-law living in his home and this gives him something positive every day.  RESPONSE TO TREATMENT       He was doing pretty well on fluvoamine 100mg po BID but now has bouts of serious depression.  He will increase his fluvoxamine to 100mg po TID.  PAST PSYCHIATRIC MEDICATIONS       Fluoxetine, buspirone, clonazepam, prazosin, bupropion XL, fluvoxamine.  MEDICATION SIDE EFFECTS       None    MEDICAL REVIEW OF SYSTEMS:   Constitutional positive - obesity   Eyes negative   Ears/Nose/Mouth/Throat negative   Cardiovascular positive - hyprtension   Respiratory positive - COPD, MARIA ISABEL with CPAP, adenocarcinoma of OSIEL   Gastrointestinal positive - GERD, fatty liver,  diverticulitis   Genitourinary negative   Muscular negative   Integumentary negative   Neurological positive - CVA in 2016 and 2018   Endocrine positive - hyperlipidemia   Hematologic/Lymphatic positive - iron deficiency anemia       MENTAL STATUS EVALUATION  There were no vitals taken for this visit.  Participation: Active verbal participation  Grooming:Casual  Orientation: Fully Oriented  Eye contact: Good  Behavior:Calm   Mood: Depressed and Anxious  Affect: Constricted  Thought process: Logical  Thought content:  Within normal limits  Speech: Rate within normal  limits and Volume within normal limits  Perception:  Within normal limits  Memory:  No gross evidence of memory deficits  Insight: Good  Judgment: Good  Family/couple interaction observations:   Other:  NARA-7 Questionnaire    Feeling nervous, anxious, or on edge:  3  Not being able to stop or control worrying:  3  Worrying too much about different things:  3  Trouble relaxing:  3  Being so restless that it's hard to sit still:  2  Becoming easily annoyed or irritable: 3   Feeling afraid as if something awful might happen:  3  Total:  20    Interpretation of NARA 7 Total Score   Score Severity :  0-4 No Anxiety   5-9 Mild Anxiety  10-14 Moderate Anxiety  15-21 Severe Anxiety    Depression Screening    Little interest or pleasure in doing things?    3  Feeling down, depressed , or hopeless?   3  Trouble falling or staying asleep, or sleeping too much?    3  Feeling tired or having little energy?    3  Poor appetite or overeating?    3  Feeling bad about yourself - or that you are a failure or have let yourself or your family down?   2  Trouble concentrating on things, such as reading the newspaper or watching television?   3  Moving or speaking so slowly that other people could have noticed.  Or the opposite - being so fidgety or restless that you have been moving around a lot more than usual?    0  Thoughts that you would be better off dead, or of hurting yourself?    1  Patient Health Questionnaire Score:   21      If depressive symptoms identified deferred to follow up visit unless specifically addressed in assesment and plan.    Interpretation of PHQ-9 Total Score   Score Severity   1-4 No Depression   5-9 Mild Depression   10-14 Moderate Depression   15-19 Moderately Severe Depression   20-27 Severe Depression    Current risk:    Suicide: Low   Homicide: Not applicable   Self-harm: Low  Relapse: Moderate  Other:   Crisis Safety Plan reviewed?No  If evidence of imminent risk is present,  intervention/plan:    Medical Records/Labs/Diagnostic Tests Reviewed: yes    Medical Records/Labs/Diagnostic Tests Ordered: no    DIAGNOSTIC IMPRESSIONS  MEDICAL REVIEW OF SYSTEMS:   Constitutional positive - obesity   Eyes negative   Ears/Nose/Mouth/Throat negative   Cardiovascular positive - hyprtension   Respiratory positive - COPD, MARIA ISABEL with CPAP, adenocarcinoma of OSIEL   Gastrointestinal positive - GERD, fatty liver,  diverticulitis   Genitourinary negative   Muscular negative   Integumentary negative   Neurological positive - CVA in 2016 and 2018   Endocrine positive - hyperlipidemia   Hematologic/Lymphatic positive - iron deficiency anemia     MENTAL STATUS EVALUATION  There were no vitals taken for this visit.  Participation: Active verbal participation  Grooming:Neat  Orientation: Fully Oriented  Eye contact: Good  Behavior:Calm   Mood: Depressed and Anxious  Affect: Full range  Thought process: Logical  Thought content:  Within normal limits  Speech: Rate within normal limits and Volume within normal limits  Perception:  Within normal limits  Memory:  No gross evidence of memory deficits  Insight: Good  Judgment: Good  Family/couple interaction observations:   Other:    Current risk:               Suicide: Low              Homicide: Not applicable              Self-harm: Low  Relapse: Moderate  Other:   Crisis Safety Plan reviewed?No  If evidence of imminent risk is present, intervention/plan:     Medical Records/Labs/Diagnostic Tests Reviewed: yes     Medical Records/Labs/Diagnostic Tests Ordered: no     DIAGNOSTIC IMPRESSIONS       (1) Major Depression, Recurrent, Moderate (F33.1)       (2) Panic Disorder with Agoraphobia (F40.01)       (3) Postraumatic Stress Disorder (F43.10)       (4) Cannabis Dependence (F12.20)    ASSESSMENT AND PLAN       Stable on current medications and dosages.  Current risk:               Suicide: Low              Homicide: Not applicable              Self-harm: Low  Relapse:  Moderate  Other:   Crisis Safety Plan reviewed?No  If evidence of imminent risk is present, intervention/plan:     Medical Records/Labs/Diagnostic Tests Reviewed: yes     Medical Records/Labs/Diagnostic Tests Ordered: no     DIAGNOSTIC IMPRESSIONS       (1) Major Depression, Recurrent, Moderate (F33.1)       (2) Panic Disorder with Agoraphobia (F40.01)       (3) Postraumatic Stress Disorder (F43.10)       (4) Cannabis Dependence (F12.20)    ASSESSMENT AND PLAN       Stable on current medications and dosages.       Continue bupropion XL 300mg po QAM.       Continue fluvoxamine dosage to 100mg po TID.       Continue buspirone 30mg po BID.       Continue prazosin 4mg po QHS.       Continue clonazepam 0.5mg po BID as needed for panic symptoms.       RTC to  Clinic in 3 months for 30 min follow up with this provider    Time at end of call:  9:30 AM    Greater than 16 minutes spent in psychotherapy exclusive of my E&M.  Topics addressed in psychotherapy include:  He has already been a prostate cancer survivor.  He has to stay strong in spite of anxiety since father  of lung cancer.  His wife's depression has been exacerbated because of both of their cancer treatments.

## 2022-06-27 ENCOUNTER — TELEMEDICINE (OUTPATIENT)
Dept: BEHAVIORAL HEALTH | Facility: CLINIC | Age: 52
End: 2022-06-27
Payer: COMMERCIAL

## 2022-06-27 ENCOUNTER — PATIENT MESSAGE (OUTPATIENT)
Dept: BEHAVIORAL HEALTH | Facility: CLINIC | Age: 52
End: 2022-06-27
Payer: COMMERCIAL

## 2022-06-27 DIAGNOSIS — F33.0 MILD EPISODE OF RECURRENT MAJOR DEPRESSIVE DISORDER (HCC): ICD-10-CM

## 2022-06-27 DIAGNOSIS — F40.01 PANIC DISORDER WITH AGORAPHOBIA AND MILD PANIC ATTACKS: ICD-10-CM

## 2022-06-27 DIAGNOSIS — F43.10 POSTTRAUMATIC STRESS DISORDER: ICD-10-CM

## 2022-06-27 DIAGNOSIS — C34.92 ADENOCARCINOMA, LUNG, LEFT (HCC): ICD-10-CM

## 2022-06-27 DIAGNOSIS — F33.1 MODERATE RECURRENT MAJOR DEPRESSION (HCC): ICD-10-CM

## 2022-06-27 DIAGNOSIS — F51.5 NIGHTMARE DISORDER: ICD-10-CM

## 2022-06-27 PROCEDURE — 99213 OFFICE O/P EST LOW 20 MIN: CPT | Mod: 95 | Performed by: PSYCHIATRY & NEUROLOGY

## 2022-06-27 PROCEDURE — 90833 PSYTX W PT W E/M 30 MIN: CPT | Mod: 95 | Performed by: PSYCHIATRY & NEUROLOGY

## 2022-06-27 RX ORDER — PRAZOSIN HYDROCHLORIDE 2 MG/1
4 CAPSULE ORAL NIGHTLY
Qty: 180 CAPSULE | Refills: 0 | Status: SHIPPED | OUTPATIENT
Start: 2022-06-27 | End: 2022-09-25

## 2022-06-27 RX ORDER — BUPROPION HYDROCHLORIDE 300 MG/1
300 TABLET ORAL EVERY MORNING
Qty: 90 TABLET | Refills: 0 | Status: SHIPPED | OUTPATIENT
Start: 2022-06-27 | End: 2022-09-25

## 2022-06-27 RX ORDER — FLUVOXAMINE MALEATE 100 MG
100 TABLET ORAL 3 TIMES DAILY
Qty: 270 TABLET | Refills: 0 | Status: SHIPPED | OUTPATIENT
Start: 2022-06-27 | End: 2022-09-25

## 2022-06-27 RX ORDER — CLONAZEPAM 0.5 MG/1
0.5 TABLET ORAL 2 TIMES DAILY
Qty: 60 TABLET | Refills: 2 | Status: SHIPPED | OUTPATIENT
Start: 2022-06-27 | End: 2022-07-27

## 2022-06-27 RX ORDER — CLONAZEPAM 0.5 MG/1
0.5 TABLET ORAL 2 TIMES DAILY
Qty: 60 TABLET | Refills: 2 | Status: SHIPPED | OUTPATIENT
Start: 2022-06-27 | End: 2022-06-27

## 2022-06-27 RX ORDER — BUSPIRONE HYDROCHLORIDE 30 MG/1
30 TABLET ORAL 2 TIMES DAILY
Qty: 180 TABLET | Refills: 0 | Status: SHIPPED | OUTPATIENT
Start: 2022-06-27 | End: 2022-09-25

## 2022-08-01 ENCOUNTER — HOSPITAL ENCOUNTER (OUTPATIENT)
Facility: MEDICAL CENTER | Age: 52
End: 2022-08-01
Attending: INTERNAL MEDICINE
Payer: COMMERCIAL

## 2022-08-01 PROCEDURE — 84153 ASSAY OF PSA TOTAL: CPT

## 2022-08-02 LAB — PSA SERPL-MCNC: 1.31 NG/ML (ref 0–4)

## 2022-08-19 ENCOUNTER — HOSPITAL ENCOUNTER (OUTPATIENT)
Facility: MEDICAL CENTER | Age: 52
End: 2022-08-19
Attending: INTERNAL MEDICINE
Payer: COMMERCIAL

## 2022-08-19 PROCEDURE — 80053 COMPREHEN METABOLIC PANEL: CPT

## 2022-08-19 PROCEDURE — 84443 ASSAY THYROID STIM HORMONE: CPT

## 2022-08-20 LAB
ALBUMIN SERPL BCP-MCNC: 4.6 G/DL (ref 3.2–4.9)
ALBUMIN/GLOB SERPL: 1.8 G/DL
ALP SERPL-CCNC: 57 U/L (ref 30–99)
ALT SERPL-CCNC: 111 U/L (ref 2–50)
ANION GAP SERPL CALC-SCNC: 14 MMOL/L (ref 7–16)
AST SERPL-CCNC: 98 U/L (ref 12–45)
BILIRUB SERPL-MCNC: 0.3 MG/DL (ref 0.1–1.5)
BUN SERPL-MCNC: 12 MG/DL (ref 8–22)
CALCIUM SERPL-MCNC: 9.5 MG/DL (ref 8.5–10.5)
CHLORIDE SERPL-SCNC: 102 MMOL/L (ref 96–112)
CO2 SERPL-SCNC: 23 MMOL/L (ref 20–33)
CREAT SERPL-MCNC: 1.08 MG/DL (ref 0.5–1.4)
GFR SERPLBLD CREATININE-BSD FMLA CKD-EPI: 82 ML/MIN/1.73 M 2
GLOBULIN SER CALC-MCNC: 2.6 G/DL (ref 1.9–3.5)
GLUCOSE SERPL-MCNC: 127 MG/DL (ref 65–99)
POTASSIUM SERPL-SCNC: 3.9 MMOL/L (ref 3.6–5.5)
PROT SERPL-MCNC: 7.2 G/DL (ref 6–8.2)
SODIUM SERPL-SCNC: 139 MMOL/L (ref 135–145)
TSH SERPL DL<=0.005 MIU/L-ACNC: 2.08 UIU/ML (ref 0.38–5.33)

## 2022-09-02 ENCOUNTER — HOSPITAL ENCOUNTER (EMERGENCY)
Facility: MEDICAL CENTER | Age: 52
End: 2022-09-02
Attending: EMERGENCY MEDICINE
Payer: COMMERCIAL

## 2022-09-02 ENCOUNTER — APPOINTMENT (OUTPATIENT)
Dept: RADIOLOGY | Facility: MEDICAL CENTER | Age: 52
End: 2022-09-02
Attending: EMERGENCY MEDICINE
Payer: COMMERCIAL

## 2022-09-02 VITALS
HEIGHT: 69 IN | RESPIRATION RATE: 17 BRPM | HEART RATE: 72 BPM | DIASTOLIC BLOOD PRESSURE: 65 MMHG | OXYGEN SATURATION: 97 % | BODY MASS INDEX: 28.28 KG/M2 | TEMPERATURE: 97.4 F | WEIGHT: 190.92 LBS | SYSTOLIC BLOOD PRESSURE: 126 MMHG

## 2022-09-02 DIAGNOSIS — R55 SYNCOPE, UNSPECIFIED SYNCOPE TYPE: ICD-10-CM

## 2022-09-02 LAB
ALBUMIN SERPL BCP-MCNC: 4.2 G/DL (ref 3.2–4.9)
ALBUMIN/GLOB SERPL: 1.3 G/DL
ALP SERPL-CCNC: 55 U/L (ref 30–99)
ALT SERPL-CCNC: 35 U/L (ref 2–50)
ANION GAP SERPL CALC-SCNC: 12 MMOL/L (ref 7–16)
APPEARANCE UR: CLEAR
AST SERPL-CCNC: 38 U/L (ref 12–45)
BASOPHILS # BLD AUTO: 0 % (ref 0–1.8)
BASOPHILS # BLD: 0 K/UL (ref 0–0.12)
BILIRUB SERPL-MCNC: 0.3 MG/DL (ref 0.1–1.5)
BILIRUB UR QL STRIP.AUTO: NEGATIVE
BUN SERPL-MCNC: 11 MG/DL (ref 8–22)
CALCIUM SERPL-MCNC: 9.6 MG/DL (ref 8.5–10.5)
CHLORIDE SERPL-SCNC: 97 MMOL/L (ref 96–112)
CO2 SERPL-SCNC: 23 MMOL/L (ref 20–33)
COLOR UR: YELLOW
CREAT SERPL-MCNC: 1.07 MG/DL (ref 0.5–1.4)
EKG IMPRESSION: NORMAL
EOSINOPHIL # BLD AUTO: 0.01 K/UL (ref 0–0.51)
EOSINOPHIL NFR BLD: 0.4 % (ref 0–6.9)
ERYTHROCYTE [DISTWIDTH] IN BLOOD BY AUTOMATED COUNT: 54.3 FL (ref 35.9–50)
GFR SERPLBLD CREATININE-BSD FMLA CKD-EPI: 83 ML/MIN/1.73 M 2
GLOBULIN SER CALC-MCNC: 3.2 G/DL (ref 1.9–3.5)
GLUCOSE SERPL-MCNC: 106 MG/DL (ref 65–99)
GLUCOSE UR STRIP.AUTO-MCNC: NEGATIVE MG/DL
HCT VFR BLD AUTO: 31.3 % (ref 42–52)
HGB BLD-MCNC: 11.2 G/DL (ref 14–18)
IMM GRANULOCYTES # BLD AUTO: 0.01 K/UL (ref 0–0.11)
IMM GRANULOCYTES NFR BLD AUTO: 0.4 % (ref 0–0.9)
KETONES UR STRIP.AUTO-MCNC: NEGATIVE MG/DL
LEUKOCYTE ESTERASE UR QL STRIP.AUTO: NEGATIVE
LYMPHOCYTES # BLD AUTO: 0.78 K/UL (ref 1–4.8)
LYMPHOCYTES NFR BLD: 33.8 % (ref 22–41)
MCH RBC QN AUTO: 33.2 PG (ref 27–33)
MCHC RBC AUTO-ENTMCNC: 35.8 G/DL (ref 33.7–35.3)
MCV RBC AUTO: 92.9 FL (ref 81.4–97.8)
MICRO URNS: NORMAL
MONOCYTES # BLD AUTO: 0.89 K/UL (ref 0–0.85)
MONOCYTES NFR BLD AUTO: 38.5 % (ref 0–13.4)
NEUTROPHILS # BLD AUTO: 0.62 K/UL (ref 1.82–7.42)
NEUTROPHILS NFR BLD: 26.9 % (ref 44–72)
NITRITE UR QL STRIP.AUTO: NEGATIVE
NRBC # BLD AUTO: 0 K/UL
NRBC BLD-RTO: 0 /100 WBC
PH UR STRIP.AUTO: 6 [PH] (ref 5–8)
PLATELET # BLD AUTO: 82 K/UL (ref 164–446)
PMV BLD AUTO: 10.5 FL (ref 9–12.9)
POTASSIUM SERPL-SCNC: 3.8 MMOL/L (ref 3.6–5.5)
PROT SERPL-MCNC: 7.4 G/DL (ref 6–8.2)
PROT UR QL STRIP: NEGATIVE MG/DL
RBC # BLD AUTO: 3.37 M/UL (ref 4.7–6.1)
RBC UR QL AUTO: NEGATIVE
SODIUM SERPL-SCNC: 132 MMOL/L (ref 135–145)
SP GR UR STRIP.AUTO: 1
UROBILINOGEN UR STRIP.AUTO-MCNC: 0.2 MG/DL
WBC # BLD AUTO: 2.3 K/UL (ref 4.8–10.8)

## 2022-09-02 PROCEDURE — 93005 ELECTROCARDIOGRAM TRACING: CPT | Performed by: EMERGENCY MEDICINE

## 2022-09-02 PROCEDURE — 36415 COLL VENOUS BLD VENIPUNCTURE: CPT

## 2022-09-02 PROCEDURE — 70450 CT HEAD/BRAIN W/O DYE: CPT

## 2022-09-02 PROCEDURE — 93005 ELECTROCARDIOGRAM TRACING: CPT

## 2022-09-02 PROCEDURE — 99284 EMERGENCY DEPT VISIT MOD MDM: CPT

## 2022-09-02 PROCEDURE — 81003 URINALYSIS AUTO W/O SCOPE: CPT

## 2022-09-02 PROCEDURE — 80053 COMPREHEN METABOLIC PANEL: CPT

## 2022-09-02 PROCEDURE — 85025 COMPLETE CBC W/AUTO DIFF WBC: CPT

## 2022-09-02 ASSESSMENT — FIBROSIS 4 INDEX: FIB4 SCORE: 1.56

## 2022-09-02 NOTE — ED NOTES
Pt up to the BR with steady gait.  Call to CT for eta, pt has a couple in front of him.  Pt and spouse updated to POC.

## 2022-09-02 NOTE — ED TRIAGE NOTES
"Pt ambulated to triage with   Chief Complaint   Patient presents with    Seizure     Pt has stg 4 lung ca, yesterday having \"bad day\" pt was going to the bathroom and fell straight back, ?seizure, pt sat up and contracted and not coherent.  Pt was incont during episode.  Episode happened last night at 9pm.       Pt also reports that he had bleeding from right ear yesterday/last night.   Pt has port.  Protocol ordered.  Pt Informed regarding triage process and verbalized understanding to inform triage tech or RN for any changes in condition. Placed in family room.   "

## 2022-09-02 NOTE — ED PROVIDER NOTES
"ED Provider Note    CHIEF COMPLAINT  Chief Complaint   Patient presents with    Seizure     Pt has stg 4 lung ca, yesterday having \"bad day\" pt was going to the bathroom and fell straight back, ?seizure, pt sat up and contracted and not coherent.  Pt was incont during episode.  Episode happened last night at 9pm.         HPI  Luciano Silverman is a 52 y.o. male who presents following a syncope versus seizure event.  He states that he woke up to use the restroom.  He felt to the ground though does not have any recollection of this.  His wife ran to the room and found him on the ground.  No obvious signs of trauma.  He suddenly got up and screamed and bear down and then was stiff for what appeared to be 20 or 30 seconds.  He then gradually woke up back to normal.  The patient only remembers getting up to use the restroom.  He does not remember any prodrome or falling to the ground.  Denies any chest pain or trouble breathing.  No headache.  No numbness or weakness.    REVIEW OF SYSTEMS  See HPI for further details. All other systems are negative.     PAST MEDICAL HISTORY   has a past medical history of Adverse effect of anesthesia, Anxiety, Arthritis, Blood clotting disorder (HCC), Bowel habit changes, Breath shortness, Cancer ([facility]), Carcinoma in situ of respiratory system, COPD (chronic obstructive pulmonary disease) ([facility]), Dental disorder, Depression, Emphysema of lung (HCC), Heart burn, High cholesterol, Hyperlipidemia, Hypertension, Indigestion, Panic disorder with agoraphobia and mild panic attacks (2019), PTSD (post-traumatic stress disorder), Sleep apnea, Snoring, and Stroke ([facility]).    SOCIAL HISTORY  Social History     Tobacco Use    Smoking status: Former     Packs/day: 0.50     Years: 38.00     Pack years: 19.00     Types: Cigarettes     Quit date: 2021     Years since quittin.5    Smokeless tobacco: Former     Types: Chew    Tobacco comments:     down 5/cigarrettes a day or less   Vaping Use    " "Vaping Use: Never used   Substance and Sexual Activity    Alcohol use: Yes     Comment: socially - usually 1 beer twice a year    Drug use: Yes     Frequency: 7.0 times per week     Types: Marijuana, Inhaled     Comment: marijuana nightly to sleep    Sexual activity: Not on file       SURGICAL HISTORY   has a past surgical history that includes abdominal exploration; appendectomy; colonoscopy,diagnostic (N/A, 3/1/2022); and thoracoscopy,dx no bx (Left, 4/6/2022).    CURRENT MEDICATIONS  Home Medications       Reviewed by Mckenzie Blanco R.N. (Registered Nurse) on 09/02/22 at 0928  Med List Status: Partial     Medication Last Dose Status   albuterol 108 (90 Base) MCG/ACT Aero Soln inhalation aerosol  Active   asa/apap/caffeine (EXCEDRIN) 250-250-65 MG Tab  Active   atorvastatin (LIPITOR) 80 MG tablet  Active   buPROPion (WELLBUTRIN XL) 300 MG XL tablet  Active   busPIRone (BUSPAR) 30 MG tablet  Active   Cholecalciferol (D3 PO)  Active   Cyanocobalamin (B-12 PO)  Active   fenofibrate (TRICOR) 145 MG Tab  Active   fluvoxamine (LUVOX) 100 MG tablet  Active   folic acid (FOLVITE) 1 MG Tab  Active   lisinopril (PRINIVIL) 40 MG tablet  Active   loratadine (CLARITIN) 10 MG Tab  Active   multivitamin (THERAGRAN) Tab  Active   omeprazole (PRILOSEC) 20 MG delayed-release capsule  Active   ondansetron (ZOFRAN ODT) 8 MG TABLET DISPERSIBLE  Active   prazosin (MINIPRESS) 2 MG Cap  Active   tamsulosin (FLOMAX) 0.4 MG capsule  Active   urea (CARMOL) 10 % cream  Active                    ALLERGIES  No Known Allergies    PHYSICAL EXAM  VITAL SIGNS: /62   Pulse 81   Temp 36 °C (96.8 °F) (Tympanic)   Resp 15   Ht 1.753 m (5' 9\")   Wt 86.6 kg (190 lb 14.7 oz)   SpO2 97%   BMI 28.19 kg/m²   Pulse ox interpretation: I interpret this pulse ox as normal.  Constitutional: Alert in no apparent distress.  HENT: No signs of trauma, Bilateral external ears normal, Nose normal.   Eyes: Pupils are equal and reactive, Conjunctiva " normal, Non-icteric.   Neck: Normal range of motion, No tenderness, Supple, No stridor.   Lymphatic: No lymphadenopathy noted.   Cardiovascular: Regular rate and rhythm.   Thorax & Lungs: Normal breath sounds, No respiratory distress, No wheezing, No chest tenderness.   Abdomen: Bowel sounds normal, Soft, No tenderness, No masses, No pulsatile masses. No peritoneal signs.  Skin: Warm, Dry, No erythema, No rash.   Back: No bony tenderness, No CVA tenderness.   Extremities: Intact distal pulses, No edema, No tenderness, No cyanosis  Musculoskeletal: Good range of motion in all major joints. No tenderness to palpation or major deformities noted.   Neurologic: Alert, Normal motor function and gait, Normal sensory function, No focal deficits noted.         DIAGNOSTIC STUDIES / PROCEDURES    EKG - Physician interpretation  Results for orders placed or performed during the hospital encounter of 22   EKG   Result Value Ref Range    Report       [facility] Emergency Dept.    Test Date:  2022  Pt Name:    ILA MARQUEZ                  Department: ER  MRN:        [id number]                      Room:  Gender:     Male                         Technician: [id number]  :        1970                   Requested By:ER TRIAGE PROTOCOL  Order #:    [id number]                    Reading MD: PAYTON HOYT MD    Measurements  Intervals                                Axis  Rate:       81                           P:          51  IA:         142                          QRS:        78  QRSD:       110                          T:          58  QT:         368  QTc:        428    Interpretive Statements  Sinus rhythm  Compared to ECG 2022 15:15:49  No significant changes  Electronically Signed On 2022 10:51:22 PDT by PAYTON HOYT MD         LABS  Labs Reviewed   CBC WITH DIFFERENTIAL - Abnormal; Notable for the following components:       Result Value    WBC 2.3 (*)     RBC 3.37 (*)     Hemoglobin 11.2  (*)     Hematocrit 31.3 (*)     MCH 33.2 (*)     MCHC 35.8 (*)     RDW 54.3 (*)     Platelet Count 82 (*)     Neutrophils-Polys 26.90 (*)     Monocytes 38.50 (*)     Neutrophils (Absolute) 0.62 (*)     Lymphs (Absolute) 0.78 (*)     Monos (Absolute) 0.89 (*)     All other components within normal limits   COMP METABOLIC PANEL - Abnormal; Notable for the following components:    Sodium 132 (*)     Glucose 106 (*)     All other components within normal limits   URINALYSIS   ESTIMATED GFR         RADIOLOGY  CT-HEAD W/O   Final Result      Head CT without contrast within normal limits. No evidence of acute cerebral infarction, hemorrhage or mass lesion.               COURSE & MEDICAL DECISION MAKING    Medications - No data to display    Pertinent Labs & Imaging studies reviewed. (See chart for details)  52 y.o. male presenting after a syncope or seizure event at home.  He states that he remembers getting up to use the restroom and his wife in the next room heard a thud and found him on the ground.  The patient then suddenly screamed out and curled in a ball and then stretched out suddenly rigidly.  No repetitive motions.  No prior history of seizures.  No prior history of syncope or cardiac abnormalities.  No headaches.  No chest pain or trouble breathing.  No lower extremity swelling.    The patient is undergoing treatment for lung cancer with chemotherapy last round was August 23.  His next round is scheduled for September 13.    Laboratory studies were performed which showed some slight pancytopenia though hematocrit is 31.  This may be related to recent chemotherapy administration.  No significant metabolic abnormalities.  No evidence of obvious infectious etiology.    CT head was performed given the patient's fall and concern for possible seizure activity in the setting of metastatic cancer history.  No evidence of intracranial mass or hemorrhage.    Patient was reevaluated at bedside.  He reports feeling fine and  "would like to go home actually.  No chest pain or trouble breathing.  No headache.  No numbness or weakness.  Feels back to normal.  EKG does not show arrhythmia.  No clear cause for the patient's symptoms today.  Symptoms are most consistent with syncope with myoclonus given the sequence of events rather than sudden onset clonic seizure activity.  [place] syncope rule negative.  Low suspicion for subarachnoid hemorrhage or pulmonary embolism.  No significant anemia to cause syncope.  It is possible that the patient simply got up quickly from a supine state and may have had an orthostatic event.    Recommending outpatient follow-up with primary care/oncology for further management.      The patient will not drink alcohol nor drive with prescribed medications.   The patient was instructed to follow-up with primary care physician for further management.  To return immediately for any worsening symptoms or development of any other concerning signs or symptoms. The patient verbalizes understanding in their own words.    /62   Pulse 81   Temp 36 °C (96.8 °F) (Tympanic)   Resp 15   Ht 1.753 m (5' 9\")   Wt 86.6 kg (190 lb 14.7 oz)   SpO2 97%   BMI 28.19 kg/m²     The patient was referred to primary care where they will receive further BP management.      Lakeisha Escobedo, P.A.  [address]  [address]  [facility] [id number] [phone]    Schedule an appointment as soon as possible for a visit       [facility], Emergency Dept  [address] [id number] [phone]    As needed, If symptoms worsen    FINAL IMPRESSION  1. Syncope, unspecified syncope type            Electronically signed by: Gigi Ramirez M.D., 9/2/2022 10:11 AM    "

## 2022-09-02 NOTE — ED NOTES
Pt given d/c instructions and f/u info with verbal understanding.  VSS at discharge.  Pt dressed independently.  Pt ambulatory from the ED w/ steady gait accompanied by spouse.  All belongings in possession on discharge.  Pt escorted to the lobby by RN.

## 2022-09-13 ENCOUNTER — HOSPITAL ENCOUNTER (OUTPATIENT)
Facility: MEDICAL CENTER | Age: 52
End: 2022-09-13
Attending: INTERNAL MEDICINE
Payer: COMMERCIAL

## 2022-09-26 ENCOUNTER — TELEMEDICINE (OUTPATIENT)
Dept: BEHAVIORAL HEALTH | Facility: CLINIC | Age: 52
End: 2022-09-26
Payer: COMMERCIAL

## 2022-09-26 DIAGNOSIS — F33.1 MODERATE RECURRENT MAJOR DEPRESSION (HCC): ICD-10-CM

## 2022-09-26 DIAGNOSIS — F43.10 PTSD (POST-TRAUMATIC STRESS DISORDER): ICD-10-CM

## 2022-09-26 DIAGNOSIS — F41.1 GENERALIZED ANXIETY DISORDER: ICD-10-CM

## 2022-09-26 DIAGNOSIS — F40.01 PANIC DISORDER WITH AGORAPHOBIA AND MILD PANIC ATTACKS: ICD-10-CM

## 2022-09-26 DIAGNOSIS — D02.20: ICD-10-CM

## 2022-09-26 PROCEDURE — 99213 OFFICE O/P EST LOW 20 MIN: CPT | Mod: 95 | Performed by: PSYCHIATRY & NEUROLOGY

## 2022-09-26 PROCEDURE — 90833 PSYTX W PT W E/M 30 MIN: CPT | Mod: 95 | Performed by: PSYCHIATRY & NEUROLOGY

## 2022-09-26 RX ORDER — CLONAZEPAM 0.5 MG/1
0.5 TABLET ORAL 2 TIMES DAILY PRN
Qty: 180 TABLET | Refills: 0 | Status: SHIPPED | OUTPATIENT
Start: 2022-09-26 | End: 2023-01-10

## 2022-09-26 RX ORDER — BUSPIRONE HYDROCHLORIDE 30 MG/1
30 TABLET ORAL 2 TIMES DAILY
Qty: 180 TABLET | Refills: 0 | Status: SHIPPED | OUTPATIENT
Start: 2022-09-26 | End: 2022-12-25

## 2022-09-26 RX ORDER — FLUVOXAMINE MALEATE 100 MG
100 TABLET ORAL 3 TIMES DAILY
Qty: 270 TABLET | Refills: 0 | Status: SHIPPED | OUTPATIENT
Start: 2022-09-26 | End: 2022-12-15

## 2022-09-26 RX ORDER — BUPROPION HYDROCHLORIDE 300 MG/1
300 TABLET ORAL EVERY MORNING
Qty: 90 TABLET | Refills: 0 | Status: SHIPPED | OUTPATIENT
Start: 2022-09-26 | End: 2022-12-25

## 2022-09-26 RX ORDER — PRAZOSIN HYDROCHLORIDE 2 MG/1
4 CAPSULE ORAL NIGHTLY
Qty: 180 CAPSULE | Refills: 0 | Status: SHIPPED | OUTPATIENT
Start: 2022-09-26 | End: 2022-12-15

## 2022-09-26 ASSESSMENT — PATIENT HEALTH QUESTIONNAIRE - PHQ9
7. TROUBLE CONCENTRATING ON THINGS, SUCH AS READING THE NEWSPAPER OR WATCHING TELEVISION: NEARLY EVERY DAY
1. LITTLE INTEREST OR PLEASURE IN DOING THINGS: NEARLY EVERY DAY
8. MOVING OR SPEAKING SO SLOWLY THAT OTHER PEOPLE COULD HAVE NOTICED. OR THE OPPOSITE, BEING SO FIGETY OR RESTLESS THAT YOU HAVE BEEN MOVING AROUND A LOT MORE THAN USUAL: MORE THAN HALF THE DAYS
SUM OF ALL RESPONSES TO PHQ QUESTIONS 1-9: 24
3. TROUBLE FALLING OR STAYING ASLEEP OR SLEEPING TOO MUCH: NEARLY EVERY DAY
9. THOUGHTS THAT YOU WOULD BE BETTER OFF DEAD, OR OF HURTING YOURSELF: MORE THAN HALF THE DAYS
4. FEELING TIRED OR HAVING LITTLE ENERGY: NEARLY EVERY DAY
6. FEELING BAD ABOUT YOURSELF - OR THAT YOU ARE A FAILURE OR HAVE LET YOURSELF OR YOUR FAMILY DOWN: NEARLY EVERY DAY
SUM OF ALL RESPONSES TO PHQ9 QUESTIONS 1 AND 2: 6
5. POOR APPETITE OR OVEREATING: MORE THAN HALF THE DAYS
2. FEELING DOWN, DEPRESSED, IRRITABLE, OR HOPELESS: NEARLY EVERY DAY

## 2022-09-26 NOTE — PROGRESS NOTES
"                                  [facility] PSYCHIATRIC FOLLOW-UP NOTE    This provider informed the patient their medical records are totally confidential except for the use by other providers involved in their care, or if the patient signs a release, or to report instances of child or elder abuse, or if it is determined they are an immediate risk to harm themselves or others.  In order to avoid spread of covid-19 virus this appointment was conducted by telehealth.  The patient gave consent to have this follow up session by video telehealth.  The visit was in the home setting.     This evaluation was conducted via Zoom using secure and encrypted videoconferencing technology.   The patient was in his home in the [facility].     The patient's identity was confirmed and verbal consent was obtained for this virtual visit.     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  Having depressed mood, panic attacks, and traumatic recollections.  HISTORY OF PRESENT ILLNESS       The patient is a  51yo W male who has been disabled by 2 strokes in 2016 and 2018 and he lives with his 3rd wife and off of her income.  He is followed by this provider for recurrent Major Depression, Panic Disorder with Agoraphobia, and Posttraumatic Stress Disorder.  He has had depressive episodes since he had a cerebrovascular accident in Aug 2016.  He had (L) sided weakness and his depression had a typical pattern of anhedonia, decreased sleep and appetite, feeling worthless and hopeless, and having recurrent passive suicidal ideation that \"he'd be better off dead\".  He denies ever having an overt suicidal plan, intent, or attempt.       He has had Panic Disorder with Agoraphobia since his strokes and his panic attacks are triggered when he goes out into public.  He has some symptoms of PTSD with no clear " "Criterion A but \"[place] the streets\" and he saw somebody kill themselves, intrusive trauma recollections and dreams, avoidance and hypervigilance.  His anxiety has been elevated because of the corona virus pandemic and he has isolated himself at home except for driving his 23yo son to work.       He smokes cannabis every night.  He has some neurocognitive impairment since his strokes and has difficulty with dysarthria, word finding, recent memory and weakness in his (L) hand.       He has been treated with radiation seeds for his prostate cancer.         With his recent lung cancer diagnosis, his anxiety and panic attacks have increased and both he and his wife are depressed.  Discussed with the patient, he shouldn't jump the gun, his lung cancer is stage 4, and treatment options need to be defined.  He decided to do chemotherapy and aminotherapy.  PSYCHOSOCIAL CHANGES SINCE PREVIOUS CONTACT       One of his adopted children had a phentanyl overdose but survived and the patient used narcan and called 911.  He gets very tired from his chemotherapy and his lung tumor is shrinking by 50%.    RESPONSE TO TREATMENT       Having more depression because of anemia  PAST PSYCHIATRIC MEDICATIONS       Fluoxetine, buspirone, clonazepam, prazosin, bupropion XL, fluvoxamine.  MEDICATION SIDE EFFECTS       He has a low Hb/HCT from chemotherapy and will max his oncologist he need erythropoetin.    MEDICAL REVIEW OF SYSTEMS:   Constitutional positive - obesity   Eyes negative   Ears/Nose/Mouth/Throat negative   Cardiovascular positive - hyprtension   Respiratory positive - COPD, MARIA ISABEL with CPAP, adenocarcinoma of OSIEL   Gastrointestinal positive - GERD, fatty liver,  diverticulitis   Genitourinary negative   Muscular negative   Integumentary negative   Neurological positive - CVA in 2016 and 2018   Endocrine positive - hyperlipidemia   Hematologic/Lymphatic positive - iron deficiency anemia        MENTAL STATUS " EVALUATION  There were no vitals taken for this visit.  Participation: Active verbal participation  Grooming:Neat  Orientation: Fully Oriented  Eye contact: Good  Behavior:Calm   Mood: Depressed  Affect: Constricted and Blunted  Thought process: Logical  Thought content:  Within normal limits  Speech: Rate within normal limits and Volume within normal limits  Perception:  Within normal limits  Memory:  No gross evidence of memory deficits  Insight: Adequate  Judgment: Adequate  Family/couple interaction observations:   Other:  NARA-7 Questionnaire    Feeling nervous, anxious, or on edge:  3  Not being able to sop or control worryin  Worrying too much about different things:  2  Trouble relaxing:  3  Being so restless that it's hard to sit still:  3  Becoming easily annoyed or irritable:  3  Feeling afraid as if something awful might happen:  2  Total:  18    Interpretation of NARA 7 Total Score   Score Severity :  0-4 No Anxiety   5-9 Mild Anxiety  10-14 Moderate Anxiety  15-21 Severe Anxiety   Current risk:    Suicide: Low   Homicide: Not applicable   Self-harm: Low  Relapse: Moderate  Other:   Crisis Safety Plan reviewed?No  If evidence of imminent risk is present, intervention/plan:    Medical Records/Labs/Diagnostic Tests Reviewed: yes    Medical Records/Labs/Diagnostic Tests Ordered: no    DIAGNOSTIC IMPRESSIONS       (1) Major Depression, Recurrent, Moderate (F33.1)       (2) Panic Disorder with Agoraphobia (F40.01)       (3) Postraumatic Stress Disorder (F43.10)       (4) Cannabis Dependence (F12.20)    ASSESSMENT AND PLAN       Stable on current medications and dosages.       Continue bupropion XL 300mg po QAM.       Increase fluvoxamine 100mg po TID.       Continue buspirone 30mg po BID.       Continue prazosin 4mg po QHS.       Continue clonazepam 0.5mg po BID as needed for panic symptoms.       RTC to  Clinic in 3 months for 30 min follow up with this provider    Time at end of call:  10:00  AM    Greater than 16 minutes spent in psychotherapy exclusive of my E&M.  Topics addressed in psychotherapy include:  He and his wife are both having medical treatment at the same time (she had a hysterectomy for endometriosis).  He has to visualize himself as a survivor to have the greatest success.  He is ntrying to keep the most positive attitude possible.

## 2022-09-30 ENCOUNTER — HOSPITAL ENCOUNTER (OUTPATIENT)
Facility: MEDICAL CENTER | Age: 52
End: 2022-09-30
Attending: INTERNAL MEDICINE
Payer: COMMERCIAL

## 2022-09-30 PROCEDURE — 80053 COMPREHEN METABOLIC PANEL: CPT

## 2022-09-30 PROCEDURE — 84443 ASSAY THYROID STIM HORMONE: CPT

## 2022-10-01 LAB
ALBUMIN SERPL BCP-MCNC: 4.1 G/DL (ref 3.2–4.9)
ALBUMIN/GLOB SERPL: 1.6 G/DL
ALP SERPL-CCNC: 70 U/L (ref 30–99)
ALT SERPL-CCNC: 85 U/L (ref 2–50)
ANION GAP SERPL CALC-SCNC: 15 MMOL/L (ref 7–16)
AST SERPL-CCNC: 59 U/L (ref 12–45)
BILIRUB SERPL-MCNC: 0.3 MG/DL (ref 0.1–1.5)
BUN SERPL-MCNC: 9 MG/DL (ref 8–22)
CALCIUM SERPL-MCNC: 9.1 MG/DL (ref 8.5–10.5)
CHLORIDE SERPL-SCNC: 105 MMOL/L (ref 96–112)
CO2 SERPL-SCNC: 24 MMOL/L (ref 20–33)
CREAT SERPL-MCNC: 1.05 MG/DL (ref 0.5–1.4)
GFR SERPLBLD CREATININE-BSD FMLA CKD-EPI: 85 ML/MIN/1.73 M 2
GLOBULIN SER CALC-MCNC: 2.6 G/DL (ref 1.9–3.5)
GLUCOSE SERPL-MCNC: 151 MG/DL (ref 65–99)
POTASSIUM SERPL-SCNC: 3.4 MMOL/L (ref 3.6–5.5)
PROT SERPL-MCNC: 6.7 G/DL (ref 6–8.2)
SODIUM SERPL-SCNC: 144 MMOL/L (ref 135–145)
TSH SERPL DL<=0.005 MIU/L-ACNC: 1.95 UIU/ML (ref 0.38–5.33)

## 2022-10-04 ENCOUNTER — HOSPITAL ENCOUNTER (OUTPATIENT)
Facility: MEDICAL CENTER | Age: 52
End: 2022-10-04
Attending: NURSE PRACTITIONER
Payer: COMMERCIAL

## 2022-10-04 PROCEDURE — 82533 TOTAL CORTISOL: CPT

## 2022-10-05 LAB — CORTIS SERPL-MCNC: 6.6 UG/DL (ref 0–23)

## 2022-10-21 ENCOUNTER — HOSPITAL ENCOUNTER (OUTPATIENT)
Facility: MEDICAL CENTER | Age: 52
End: 2022-10-21
Attending: INTERNAL MEDICINE
Payer: COMMERCIAL

## 2022-10-21 PROCEDURE — 80053 COMPREHEN METABOLIC PANEL: CPT

## 2022-10-21 PROCEDURE — 84443 ASSAY THYROID STIM HORMONE: CPT

## 2022-10-22 LAB
ALBUMIN SERPL BCP-MCNC: 4 G/DL (ref 3.2–4.9)
ALBUMIN/GLOB SERPL: 1.6 G/DL
ALP SERPL-CCNC: 57 U/L (ref 30–99)
ALT SERPL-CCNC: 38 U/L (ref 2–50)
ANION GAP SERPL CALC-SCNC: 11 MMOL/L (ref 7–16)
AST SERPL-CCNC: 41 U/L (ref 12–45)
BILIRUB SERPL-MCNC: 0.4 MG/DL (ref 0.1–1.5)
BUN SERPL-MCNC: 11 MG/DL (ref 8–22)
CALCIUM SERPL-MCNC: 9.8 MG/DL (ref 8.5–10.5)
CHLORIDE SERPL-SCNC: 105 MMOL/L (ref 96–112)
CO2 SERPL-SCNC: 28 MMOL/L (ref 20–33)
CREAT SERPL-MCNC: 1.27 MG/DL (ref 0.5–1.4)
GFR SERPLBLD CREATININE-BSD FMLA CKD-EPI: 67 ML/MIN/1.73 M 2
GLOBULIN SER CALC-MCNC: 2.5 G/DL (ref 1.9–3.5)
GLUCOSE SERPL-MCNC: 112 MG/DL (ref 65–99)
POTASSIUM SERPL-SCNC: 4 MMOL/L (ref 3.6–5.5)
PROT SERPL-MCNC: 6.5 G/DL (ref 6–8.2)
SODIUM SERPL-SCNC: 144 MMOL/L (ref 135–145)
TSH SERPL DL<=0.005 MIU/L-ACNC: 2.99 UIU/ML (ref 0.38–5.33)

## 2022-11-11 ENCOUNTER — HOSPITAL ENCOUNTER (OUTPATIENT)
Facility: MEDICAL CENTER | Age: 52
End: 2022-11-11
Attending: INTERNAL MEDICINE
Payer: COMMERCIAL

## 2022-11-11 LAB
ALBUMIN SERPL BCP-MCNC: 3.8 G/DL (ref 3.2–4.9)
ALBUMIN/GLOB SERPL: 1.2 G/DL
ALP SERPL-CCNC: 60 U/L (ref 30–99)
ALT SERPL-CCNC: 25 U/L (ref 2–50)
ANION GAP SERPL CALC-SCNC: 13 MMOL/L (ref 7–16)
AST SERPL-CCNC: 31 U/L (ref 12–45)
BILIRUB SERPL-MCNC: 0.3 MG/DL (ref 0.1–1.5)
BUN SERPL-MCNC: 15 MG/DL (ref 8–22)
CALCIUM SERPL-MCNC: 9.1 MG/DL (ref 8.5–10.5)
CHLORIDE SERPL-SCNC: 104 MMOL/L (ref 96–112)
CO2 SERPL-SCNC: 25 MMOL/L (ref 20–33)
CREAT SERPL-MCNC: 1.49 MG/DL (ref 0.5–1.4)
GFR SERPLBLD CREATININE-BSD FMLA CKD-EPI: 56 ML/MIN/1.73 M 2
GLOBULIN SER CALC-MCNC: 3.2 G/DL (ref 1.9–3.5)
GLUCOSE SERPL-MCNC: 134 MG/DL (ref 65–99)
POTASSIUM SERPL-SCNC: 3.9 MMOL/L (ref 3.6–5.5)
PROT SERPL-MCNC: 7 G/DL (ref 6–8.2)
SODIUM SERPL-SCNC: 142 MMOL/L (ref 135–145)

## 2022-11-11 PROCEDURE — 84443 ASSAY THYROID STIM HORMONE: CPT

## 2022-11-11 PROCEDURE — 80053 COMPREHEN METABOLIC PANEL: CPT

## 2022-11-12 LAB — TSH SERPL DL<=0.005 MIU/L-ACNC: 2.83 UIU/ML (ref 0.38–5.33)

## 2022-12-02 ENCOUNTER — OUTPATIENT INFUSION SERVICES (OUTPATIENT)
Dept: ONCOLOGY | Facility: MEDICAL CENTER | Age: 52
End: 2022-12-02
Attending: INTERNAL MEDICINE
Payer: COMMERCIAL

## 2022-12-02 VITALS
HEIGHT: 69 IN | SYSTOLIC BLOOD PRESSURE: 132 MMHG | OXYGEN SATURATION: 96 % | BODY MASS INDEX: 29.32 KG/M2 | DIASTOLIC BLOOD PRESSURE: 80 MMHG | WEIGHT: 197.97 LBS | TEMPERATURE: 98.3 F | HEART RATE: 75 BPM | RESPIRATION RATE: 18 BRPM

## 2022-12-02 DIAGNOSIS — C34.12 MALIGNANT NEOPLASM OF UPPER LOBE OF LEFT LUNG (HCC): ICD-10-CM

## 2022-12-02 DIAGNOSIS — D64.81 ANEMIA ASSOCIATED WITH CHEMOTHERAPY: ICD-10-CM

## 2022-12-02 DIAGNOSIS — T45.1X5A ANEMIA ASSOCIATED WITH CHEMOTHERAPY: ICD-10-CM

## 2022-12-02 LAB
ABO GROUP BLD: NORMAL
BARCODED ABORH UBTYP: 5100
BARCODED ABORH UBTYP: 5100
BARCODED PRD CODE UBPRD: NORMAL
BARCODED PRD CODE UBPRD: NORMAL
BARCODED UNIT NUM UBUNT: NORMAL
BARCODED UNIT NUM UBUNT: NORMAL
BLD GP AB SCN SERPL QL: NORMAL
COMPONENT R 8504R: NORMAL
COMPONENT R 8504R: NORMAL
PRODUCT TYPE UPROD: NORMAL
PRODUCT TYPE UPROD: NORMAL
RH BLD: NORMAL
UNIT STATUS USTAT: NORMAL
UNIT STATUS USTAT: NORMAL

## 2022-12-02 PROCEDURE — 700105 HCHG RX REV CODE 258: Performed by: NURSE PRACTITIONER

## 2022-12-02 PROCEDURE — 700102 HCHG RX REV CODE 250 W/ 637 OVERRIDE(OP): Performed by: NURSE PRACTITIONER

## 2022-12-02 PROCEDURE — 86850 RBC ANTIBODY SCREEN: CPT

## 2022-12-02 PROCEDURE — 86923 COMPATIBILITY TEST ELECTRIC: CPT

## 2022-12-02 PROCEDURE — 700111 HCHG RX REV CODE 636 W/ 250 OVERRIDE (IP)

## 2022-12-02 PROCEDURE — P9016 RBC LEUKOCYTES REDUCED: HCPCS

## 2022-12-02 PROCEDURE — 86945 BLOOD PRODUCT/IRRADIATION: CPT

## 2022-12-02 PROCEDURE — 36430 TRANSFUSION BLD/BLD COMPNT: CPT

## 2022-12-02 PROCEDURE — 86900 BLOOD TYPING SEROLOGIC ABO: CPT

## 2022-12-02 PROCEDURE — A9270 NON-COVERED ITEM OR SERVICE: HCPCS | Performed by: NURSE PRACTITIONER

## 2022-12-02 PROCEDURE — 700111 HCHG RX REV CODE 636 W/ 250 OVERRIDE (IP): Performed by: NURSE PRACTITIONER

## 2022-12-02 PROCEDURE — 86901 BLOOD TYPING SEROLOGIC RH(D): CPT

## 2022-12-02 PROCEDURE — 306780 HCHG STAT FOR TRANSFUSION PER CASE

## 2022-12-02 PROCEDURE — 96365 THER/PROPH/DIAG IV INF INIT: CPT

## 2022-12-02 RX ORDER — ACETAMINOPHEN 325 MG/1
650 TABLET ORAL ONCE
Status: CANCELLED | OUTPATIENT
Start: 2022-12-02

## 2022-12-02 RX ORDER — AMLODIPINE BESYLATE 5 MG/1
5 TABLET ORAL
COMMUNITY
Start: 2022-11-16 | End: 2023-02-01

## 2022-12-02 RX ORDER — ACETAMINOPHEN 325 MG/1
650 TABLET ORAL ONCE
Status: COMPLETED | OUTPATIENT
Start: 2022-12-02 | End: 2022-12-02

## 2022-12-02 RX ORDER — HEPARIN SODIUM (PORCINE) LOCK FLUSH IV SOLN 100 UNIT/ML 100 UNIT/ML
SOLUTION INTRAVENOUS
Status: COMPLETED
Start: 2022-12-02 | End: 2022-12-02

## 2022-12-02 RX ORDER — DIPHENHYDRAMINE HYDROCHLORIDE 50 MG/ML
25 INJECTION INTRAMUSCULAR; INTRAVENOUS PRN
Status: CANCELLED | OUTPATIENT
Start: 2022-12-02

## 2022-12-02 RX ORDER — ACETAMINOPHEN 325 MG/1
650 TABLET ORAL PRN
Status: CANCELLED | OUTPATIENT
Start: 2022-12-02

## 2022-12-02 RX ADMIN — HEPARIN 500 UNITS: 100 SYRINGE at 17:24

## 2022-12-02 RX ADMIN — DIPHENHYDRAMINE HYDROCHLORIDE 25 MG: 50 INJECTION, SOLUTION INTRAMUSCULAR; INTRAVENOUS at 12:21

## 2022-12-02 RX ADMIN — ACETAMINOPHEN 650 MG: 325 TABLET ORAL at 12:17

## 2022-12-02 ASSESSMENT — FIBROSIS 4 INDEX: FIB4 SCORE: 4.01

## 2022-12-03 NOTE — PROGRESS NOTES
"Patient to [facility] for 2 units of PRBC's. PORT was already accessed.Flushed with + blood return, lab drawn. Premeds given. 2 units of PRBC's infused with no s/s of infusion reaction. VS taken per protocol. /80   Pulse 75   Temp 36.8 °C (98.3 °F) (Temporal)   Resp 18   Ht 1.753 m (5' 9\")   Wt 89.8 kg (197 lb 15.6 oz)   SpO2 96%   BMI 29.24 kg/m²   SASH performed and port de-accessed. Gauze and paper tape applied as a dressing. Patient does not need future appointment at this time. Patient to home in care of spouse.   "

## 2022-12-15 RX ORDER — FLUVOXAMINE MALEATE 100 MG
TABLET ORAL
Qty: 270 TABLET | Refills: 0 | Status: SHIPPED | OUTPATIENT
Start: 2022-12-15 | End: 2023-03-16

## 2022-12-15 RX ORDER — PRAZOSIN HYDROCHLORIDE 2 MG/1
4 CAPSULE ORAL NIGHTLY
Qty: 180 CAPSULE | Refills: 0 | Status: SHIPPED | OUTPATIENT
Start: 2022-12-15 | End: 2023-03-16

## 2022-12-15 NOTE — TELEPHONE ENCOUNTER
Received request via: Pharmacy    Was the patient seen in the last year in this department? Yes    Does the patient have an active prescription (recently filled or refills available) for medication(s) requested? No    Does the patient have longterm Plus and need 100 day supply (blood pressure, diabetes and cholesterol meds only)? Medication is not for cholesterol, blood pressure or diabetes and Patient does not have SCP

## 2023-02-01 ENCOUNTER — PRE-ADMISSION TESTING (OUTPATIENT)
Dept: ADMISSIONS | Facility: MEDICAL CENTER | Age: 53
End: 2023-02-01
Attending: INTERNAL MEDICINE
Payer: COMMERCIAL

## 2023-02-01 RX ORDER — FAMOTIDINE 40 MG/1
40 TABLET, FILM COATED ORAL 2 TIMES DAILY
COMMUNITY

## 2023-02-01 RX ORDER — BUSPIRONE HYDROCHLORIDE 30 MG/1
30 TABLET ORAL 2 TIMES DAILY
COMMUNITY
End: 2023-03-16

## 2023-02-01 RX ORDER — LISINOPRIL 40 MG/1
40 TABLET ORAL DAILY
COMMUNITY

## 2023-02-01 RX ORDER — BUPROPION HYDROCHLORIDE 300 MG/1
300 TABLET ORAL EVERY MORNING
COMMUNITY

## 2023-02-02 ENCOUNTER — APPOINTMENT (OUTPATIENT)
Dept: RADIOLOGY | Facility: MEDICAL CENTER | Age: 53
End: 2023-02-02
Attending: INTERNAL MEDICINE
Payer: COMMERCIAL

## 2023-02-02 ENCOUNTER — HOSPITAL ENCOUNTER (OUTPATIENT)
Facility: MEDICAL CENTER | Age: 53
End: 2023-02-02
Attending: INTERNAL MEDICINE | Admitting: INTERNAL MEDICINE
Payer: COMMERCIAL

## 2023-02-02 VITALS
HEIGHT: 69 IN | BODY MASS INDEX: 29.03 KG/M2 | HEART RATE: 75 BPM | WEIGHT: 195.99 LBS | DIASTOLIC BLOOD PRESSURE: 70 MMHG | SYSTOLIC BLOOD PRESSURE: 130 MMHG | TEMPERATURE: 96.8 F | OXYGEN SATURATION: 95 % | RESPIRATION RATE: 16 BRPM

## 2023-02-02 DIAGNOSIS — N17.9 ACUTE RENAL FAILURE, UNSPECIFIED ACUTE RENAL FAILURE TYPE (HCC): ICD-10-CM

## 2023-02-02 DIAGNOSIS — I10 HYPERTENSION, UNSPECIFIED TYPE: ICD-10-CM

## 2023-02-02 DIAGNOSIS — C34.90 MALIGNANT NEOPLASM OF BRONCHUS AND LUNG (HCC): ICD-10-CM

## 2023-02-02 LAB
ERYTHROCYTE [DISTWIDTH] IN BLOOD BY AUTOMATED COUNT: 49.8 FL (ref 35.9–50)
HCT VFR BLD AUTO: 35 % (ref 42–52)
HGB BLD-MCNC: 11.3 G/DL (ref 14–18)
INR PPP: 1.17 (ref 0.87–1.13)
MCH RBC QN AUTO: 31.2 PG (ref 27–33)
MCHC RBC AUTO-ENTMCNC: 32.3 G/DL (ref 33.7–35.3)
MCV RBC AUTO: 96.7 FL (ref 81.4–97.8)
PATHOLOGY CONSULT NOTE: NORMAL
PLATELET # BLD AUTO: 218 K/UL (ref 164–446)
PMV BLD AUTO: 9.7 FL (ref 9–12.9)
PROTHROMBIN TIME: 14.8 SEC (ref 12–14.6)
RBC # BLD AUTO: 3.62 M/UL (ref 4.7–6.1)
WBC # BLD AUTO: 5.9 K/UL (ref 4.8–10.8)

## 2023-02-02 PROCEDURE — 88300 SURGICAL PATH GROSS: CPT

## 2023-02-02 PROCEDURE — 88346 IMFLUOR 1ST 1ANTB STAIN PX: CPT

## 2023-02-02 PROCEDURE — 85610 PROTHROMBIN TIME: CPT

## 2023-02-02 PROCEDURE — 700105 HCHG RX REV CODE 258: Performed by: RADIOLOGY

## 2023-02-02 PROCEDURE — 85027 COMPLETE CBC AUTOMATED: CPT

## 2023-02-02 PROCEDURE — 88313 SPECIAL STAINS GROUP 2: CPT | Mod: 91

## 2023-02-02 PROCEDURE — 160046 HCHG PACU - 1ST 60 MINS PHASE II

## 2023-02-02 PROCEDURE — 160002 HCHG RECOVERY MINUTES (STAT)

## 2023-02-02 PROCEDURE — 36415 COLL VENOUS BLD VENIPUNCTURE: CPT

## 2023-02-02 PROCEDURE — 88350 IMFLUOR EA ADDL 1ANTB STN PX: CPT | Mod: 91

## 2023-02-02 PROCEDURE — 160035 HCHG PACU - 1ST 60 MINS PHASE I

## 2023-02-02 PROCEDURE — 700111 HCHG RX REV CODE 636 W/ 250 OVERRIDE (IP)

## 2023-02-02 PROCEDURE — 160036 HCHG PACU - EA ADDL 30 MINS PHASE I

## 2023-02-02 PROCEDURE — 88348 ELECTRON MICROSCOPY DX: CPT

## 2023-02-02 PROCEDURE — 88305 TISSUE EXAM BY PATHOLOGIST: CPT

## 2023-02-02 PROCEDURE — 77012 CT SCAN FOR NEEDLE BIOPSY: CPT

## 2023-02-02 RX ORDER — ONDANSETRON 2 MG/ML
4 INJECTION INTRAMUSCULAR; INTRAVENOUS PRN
Status: DISCONTINUED | OUTPATIENT
Start: 2023-02-02 | End: 2023-02-02 | Stop reason: HOSPADM

## 2023-02-02 RX ORDER — ONDANSETRON 2 MG/ML
4 INJECTION INTRAMUSCULAR; INTRAVENOUS EVERY 8 HOURS PRN
Status: DISCONTINUED | OUTPATIENT
Start: 2023-02-02 | End: 2023-02-02 | Stop reason: HOSPADM

## 2023-02-02 RX ORDER — OXYCODONE HYDROCHLORIDE 5 MG/1
2.5 TABLET ORAL
Status: DISCONTINUED | OUTPATIENT
Start: 2023-02-02 | End: 2023-02-02 | Stop reason: HOSPADM

## 2023-02-02 RX ORDER — MIDAZOLAM HYDROCHLORIDE 1 MG/ML
INJECTION INTRAMUSCULAR; INTRAVENOUS
Status: COMPLETED
Start: 2023-02-02 | End: 2023-02-02

## 2023-02-02 RX ORDER — SODIUM CHLORIDE 9 MG/ML
1000 INJECTION, SOLUTION INTRAVENOUS ONCE
Status: COMPLETED | OUTPATIENT
Start: 2023-02-02 | End: 2023-02-02

## 2023-02-02 RX ORDER — SODIUM CHLORIDE 9 MG/ML
500 INJECTION, SOLUTION INTRAVENOUS
Status: DISCONTINUED | OUTPATIENT
Start: 2023-02-02 | End: 2023-02-02 | Stop reason: HOSPADM

## 2023-02-02 RX ORDER — LIDOCAINE HYDROCHLORIDE 10 MG/ML
INJECTION, SOLUTION EPIDURAL; INFILTRATION; INTRACAUDAL; PERINEURAL
Status: COMPLETED
Start: 2023-02-02 | End: 2023-02-02

## 2023-02-02 RX ORDER — MIDAZOLAM HYDROCHLORIDE 1 MG/ML
.5-2 INJECTION INTRAMUSCULAR; INTRAVENOUS PRN
Status: DISCONTINUED | OUTPATIENT
Start: 2023-02-02 | End: 2023-02-02 | Stop reason: HOSPADM

## 2023-02-02 RX ORDER — HYDROMORPHONE HYDROCHLORIDE 1 MG/ML
0.25 INJECTION, SOLUTION INTRAMUSCULAR; INTRAVENOUS; SUBCUTANEOUS
Status: DISCONTINUED | OUTPATIENT
Start: 2023-02-02 | End: 2023-02-02 | Stop reason: HOSPADM

## 2023-02-02 RX ADMIN — FENTANYL CITRATE 50 MCG: 50 INJECTION, SOLUTION INTRAMUSCULAR; INTRAVENOUS at 08:37

## 2023-02-02 RX ADMIN — LIDOCAINE HYDROCHLORIDE: 10 INJECTION, SOLUTION EPIDURAL; INFILTRATION; INTRACAUDAL; PERINEURAL at 08:36

## 2023-02-02 RX ADMIN — SODIUM CHLORIDE 1000 ML: 9 INJECTION, SOLUTION INTRAVENOUS at 07:46

## 2023-02-02 RX ADMIN — MIDAZOLAM HYDROCHLORIDE 1 MG: 1 INJECTION INTRAMUSCULAR; INTRAVENOUS at 08:37

## 2023-02-02 RX ADMIN — MIDAZOLAM HYDROCHLORIDE 1 MG: 1 INJECTION, SOLUTION INTRAMUSCULAR; INTRAVENOUS at 08:37

## 2023-02-02 ASSESSMENT — PAIN DESCRIPTION - PAIN TYPE
TYPE: ACUTE PAIN;SURGICAL PAIN
TYPE: ACUTE PAIN;SURGICAL PAIN
TYPE: ACUTE PAIN
TYPE: SURGICAL PAIN
TYPE: ACUTE PAIN;SURGICAL PAIN
TYPE: ACUTE PAIN
TYPE: ACUTE PAIN

## 2023-02-02 ASSESSMENT — FIBROSIS 4 INDEX: FIB4 SCORE: 4.01

## 2023-02-02 NOTE — DISCHARGE INSTRUCTIONS
HOME CARE INSTRUCTIONS    ACTIVITY: Rest and take it easy for the first 24 hours.  A responsible adult is recommended to remain with you during that time.  It is normal to feel sleepy.  We encourage you to not do anything that requires balance, judgment or coordination.    FOR 24 HOURS DO NOT:  Drive, operate machinery or run household appliances.  Drink beer or alcoholic beverages.  Make important decisions or sign legal documents.    SPECIAL INSTRUCTIONS: Percutaneous Kidney Biopsy, Care After  This sheet gives you information about how to care for yourself after your procedure. Your health care provider may also give you more specific instructions. If you have problems or questions, contact your health care provider.  What can I expect after the procedure?  After the procedure, it is common to have:  Pain or soreness near the area where the needle went through your skin (biopsy site).  Bright pink or cloudy urine for 24 hours after the procedure.  Follow these instructions at home:  Activity  Return to your normal activities as told by your health care provider. Ask your health care provider what activities are safe for you.  Do not drive for 24 hours if you were given a medicine to help you relax (sedative).  Do not lift anything that is heavier than 10 lb (4.5 kg) until your health care provider tells you that it is safe.  Avoid activities that take a lot of effort (are strenuous) until your health care provider approves. Most people will have to wait 2 weeks before returning to activities such as exercise or sexual intercourse.  General instructions  Take over-the-counter and prescription medicines only as told by your health care provider.  You may eat and drink after your procedure. Follow instructions from your health care provider about eating or drinking restrictions.  Check your biopsy site every day for signs of infection. Check for:  More redness, swelling, or pain.  More fluid or blood.  Warmth.  Pus  or a bad smell.  Keep all follow-up visits as told by your health care provider. This is important.  Contact a health care provider if:  You have more redness, swelling, or pain around your biopsy site.  You have more fluid or blood coming from your biopsy site.  Your biopsy site feels warm to the touch.  You have pus or a bad smell coming from your biopsy site.  You have blood in your urine more than 24 hours after your procedure.  Get help right away if:  You have dark red or brown urine.  You have a fever.  You are unable to urinate.  You feel burning when you urinate.  You feel faint.  You have severe pain in your abdomen or side.  This information is not intended to replace advice given to you by your health care provider. Make sure you discuss any questions you have with your health care provider.  Document Released: 08/20/2014 Document Revised: 11/30/2018 Document Reviewed: 09/29/2017  [employer] Patient Education © 2020 [employer]      DIET: To avoid nausea, slowly advance diet as tolerated, avoiding spicy or greasy foods for the first day.  Add more substantial food to your diet according to your physician's instructions.  Babies can be fed formula or breast milk as soon as they are hungry.  INCREASE FLUIDS AND FIBER TO AVOID CONSTIPATION.    SURGICAL DRESSING/BATHING: may remove dressing in 24 hours and then shower    MEDICATIONS: Resume taking daily medication.  Take prescribed pain medication with food.  If no medication is prescribed, you may take non-aspirin pain medication if needed.  PAIN MEDICATION CAN BE VERY CONSTIPATING.  Take a stool softener or laxative such as senokot, pericolace, or milk of magnesia if needed.    A follow-up appointment should be arranged with your doctor; call to schedule.    You should CALL YOUR PHYSICIAN if you develop:  Fever greater than 101 degrees F.  Pain not relieved by medication, or persistent nausea or vomiting.  Excessive bleeding (blood soaking through  dressing) or unexpected drainage from the wound.  Extreme redness or swelling around the incision site, drainage of pus or foul smelling drainage.  Inability to urinate or empty your bladder within 8 hours.  Problems with breathing or chest pain.    You should call 911 if you develop problems with breathing or chest pain.  If you are unable to contact your doctor or surgical center, you should go to the nearest emergency room or urgent care center.  Physician's telephone #: [phone] - Dr. Hood    MILD FLU-LIKE SYMPTOMS ARE NORMAL.  YOU MAY EXPERIENCE GENERALIZED MUSCLE ACHES, THROAT IRRITATION, HEADACHE AND/OR SOME NAUSEA.    If any questions arise, call your doctor.  If your doctor is not available, please feel free to call the Surgical Center at [phone].  The Center is open Monday through Friday from 7AM to 7PM.      A registered nurse may call you a few days after your surgery to see how you are doing after your procedure.    You may also receive a survey in the mail within the next two weeks and we ask that you take a few moments to complete the survey and return it to us.  Our goal is to provide you with very good care and we value your comments.     Depression / Suicide Risk    As you are discharged from this [facility] facility, it is important to learn how to keep safe from harming yourself.    Recognize the warning signs:  Abrupt changes in personality, positive or negative- including increase in energy   Giving away possessions  Change in eating patterns- significant weight changes-  positive or negative  Change in sleeping patterns- unable to sleep or sleeping all the time   Unwillingness or inability to communicate  Depression  Unusual sadness, discouragement and loneliness  Talk of wanting to die  Neglect of personal appearance   Rebelliousness- reckless behavior  Withdrawal from people/activities they love  Confusion- inability to concentrate     If you or a loved one observes any of  these behaviors or has concerns about self-harm, here's what you can do:  Talk about it- your feelings and reasons for harming yourself  Remove any means that you might use to hurt yourself (examples: pills, rope, extension cords, firearm)  Get professional help from the community (Mental Health, Substance Abuse, psychological counseling)  Do not be alone:Call your Safe Contact- someone whom you trust who will be there for you.  Call your local CRISIS HOTLINE 194-3732 or 917-246-9244  Call your local Children's Mobile Crisis Response Team [place] (178) 497-0455 or www.[employer]  Call the toll free National Suicide Prevention Hotlines   National Suicide Prevention Lifeline 237-211-VPHD (0197)  National Hope Line Network 800-SUICIDE (394-3176)    I acknowledge receipt and understanding of these Home Care instructions.

## 2023-02-02 NOTE — OR NURSING
Report given to WYATT Lauren. VSS. No distress. Dressing CDI. Pt transported to phase II with RN. Family updated.

## 2023-02-02 NOTE — OR SURGEON
Immediate Post- Operative Note    PostOp Diagnosis: RENAL INSUFFICIENCY, ON CHEMOTHERAPY      Procedure(s): CT GUIDED LEFT RENAL BIOPSY    18G CORES BIOPINCE NEEDLE X 5    SUBMITTED TO ATTENDING PATHOLOGIST      Estimated Blood Loss: <5CC      FINDINGS: NEEDLE CONFIRMED AT LOWER POLE LEFT KIDNEY. NO PERINEPHRIC HEMORRHAGE        Complications: NONE            2/2/2023     8:55 AM     Chay Hood M.D.

## 2023-02-02 NOTE — PROGRESS NOTES
Patient in pre-op, assessment completed, patient and wife cecy updated on plan of care, all questions answered, no further needs at this time, call light within reach.

## 2023-02-02 NOTE — PROGRESS NOTES
Patient underwent a Left Renal Nontargeted biopsy by Dr. Hood. Time out performed at 0836. Procedure site marked by MD and verified using imaging guidance. Pt placed in Prone position for procedure, pressure points checked, padded, and monitored appropriately. Vitals taken every 5 minutes and remained stable during procedure (see doc flowsheet). CO2 waveform capnography utilized for patient monitoring and remained WNL throughout procedure. A gauze and tegaderm dressing placed over surgical site, CDI. Report called to WYATT Coronado. Pt transported via gurney with RN on monitor to PACU. Cores X 5 collected by MD intra-op and given by MD to pathologist abs.

## 2023-02-02 NOTE — OR NURSING
1045 - discharge instructions reviewed with pt and pt's wife by Valeria SCHNEIDER.    1100 - bedrest completed per orders. Pt dressed. Ambulated to bathroom, voided.    1106 - pt taken to car via wheelchair by CNA.

## 2023-03-16 DIAGNOSIS — F51.5 NIGHTMARE DISORDER: ICD-10-CM

## 2023-03-16 DIAGNOSIS — F33.1 MODERATE RECURRENT MAJOR DEPRESSION (HCC): ICD-10-CM

## 2023-03-16 DIAGNOSIS — F41.1 GENERALIZED ANXIETY DISORDER: ICD-10-CM

## 2023-03-16 DIAGNOSIS — F40.01 PANIC DISORDER WITH AGORAPHOBIA: ICD-10-CM

## 2023-03-16 RX ORDER — PRAZOSIN HYDROCHLORIDE 2 MG/1
4 CAPSULE ORAL NIGHTLY
Qty: 180 CAPSULE | Refills: 0 | Status: SHIPPED | OUTPATIENT
Start: 2023-03-16 | End: 2023-07-17

## 2023-03-16 RX ORDER — BUSPIRONE HYDROCHLORIDE 30 MG/1
TABLET ORAL
Qty: 180 TABLET | Refills: 0 | Status: SHIPPED | OUTPATIENT
Start: 2023-03-16

## 2023-03-16 RX ORDER — FLUVOXAMINE MALEATE 100 MG
TABLET ORAL
Qty: 270 TABLET | Refills: 0 | Status: SHIPPED | OUTPATIENT
Start: 2023-03-16 | End: 2023-07-17

## 2023-06-20 ENCOUNTER — HOSPITAL ENCOUNTER (OUTPATIENT)
Dept: RADIOLOGY | Facility: MEDICAL CENTER | Age: 53
End: 2023-06-20
Attending: INTERNAL MEDICINE
Payer: COMMERCIAL

## 2023-11-22 ENCOUNTER — HOSPITAL ENCOUNTER (OUTPATIENT)
Dept: RADIOLOGY | Facility: MEDICAL CENTER | Age: 53
End: 2023-11-22
Payer: COMMERCIAL

## 2024-01-26 NOTE — PROGRESS NOTES
[facility]       Date Of Service: 01/26/24    History of present illness:   This is a 53 year old female with PMH of hypertension, hyperlipidemia, and diabetes mellitus. Patient presented to the ER with complaints of a fall at home yesterday. Patient's son is at the bedside and reports that patient has difficulty moving around at home due to diabetic peripheral neuropathy. Last night patient was walking from her bedroom to the bathroom when she fell. Patient denies LOC or feeling dizzy prior to fall and reports that her legs just felt weak and \"gave out\"; patient does report hitting her head. Per patient's son patient was initially mildly confused when paramedics arrived to the home but son believes this was from the fall as patient is reportedly back to her baseline neurologically. Per patient's son patient has declined over the past 6 months and is homebound, largely bedbound, and uses Depends at home as patient has difficulty getting out of bed and walking due to neuropathy.     Initial work up in the ER significant for WBC 2.7, Hgb 8.5, troponin 50, UA positive for large blood and large leukocyte, COVID/Flu/RSV negative, CT head with no acute intracranial abnormality, atrophy and extensive chronic small vessel change, and paranasal sinus disease, and CT cervical/lumbar spine pending. Patient was mildly hypertensive during ER evaluation but otherwise vitals were stable; patient received dose of ceftriaxone. Decision was made to admit for further neurological evaluation and management.     Past Medical History  Past Medical History:   Diagnosis Date    Anemia     Diabetes mellitus (CMD)     Essential (primary) hypertension     HTN (hypertension) 4/23/2015    Hyperlipidemia        Surgical History  Past Surgical History:   Procedure Laterality Date    Gallbladder surgery          Social History  Social History     Tobacco Use    Smoking status: Never    Smokeless tobacco: Never   Substance  CC:  Diagnoses of Essential hypertension, Dyslipidemia, Iron deficiency anemia due to chronic blood loss, Elevated liver enzymes, Tobacco dependence, and Prediabetes were pertinent to this visit.    HISTORY OF THE PRESENT ILLNESS: Patient is a 49 y.o. male. This pleasant patient is here today to follow-up.    Since last appointment we started hydrochlorothiazide which has been well-tolerated and now blood pressure is well controlled.  Labs from 12/26/2019 show normal electrolytes.  He has been watching his diet and his fasting glucose has normalized on most recent labs.  Though his AST 50 and ALT 80, thought to be due to fatty liver disease, remained minimally elevated.  He does not wish to see a nutritionist to work on weight loss goals, he plans to do this on his own.  He has no new GI symptoms.  He still pending follow-up with GI for pill endoscopy for his history of iron deficient anemia.    Says he quit tobacco 12/31/19 and he was congratulated.    Still follows with his cardiologist, on Repatha his cholesterol has significantly improved noted his LDL is now controlled at 60, additionally HDL 31, triglycerides 126 and total cholesterol 116 from labs 12/26/2019.    Allergies: Patient has no known allergies.    Current Outpatient Medications Ordered in Epic   Medication Sig Dispense Refill   • hydrochlorothiazide (MICROZIDE) 12.5 MG capsule Take 1 Cap by mouth every day. 30 Cap 3   • ferrous sulfate 325 (65 Fe) MG tablet TAKE 1 TABLET BY MOUTH TWICE A  Tab 2   • busPIRone (BUSPAR) 30 MG tablet Take 1 Tab by mouth 2 times a day. 180 Tab 0   • fluoxetine (PROZAC) 40 MG capsule Take 2 Caps by mouth every day. 40mg twice daily to equal 80mg 180 Cap 0   • prazosin (MINIPRESS) 2 MG Cap Take 1 Cap by mouth every day. 90 Cap 0   • lisinopril (PRINIVIL, ZESTRIL) 40 MG tablet Take 1 Tab by mouth every day. 90 Tab 3   • fluticasone (FLONASE) 50 MCG/ACT nasal spray 1 SPRAY INTO EACH NOSTRIL EVERY DAY. 1 Bottle 6   •  Use Topics    Alcohol use: Not Currently    Drug use: Never         Allergies  ALLERGIES:  Patient has no known allergies.    Home Meds:  (Not in a hospital admission)      Current Medications:  Scheduled  Current Facility-Administered Medications   Medication Dose Route Frequency Provider Last Rate Last Admin    amLODIPine (NORVASC) tablet 10 mg  10 mg Oral Daily Makeda Panda CNP        sodium chloride 0.9 % injection 2 mL  2 mL Intracatheter 2 times per day Makeda Panda CNP        heparin (porcine) injection 5,000 Units  5,000 Units Subcutaneous 3 times per day Makeda Panda CNP        cefTRIAXone (ROCEPHIN) syringe 1,000 mg  1,000 mg Intravenous Daily Makeda Panda CNP        Potassium Standard Replacement Protocol (Levels 3.5 and lower)   Does not apply See Admin Instructions Makeda Panda CNP        Magnesium Standard Replacement Protocol   Does not apply See Admin Instructions Makeda Panda CNP        insulin lispro (ADMELOG,HumaLOG) - Correction Dose   Subcutaneous TID WC Makeda Panda CNP        insulin lispro (ADMELOG,HumaLOG) - Correction Dose   Subcutaneous Nightly Makeda Panda CNP           Infusions  Current Facility-Administered Medications   Medication Dose Route Frequency Provider Last Rate Last Admin       PRN  Current Facility-Administered Medications   Medication Dose Route Frequency    sodium chloride 0.9 % flush bag 25 mL  25 mL Intravenous PRN    sodium chloride (NORMAL SALINE) 0.9 % bolus 500 mL  500 mL Intravenous PRN    dextrose 50 % injection 25 g  25 g Intravenous PRN    dextrose 50 % injection 12.5 g  12.5 g Intravenous PRN    glucagon (GLUCAGEN) injection 1 mg  1 mg Intramuscular PRN    dextrose (GLUTOSE) 40 % gel 15 g  15 g Oral PRN    dextrose (GLUTOSE) 40 % gel 30 g  30 g Oral PRN    acetaminophen (TYLENOL) tablet 650 mg  650 mg Oral Q4H PRN    Or    acetaminophen (TYLENOL) suppository 650 mg  650 mg Rectal Q4H PRN    ondansetron (ZOFRAN ODT)  urea (CARMOL) 10 % cream Use after showers and daily all over to dry skin patches. 2 Tube 3   • cyclobenzaprine (FLEXERIL) 10 MG Tab Take 10 mg by mouth as needed.  1   • loratadine (CLARITIN) 10 MG Tab Take 10 mg by mouth every day.     • aspirin (ASA) 81 MG Chew Tab chewable tablet Take 81 mg by mouth every day.     • cyanocobalamin (VITAMIN B12) 1000 MCG Tab Take 1 Tab by mouth every day. 30 Tab 11   • atorvastatin (LIPITOR) 80 MG tablet Take 80 mg by mouth every day.  11   • clopidogrel (PLAVIX) 75 MG Tab Take 75 mg by mouth every day.  1   • fenofibrate (TRICOR) 145 MG Tab TAKE 1 TABLET BY ORAL ROUTE EVERY DAY BEFORE BED  5   • omeprazole (PRILOSEC) 20 MG delayed-release capsule Take 20 mg by mouth every day.     • albuterol (VENTOLIN OR PROVENTIL) 108 (90 BASE) MCG/ACT AERS Inhale 2 Puffs by mouth every four hours as needed for Shortness of Breath. 1 Inhaler 0   • REPATHA SURECLICK 140 MG/ML Solution Auto-injector Inject 1 Dose as instructed every 14 days.     • [employer] VITAMIN E 400 units Cap Take 2 Capsule by mouth every day.     • clonazePAM (KLONOPIN) 0.5 MG Tab Take 1 Tab by mouth 2 times a day as needed.       No current Epic-ordered facility-administered medications on file.        Past Medical History:   Diagnosis Date   • Anxiety    • Arthritis    • COPD (chronic obstructive pulmonary disease) (HCC)    • Depression    • Hyperlipidemia    • Hypertension    • PTSD (post-traumatic stress disorder)    • Stroke (HCC)        Past Surgical History:   Procedure Laterality Date   • ABDOMINAL EXPLORATION     • APPENDECTOMY         Social History     Tobacco Use   • Smoking status: Current Every Day Smoker     Packs/day: 0.50     Years: 38.00     Pack years: 19.00   • Smokeless tobacco: Current User     Types: Chew   • Tobacco comment: down 5/cigarrettes a day or less   Substance Use Topics   • Alcohol use: Yes     Comment: socially - usually 1 beer twice a year   • Drug use: Yes     Frequency: 7.0 times per week     " Types: Marijuana     Comment: nightly to sleep       Social History     Patient does not qualify to have social determinant information on file (likely too young).   Social History Narrative   • Not on file       Family History   Problem Relation Age of Onset   • Hypertension Mother    • Hyperlipidemia Mother    • Hypertension Father    • Lung Disease Father    • Cancer Child         non hodkins lymphoma   • Anxiety disorder Brother        ROS:     - Constitutional: Negative for fever, chills    - Eyes:   Negative for blurry vision, eye pain, discharge    - ENT:  Negative for hearing changes, ear pain, ear discharge, rhinorrhea, sinus congestion, sore throat     - Respiratory: Negative for cough, sputum production, chest congestion, dyspnea, wheezing, and crackles.      - Cardiovascular: Negative for chest pain, palpitations, orthopnea, and bilateral lower extremity edema.     - Gastrointestinal: Negative for abdominal pain, hematochezia, melena,     - Genitourinary: Negative for dysuria    - Musculoskeletal: Negative for myalgias, back pain, and joint pain.     - Skin: Negative for rash, itching, cyanotic skin color change.     - Neurological: Negative for vertigo    - Endo:Negative for polyuria, heat/cold intolerance, excessive thirst    - Hem/lymphatic: Negative for easy bruising, blood clots, lymphedema, swollen glands    -Allergic/immun: Negative for allergic rhinitis    - Psychiatric/Behavioral: Negative for  suicidal/homicidal ideation and memory loss.      Exam: /70 (BP Location: Right arm, Patient Position: Sitting, BP Cuff Size: Adult)   Pulse 71   Temp 37.1 °C (98.8 °F) (Temporal)   Resp 16   Ht 1.753 m (5' 9\")   Wt 96 kg (211 lb 10.3 oz)   SpO2 96%  Body mass index is 31.25 kg/m².    General: Normal appearing. No distress.  EYES: Conjunctiva clear lids without ptosis, pupils equal  EARS: Normal shape and contour   Pulmonary: Clear to ausculation.  Normal effort. No rales or " disintegrating tablet 4 mg  4 mg Oral Q12H PRN    Or    ondansetron (ZOFRAN) injection 4 mg  4 mg Intravenous Q12H PRN    polyethylene glycol (MIRALAX) packet 17 g  17 g Oral Daily PRN    docusate sodium-sennosides (SENOKOT S) 50-8.6 MG 2 tablet  2 tablet Oral BID PRN    bisacodyl (DULCOLAX) suppository 10 mg  10 mg Rectal Daily PRN    magnesium hydroxide (MILK OF MAGNESIA) 400 MG/5ML suspension 30 mL  30 mL Oral Daily PRN    melatonin tablet 3 mg  3 mg Oral Nightly PRN          Exam and diagnostic w/u:   Visit Vitals  BP (!) 173/89   Pulse 84   Temp 97.8 °F (36.6 °C) (Oral)   Resp (!) 12   LMP 12/09/2014   SpO2 97%       Head and neck:atraumatic  Lungs: There is no wheezing or crackles.   Heart: no M.   Abd: Soft, nontender, non distended.    Skin: Warm and dry.  Neuro: Cranial nerves grossly intact intact, no focal deficits.  Speech is normal.    Psych: Pt with flat affect and is very guarded on questioning her activities of daily living. She is requesting pain medications  Musc: Pt keeps her forearms flexed at the elbows. Pt has decreased ROM both wrists and experiences pain with passive ROM. Full ROM both elbows and shoulders.     Labs     Recent Labs   Lab 01/25/24  2225   SODIUM 141   POTASSIUM 3.5   CHLORIDE 105   CO2 29   BUN 35*   CREATININE 0.85   CALCIUM 7.9*   ALBUMIN 2.1*   AST 14   GLUCOSE 103*       Recent Labs   Lab 01/25/24  2225   WBC 2.7*   HGB 8.5*   HCT 28.7*          No results found       Recent Labs   Lab 01/25/24  2219   GLUCOSE BEDSIDE 95         Radiology:   CT HEAD WO CONTRAST   Final Result   1.  No acute intracranial abnormality.   2.  Atrophy and extensive chronic small vessel ischemic change.   3.  Paranasal sinus disease, right worse than left.            Electronically signed by Lena Mosher M.D. on 01 26 24 at 05:49      CT CERVICAL SPINE WO CONTRAST    (Results Pending)   XR CHEST PA OR AP 1 VIEW    (Results Pending)   CT LUMBAR SPINE WO CONTRAST    (Results Pending)   XR  SHOULDER 3 VIEWS BILATERAL    (Results Pending)          Medical Decision Making:   Disposition:  Patient with acute morbidities. Patient will require hospitalization for further management and stabilization.      Discussion and review:  Available medical records reviewed  Results of lab work and imaging studies independently reviewed  Work up initiated as per orders and plan of care  Plan of care discussed with consultants as documented.    Impression:  This is a 54 y/o female with PMH of hypertension, hyperlipidemia, and diabetes mellitus who presents following a fall at home today. Initial workup concerning for UTI; however, patient has also had neurological decline over the past 6 months.    Admission plan of care:  #Altered mental status, resolved: 2/2 UTI vs neurological process ? Depression  #S/p mechanical fall at home  -CT head 1/26: no acute intracranial abnormality, atrophy and extensive chronic small vessel change  -CT cervical spine/lumbar spine 1/26: pending  -CXR 1/26: pending  -bilateral shoulder x-ray 1/26: pending  -consult neurology, appreciate recs  -check TSH, B12, and RPR  -PT/OT eval  -Bilateral Wrist Xray  -Consider Psychiatry Evaluation if flat affect persists  #Acute UTI  -UA 1/25: positive for large blood and large leukocyte  -Urine Cx 1/25: pending  -continue ceftriaxone pending culture    #Normocytic anemia  -check iron studies  -trend CBC    #Leukopenia: 2/2 UTI?  -trend CBC    #Hypertension  #Hyperlipidemia  -continue home amlodipine    #Diabetes mellitus  -diabetic diet  -Accuchecks and SSI    #Social: patient's son requesting assistance with help at home, insurance, and visiting MD;  consult placed    #Additional orders:  -Monitor kidney function  -Monitor electrolytes and replace as indicated  -DVT prophylaxis     Primary Care Physician  Pcp, No    Consults:       Code Status    Code Status: Full Resuscitation    This note was created using voice recognition technology. It may  wheezing.  Cardiovascular: Regular rate and rhythm without significant murmur.   Abdomen: Soft, nontender, nondistended. Normal bowel sounds.  Neurologic: Cranial nerves grossly nonfocal  Skin: Warm and dry.  No obvious lesions.  Musculoskeletal: Normal gait. No extremity cyanosis, clubbing, or edema.  Psych: Normal mood and affect. Alert and oriented x3. Judgment and insight is normal.        Assessment/Plan  1. Essential hypertension  Chronic condition not controlled, continue hydrochlorothiazide with lisinopril noted he is also on prazosin.    2. Dyslipidemia  Stable, chronic condition continue cardiology follow-up as well as current medications which include Repatha.    3. Iron deficiency anemia due to chronic blood loss  Stable, he has had work-up so far with endoscopy, currently pending pill capsule study.    4. Elevated liver enzymes  Overall chronic, stable issue, thought to be due to fatty liver disease, patient indicates he will work on changing his diet to bring down BMI.  We will monitor closely.    5. Tobacco dependence  Patient says he quit 12/31/2019 and he was congratulated.  We will continue to monitor.    6. Prediabetes  Fasting glucose now within normal limits on labs post dietary changes.  We will continue to monitor.      Return to clinic 5 months or sooner if needed        Please note that this dictation was created using voice recognition software. I have made every reasonable attempt to correct obvious errors, but I expect that there are errors of grammar and possibly content that I did not discover before finalizing the note.     include inadvertent transcriptional errors. Any such errors should be contextually interpreted and should not be taken to alter the content or the meaning.   Note to Patient: The 21st Century Cures Act makes medical notes like these available to patients in the interest of transparency. However, be advised this is a medical document. It is intended as peer to peer communication. It is written in medical language and may contain abbreviations or verbiage that are unfamiliar. It may appear blunt or direct. Medical documents are intended to carry relevant information, facts as evident, and the clinical opinion of the practitioner.

## 2024-11-07 NOTE — DISCHARGE SUMMARY
Discharge Summary    CHIEF COMPLAINT ON ADMISSION  No chief complaint on file.      Reason for Admission  Malignant neoplasm of unspecified *     Admission Date  4/6/2022    CODE STATUS  Full Code    HPI & HOSPITAL COURSE  This is a 51 y.o. male here with s/p L VATS pleural biopsy, which they tolerated well.  Chest tube was removed on POD 1.  At time of discharge the patient was eating, voiding, ambulating, and pain was controlled on oral medication.     Exam at time of discharge:  NAD, awake, alert  Breathing nonlabored  L VATS incisions c/d/i      Therefore, he is discharged in good and stable condition to home with close outpatient follow-up.    The patient recovered much more quickly than anticipated on admission.    Discharge Date  4/7/2022    FOLLOW UP ITEMS POST DISCHARGE  Final pathology    DISCHARGE DIAGNOSES  Principal Problem:    Adenocarcinoma, lung, left (HCC) POA: Yes  Resolved Problems:    * No resolved hospital problems. *      FOLLOW UP  Future Appointments   Date Time Provider Department Center   6/27/2022  9:00 AM Luis Miguel Merino M.D. [facility]     No follow-up provider specified.    MEDICATIONS ON DISCHARGE     Medication List      START taking these medications      Instructions   acetaminophen 500 MG Tabs  Commonly known as: TYLENOL   Take 2 Tablets by mouth every 6 hours for 7 days.  Dose: 1,000 mg     ibuprofen 800 MG Tabs  Commonly known as: MOTRIN   Take 1 Tablet by mouth 3 times a day with meals for 7 days.  Dose: 800 mg     oxyCODONE immediate-release 5 MG Tabs  Commonly known as: ROXICODONE   Take 1 Tablet by mouth every 6 hours as needed for up to 3 days.  Dose: 5 mg        CHANGE how you take these medications      Instructions   buPROPion 300 MG XL tablet  What changed: additional instructions  Commonly known as: WELLBUTRIN XL   TAKE 1 TABLET BY MOUTH EVERY DAY IN THE MORNING     lisinopril 40 MG tablet  What changed: when to take this  Commonly known as: PRINIVIL   TAKE 1  TABLET BY MOUTH EVERY DAY     prazosin 2 MG Caps  What changed: See the new instructions.  Commonly known as: MINIPRESS   TAKE 2 CAPS BY MOUTH EVERY BEDTIME     urea 10 % cream  What changed:   · how much to take  · how to take this  · when to take this  · reasons to take this  Commonly known as: CARMOL   Use after showers and daily all over to dry skin patches.        CONTINUE taking these medications      Instructions   albuterol 108 (90 Base) MCG/ACT Aers inhalation aerosol   Inhale 2 Puffs by mouth every four hours as needed for Shortness of Breath.  Dose: 2 Puff     ALPRAZolam 0.5 MG Tabs  Commonly known as: XANAX   Take 0.5 mg by mouth at bedtime as needed for Sleep.  Dose: 0.5 mg     asa/apap/caffeine 250-250-65 MG Tabs  Commonly known as: EXCEDRIN   Take 3 Tablets by mouth one time as needed for Headache.  Dose: 3 Tablet     atorvastatin 80 MG tablet  Commonly known as: LIPITOR   Take 80 mg by mouth every evening.  Dose: 80 mg     B-12 PO   Take 1 Tablet by mouth every day.  Dose: 1 Tablet     busPIRone 30 MG tablet  Commonly known as: BUSPAR   Take 30 mg by mouth 2 times a day.  Dose: 30 mg     D3 PO   Take 2 Tablets by mouth every day.  Dose: 2 Tablet     fenofibrate 145 MG Tabs  Commonly known as: TRICOR   Take 145 mg by mouth every evening.  Dose: 145 mg     fluvoxamine 100 MG tablet  Commonly known as: LUVOX   Take 100 mg by mouth every evening.  Dose: 100 mg     loratadine 10 MG Tabs  Commonly known as: CLARITIN   Take 10 mg by mouth every day.  Dose: 10 mg     multivitamin Tabs   Take 1 tablet by mouth every day.  Dose: 1 Tablet     omeprazole 20 MG delayed-release capsule  Commonly known as: PRILOSEC   Take 20 mg by mouth every evening.  Dose: 20 mg     tamsulosin 0.4 MG capsule  Commonly known as: FLOMAX   Take 0.4 mg by mouth 2 (two) times a day.  Dose: 0.4 mg            Allergies  No Known Allergies    DIET  No orders of the defined types were placed in this encounter.      ACTIVITY  As  tolerated.  Weight bearing as tolerated    CONSULTATIONS  none    PROCEDURES  4/6/2022:  L VATS pleural biopsy    LABORATORY  Lab Results   Component Value Date    SODIUM 137 04/06/2022    POTASSIUM 4.0 04/06/2022    CHLORIDE 104 04/06/2022    CO2 21 04/06/2022    GLUCOSE 148 (H) 04/06/2022    BUN 19 04/06/2022    CREATININE 0.92 04/06/2022        Lab Results   Component Value Date    WBC 10.9 (H) 04/06/2022    HEMOGLOBIN 12.3 (L) 04/06/2022    HEMATOCRIT 37.7 (L) 04/06/2022    PLATELETCT 233 04/06/2022        Total time of the discharge process exceeds 10 minutes.   General Sunscreen Counseling: I recommended a broad spectrum sunscreen with a SPF of 30 or higher.  I explained that SPF 30 sunscreens block approximately 97 percent of the sun's harmful rays.  Sunscreens should be applied at least 15 minutes prior to expected sun exposure and then every 2 hours after that as long as sun exposure continues. If swimming or exercising sunscreen should be reapplied every 45 minutes to an hour after getting wet or sweating.  One ounce, or the equivalent of a shot glass full of sunscreen, is adequate to protect the skin not covered by a bathing suit. I also recommended a lip balm with a sunscreen as well. Sun protective clothing can be used in lieu of sunscreen but must be worn the entire time you are exposed to the sun's rays. Detail Level: Detailed

## (undated) DEVICE — SUTURE 4-0 VICRYL PLUSFS-1 - 27 INCH (36/BX)

## (undated) DEVICE — DRAPE MAGNETIC (INSTRA-MAG) - (30/CA)

## (undated) DEVICE — LACTATED RINGERS INJ 1000 ML - (14EA/CA 60CA/PF)

## (undated) DEVICE — SUTURE 0 SILK CT-1 (36PK/BX)

## (undated) DEVICE — GLOVE BIOGEL INDICATOR SZ 7.5 SURGICAL PF LTX - (50PR/BX 4BX/CA)

## (undated) DEVICE — GLOVE BIOGEL SZ 7.5 SURGICAL PF LTX - (50PR/BX 4BX/CA)

## (undated) DEVICE — DRAPE IOBAN II INCISE 23X17 - (10EA/BX 4BX/CA)

## (undated) DEVICE — PROTECTOR ULNA NERVE - (36PR/CA)

## (undated) DEVICE — GLOVE, LITE (PAIR)

## (undated) DEVICE — TOWEL STOP TIMEOUT SAFETY FLAG (40EA/CA)

## (undated) DEVICE — SPONGE GAUZE NON-STERILE 4X4 - (2000/CA 10PK/CA)

## (undated) DEVICE — SENSOR SPO2 NEO LNCS ADHESIVE (20/BX) SEE USER NOTES

## (undated) DEVICE — KIT  I.V. START (100EA/CA)

## (undated) DEVICE — NEPTUNE 4 PORT MANIFOLD - (20/PK)

## (undated) DEVICE — SPONGE DRAIN 4 X 4IN 6-PLY - (2/PK25PK/BX12BX/CS)

## (undated) DEVICE — GOWN SURGEONS X-LARGE - DISP. (30/CA)

## (undated) DEVICE — DRESSING NON-ADHERING 8 X 3 - (50/BX)

## (undated) DEVICE — BLADE SURGICAL #11 - (50/BX)

## (undated) DEVICE — BOVIE  BLADE 6 EXTENDED - (50/PK)

## (undated) DEVICE — DERMABOND ADVANCED - (12EA/BX)

## (undated) DEVICE — DRAPESURG STERI-DRAPE LONG - (10/BX 4BX/CA)

## (undated) DEVICE — CANNULA O2 COMFORT SOFT EAR ADULT 7 FT TUBING (50/CA)

## (undated) DEVICE — ELECTRODE DUAL RETURN W/ CORD - (50/PK)

## (undated) DEVICE — SYRINGE DISP. 60 CC LL - (30/BX, 12BX/CA)**WHEN THESE ARE GONE ORDER #500206**

## (undated) DEVICE — SET LEADWIRE 5 LEAD BEDSIDE DISPOSABLE ECG (1SET OF 5/EA)

## (undated) DEVICE — SENSOR SPO2 ADULT LNCS ADTX (20/BX) ORDER ITEM #19593

## (undated) DEVICE — STAPLER SKIN DISP - (6/BX 10BX/CA) VISISTAT

## (undated) DEVICE — MASK ANESTHESIA ADULT  - (100/CA)

## (undated) DEVICE — GLOVE BIOGEL SZ 6.5 SURGICAL PF LTX (50PR/BX 4BX/CA)

## (undated) DEVICE — GOWN WARMING STANDARD FLEX - (30/CA)

## (undated) DEVICE — KIT ANESTHESIA W/CIRCUIT & 3/LT BAG W/FILTER (20EA/CA)

## (undated) DEVICE — TROCAR 12MM THORACOPORT (6EA/BX)

## (undated) DEVICE — CHLORAPREP 26 ML APPLICATOR - ORANGE TINT(25/CA)

## (undated) DEVICE — KIT CUSTOM PROCEDURE SINGLE FOR ENDO  (15/CA)

## (undated) DEVICE — DRAPE CHEST/BREAST - (12EA/CA)

## (undated) DEVICE — CANISTER SUCTION RIGID RED 1500CC (40EA/CA)

## (undated) DEVICE — SUCTION INSTRUMENT YANKAUER BULBOUS TIP W/O VENT (50EA/CA)

## (undated) DEVICE — FORCEP RADIAL JAW 4 STANDARD CAPACITY W/NEEDLE 240CM (40EA/BX)

## (undated) DEVICE — Device

## (undated) DEVICE — TUBING CLEARLINK DUO-VENT - C-FLO (48EA/CA)

## (undated) DEVICE — SET EXTENSION WITH 2 PORTS (48EA/CA) ***PART #2C8610 IS A SUBSTITUTE*****

## (undated) DEVICE — SUTURE GENERAL

## (undated) DEVICE — WATER IRRIGATION STERILE 1000ML (12EA/CA)

## (undated) DEVICE — TUBE CONNECTING SUCTION - CLEAR PLASTIC STERILE 72 IN (50EA/CA)

## (undated) DEVICE — SLEEVE, VASO, THIGH, MED

## (undated) DEVICE — ELECTRODE 850 FOAM ADHESIVE - HYDROGEL RADIOTRNSPRNT (50/PK)

## (undated) DEVICE — SYRINGE SAFETY 3 ML 18 GA X 1 1/2 BLUNT LL (100/BX 8BX/CA)

## (undated) DEVICE — SUTURE 2-0 VICRYL PLUS CT-1 36 (36PK/BX)"

## (undated) DEVICE — CATHETER IV SAFETY 20 GA X 1-1/4 (50/BX)

## (undated) DEVICE — VESSEL DIVIDER SEAL LAP LIGASURE 37CM (6EA/BX)

## (undated) DEVICE — TUBE ENDOBRONCHEAL 39FR (1/EA)

## (undated) DEVICE — SUTURE 4-0 VICRYL PLUS FS-2 - 27 INCH (36/BX)

## (undated) DEVICE — SYRINGE SAFETY 5 ML 18 GA X 1-1/2 BLUNT LL (100/BX 4BX/CA)

## (undated) DEVICE — KIT RADIAL ARTERY 20GA W/MAX BARRIER AND BIOPATCH  (5EA/CA) #10740 IS FOR THE SET RADIAL ARTERIAL

## (undated) DEVICE — HEAD HOLDER JUNIOR/ADULT

## (undated) DEVICE — CONNECTOR Y TBG CRTY 5 IN 1 STERILE (50EA/CA)

## (undated) DEVICE — BAG SPONGE COUNT 10.25 X 32 - BLUE (250/CA)

## (undated) DEVICE — TRANSDUCER ADULT DISP. SINGLE BONDED STERILE - (20EA/CA)

## (undated) DEVICE — TROCAR 5X100 NON BLADED Z-TH - READ KII (6/BX)

## (undated) DEVICE — ANTI-FOG SOLUTION - 60BTL/CA

## (undated) DEVICE — SYSTEM CHEST DRAIN ADULT/PEDS W/AUTO TRANSFUSION CAPABILITY SAHARA (6EA/CA)

## (undated) DEVICE — ENDO PEANUT 5MM DEVICE (12EA/BX)

## (undated) DEVICE — CANNULA W/SEAL 5X100 Z-THRE - ADED KII (12/BX)

## (undated) DEVICE — CANISTER SUCTION 3000ML MECHANICAL FILTER AUTO SHUTOFF MEDI-VAC NONSTERILE LF DISP  (40EA/CA)

## (undated) DEVICE — SET SUCTION/IRRIGATION WITH DISPOSABLE TIP (6/CA )PART #0250-070-520 IS A SUB

## (undated) DEVICE — PAD PREP 24 X 48 CUFFED - (100/CA)

## (undated) DEVICE — SODIUM CHL. INJ. 0.9% 500ML (24EA/CA 50CA/PF)

## (undated) DEVICE — SYRINGE SAFETY 10 ML 18 GA X 1 1/2 BLUNT LL (100/BX 4BX/CA)

## (undated) DEVICE — GOWN SURGEONS LARGE - (32/CA)

## (undated) DEVICE — SODIUM CHL IRRIGATION 0.9% 1000ML (12EA/CA)